# Patient Record
Sex: FEMALE | Race: WHITE | Employment: FULL TIME | URBAN - METROPOLITAN AREA
[De-identification: names, ages, dates, MRNs, and addresses within clinical notes are randomized per-mention and may not be internally consistent; named-entity substitution may affect disease eponyms.]

---

## 2018-05-07 ENCOUNTER — TELEPHONE (OUTPATIENT)
Dept: BREAST CENTER | Age: 38
End: 2018-05-07

## 2018-05-07 DIAGNOSIS — N63.10 MASS OF BREAST, RIGHT: Primary | ICD-10-CM

## 2018-05-09 ENCOUNTER — HOSPITAL ENCOUNTER (OUTPATIENT)
Dept: GENERAL RADIOLOGY | Age: 38
Discharge: HOME | End: 2018-05-11
Payer: COMMERCIAL

## 2018-05-09 DIAGNOSIS — N63.10 MASS OF RIGHT BREAST: ICD-10-CM

## 2018-05-09 DIAGNOSIS — N63.10 MASS OF BREAST, RIGHT: ICD-10-CM

## 2018-05-09 PROCEDURE — 88342 IMHCHEM/IMCYTCHM 1ST ANTB: CPT

## 2018-05-09 PROCEDURE — A4648 IMPLANTABLE TISSUE MARKER: HCPCS

## 2018-05-09 PROCEDURE — 2500000003 HC RX 250 WO HCPCS

## 2018-05-09 PROCEDURE — 77065 DX MAMMO INCL CAD UNI: CPT

## 2018-05-09 PROCEDURE — 88305 TISSUE EXAM BY PATHOLOGIST: CPT

## 2018-05-09 PROCEDURE — 88360 TUMOR IMMUNOHISTOCHEM/MANUAL: CPT

## 2018-05-09 PROCEDURE — 88341 IMHCHEM/IMCYTCHM EA ADD ANTB: CPT

## 2018-05-10 ENCOUNTER — TELEPHONE (OUTPATIENT)
Dept: ONCOLOGY | Age: 38
End: 2018-05-10

## 2018-05-15 ENCOUNTER — TELEPHONE (OUTPATIENT)
Dept: GENERAL RADIOLOGY | Age: 38
End: 2018-05-15

## 2018-05-16 DIAGNOSIS — Z85.3 HISTORY OF RIGHT BREAST CANCER: Primary | ICD-10-CM

## 2018-05-17 ENCOUNTER — OFFICE VISIT (OUTPATIENT)
Dept: BREAST CENTER | Age: 38
End: 2018-05-17
Payer: COMMERCIAL

## 2018-05-17 ENCOUNTER — TELEPHONE (OUTPATIENT)
Dept: SURGERY | Age: 38
End: 2018-05-17

## 2018-05-17 ENCOUNTER — HOSPITAL ENCOUNTER (OUTPATIENT)
Dept: GENERAL RADIOLOGY | Age: 38
Discharge: HOME | End: 2018-05-19
Payer: COMMERCIAL

## 2018-05-17 VITALS
SYSTOLIC BLOOD PRESSURE: 120 MMHG | RESPIRATION RATE: 16 BRPM | DIASTOLIC BLOOD PRESSURE: 68 MMHG | TEMPERATURE: 98.1 F | HEIGHT: 62 IN | HEART RATE: 76 BPM | WEIGHT: 157 LBS | OXYGEN SATURATION: 98 % | BODY MASS INDEX: 28.89 KG/M2

## 2018-05-17 DIAGNOSIS — C50.419 MALIGNANT NEOPLASM OF UPPER-OUTER QUADRANT OF BREAST IN FEMALE, ESTROGEN RECEPTOR POSITIVE, UNSPECIFIED LATERALITY (HCC): Primary | ICD-10-CM

## 2018-05-17 DIAGNOSIS — C50.411 MALIGNANT NEOPLASM OF UPPER-OUTER QUADRANT OF RIGHT BREAST IN FEMALE, ESTROGEN RECEPTOR POSITIVE (HCC): ICD-10-CM

## 2018-05-17 DIAGNOSIS — Z17.0 MALIGNANT NEOPLASM OF UPPER-OUTER QUADRANT OF RIGHT BREAST IN FEMALE, ESTROGEN RECEPTOR POSITIVE (HCC): Primary | ICD-10-CM

## 2018-05-17 DIAGNOSIS — Z17.0 MALIGNANT NEOPLASM OF UPPER-OUTER QUADRANT OF BREAST IN FEMALE, ESTROGEN RECEPTOR POSITIVE, UNSPECIFIED LATERALITY (HCC): Primary | ICD-10-CM

## 2018-05-17 DIAGNOSIS — C50.411 MALIGNANT NEOPLASM OF UPPER-OUTER QUADRANT OF RIGHT BREAST IN FEMALE, ESTROGEN RECEPTOR POSITIVE (HCC): Primary | ICD-10-CM

## 2018-05-17 DIAGNOSIS — Z17.0 MALIGNANT NEOPLASM OF UPPER-OUTER QUADRANT OF RIGHT BREAST IN FEMALE, ESTROGEN RECEPTOR POSITIVE (HCC): ICD-10-CM

## 2018-05-17 DIAGNOSIS — Z85.3 HISTORY OF RIGHT BREAST CANCER: ICD-10-CM

## 2018-05-17 LAB
ALBUMIN SERPL-MCNC: 4.2 G/DL (ref 3.5–5.2)
ALP BLD-CCNC: 146 U/L (ref 35–104)
ALT SERPL-CCNC: 7 U/L (ref 0–32)
ANION GAP SERPL CALCULATED.3IONS-SCNC: 11 MMOL/L (ref 7–16)
AST SERPL-CCNC: 12 U/L (ref 0–31)
BILIRUB SERPL-MCNC: 0.5 MG/DL (ref 0–1.2)
BUN BLDV-MCNC: 5 MG/DL (ref 6–20)
C-REACTIVE PROTEIN: 0.3 MG/DL (ref 0–0.4)
CALCIUM SERPL-MCNC: 8.8 MG/DL (ref 8.6–10.2)
CHLORIDE BLD-SCNC: 104 MMOL/L (ref 98–107)
CO2: 24 MMOL/L (ref 22–29)
CREAT SERPL-MCNC: 0.7 MG/DL (ref 0.5–1)
GFR AFRICAN AMERICAN: >60
GFR NON-AFRICAN AMERICAN: >60 ML/MIN/1.73
GLUCOSE BLD-MCNC: 87 MG/DL (ref 74–109)
HCT VFR BLD CALC: 39.5 % (ref 34–48)
HEMOGLOBIN: 13 G/DL (ref 11.5–15.5)
MCH RBC QN AUTO: 30.2 PG (ref 26–35)
MCHC RBC AUTO-ENTMCNC: 32.9 % (ref 32–34.5)
MCV RBC AUTO: 91.9 FL (ref 80–99.9)
PDW BLD-RTO: 13.2 FL (ref 11.5–15)
PLATELET # BLD: 438 E9/L (ref 130–450)
PMV BLD AUTO: 9.1 FL (ref 7–12)
POTASSIUM SERPL-SCNC: 3.6 MMOL/L (ref 3.5–5)
RBC # BLD: 4.3 E12/L (ref 3.5–5.5)
SEDIMENTATION RATE, ERYTHROCYTE: 19 MM/HR (ref 0–20)
SODIUM BLD-SCNC: 139 MMOL/L (ref 132–146)
TOTAL PROTEIN: 7 G/DL (ref 6.4–8.3)
WBC # BLD: 10 E9/L (ref 4.5–11.5)

## 2018-05-17 PROCEDURE — 99203 OFFICE O/P NEW LOW 30 MIN: CPT | Performed by: SURGERY

## 2018-05-17 PROCEDURE — 36415 COLL VENOUS BLD VENIPUNCTURE: CPT

## 2018-05-17 PROCEDURE — 85651 RBC SED RATE NONAUTOMATED: CPT

## 2018-05-17 PROCEDURE — 85027 COMPLETE CBC AUTOMATED: CPT

## 2018-05-17 PROCEDURE — 86140 C-REACTIVE PROTEIN: CPT

## 2018-05-17 PROCEDURE — 76882 US LMTD JT/FCL EVL NVASC XTR: CPT

## 2018-05-17 PROCEDURE — 99205 OFFICE O/P NEW HI 60 MIN: CPT | Performed by: SURGERY

## 2018-05-17 PROCEDURE — 71046 X-RAY EXAM CHEST 2 VIEWS: CPT

## 2018-05-17 PROCEDURE — 80053 COMPREHEN METABOLIC PANEL: CPT

## 2018-05-17 ASSESSMENT — ENCOUNTER SYMPTOMS
COUGH: 1
GASTROINTESTINAL NEGATIVE: 1
WHEEZING: 1
EYES NEGATIVE: 1

## 2018-05-21 ENCOUNTER — HOSPITAL ENCOUNTER (OUTPATIENT)
Dept: NUCLEAR MEDICINE | Age: 38
Discharge: HOME | End: 2018-05-23
Payer: COMMERCIAL

## 2018-05-21 DIAGNOSIS — Z17.0 MALIGNANT NEOPLASM OF UPPER-OUTER QUADRANT OF BREAST IN FEMALE, ESTROGEN RECEPTOR POSITIVE, UNSPECIFIED LATERALITY (HCC): ICD-10-CM

## 2018-05-21 DIAGNOSIS — C50.419 MALIGNANT NEOPLASM OF UPPER-OUTER QUADRANT OF BREAST IN FEMALE, ESTROGEN RECEPTOR POSITIVE, UNSPECIFIED LATERALITY (HCC): ICD-10-CM

## 2018-05-21 PROCEDURE — 78306 BONE IMAGING WHOLE BODY: CPT

## 2018-05-21 PROCEDURE — A9503 TC99M MEDRONATE: HCPCS | Performed by: RADIOLOGY

## 2018-05-21 PROCEDURE — 3430000000 HC RX DIAGNOSTIC RADIOPHARMACEUTICAL: Performed by: RADIOLOGY

## 2018-05-21 RX ORDER — TC 99M MEDRONATE 20 MG/10ML
25 INJECTION, POWDER, LYOPHILIZED, FOR SOLUTION INTRAVENOUS
Status: COMPLETED | OUTPATIENT
Start: 2018-05-21 | End: 2018-05-21

## 2018-05-21 RX ADMIN — Medication 25 MILLICURIE: at 09:52

## 2018-05-22 ENCOUNTER — TELEPHONE (OUTPATIENT)
Dept: GENERAL RADIOLOGY | Age: 38
End: 2018-05-22

## 2018-05-23 ENCOUNTER — TELEPHONE (OUTPATIENT)
Dept: GENERAL RADIOLOGY | Age: 38
End: 2018-05-23

## 2018-05-23 DIAGNOSIS — C50.411 MALIGNANT NEOPLASM OF UPPER-OUTER QUADRANT OF RIGHT BREAST IN FEMALE, ESTROGEN RECEPTOR POSITIVE (HCC): Primary | ICD-10-CM

## 2018-05-23 DIAGNOSIS — Z17.0 MALIGNANT NEOPLASM OF UPPER-OUTER QUADRANT OF RIGHT BREAST IN FEMALE, ESTROGEN RECEPTOR POSITIVE (HCC): Primary | ICD-10-CM

## 2018-05-30 ENCOUNTER — HOSPITAL ENCOUNTER (OUTPATIENT)
Dept: MRI IMAGING | Age: 38
Discharge: HOME | End: 2018-06-01
Payer: COMMERCIAL

## 2018-05-30 DIAGNOSIS — Z17.0 MALIGNANT NEOPLASM OF UPPER-OUTER QUADRANT OF RIGHT BREAST IN FEMALE, ESTROGEN RECEPTOR POSITIVE (HCC): ICD-10-CM

## 2018-05-30 DIAGNOSIS — C50.411 MALIGNANT NEOPLASM OF UPPER-OUTER QUADRANT OF RIGHT BREAST IN FEMALE, ESTROGEN RECEPTOR POSITIVE (HCC): ICD-10-CM

## 2018-05-30 PROCEDURE — A9577 INJ MULTIHANCE: HCPCS | Performed by: RADIOLOGY

## 2018-05-30 PROCEDURE — 6360000004 HC RX CONTRAST MEDICATION: Performed by: RADIOLOGY

## 2018-05-30 PROCEDURE — C8908 MRI W/O FOL W/CONT, BREAST,: HCPCS

## 2018-05-30 RX ADMIN — GADOBENATE DIMEGLUMINE 20 ML: 529 INJECTION, SOLUTION INTRAVENOUS at 09:07

## 2018-05-31 ENCOUNTER — TELEPHONE (OUTPATIENT)
Dept: SURGERY | Age: 38
End: 2018-05-31

## 2018-06-01 ENCOUNTER — TELEPHONE (OUTPATIENT)
Dept: BREAST CENTER | Age: 38
End: 2018-06-01

## 2018-06-01 ENCOUNTER — OFFICE VISIT (OUTPATIENT)
Dept: SURGERY | Age: 38
End: 2018-06-01
Payer: COMMERCIAL

## 2018-06-01 VITALS
WEIGHT: 155 LBS | HEIGHT: 62 IN | BODY MASS INDEX: 28.52 KG/M2 | SYSTOLIC BLOOD PRESSURE: 114 MMHG | OXYGEN SATURATION: 99 % | HEART RATE: 74 BPM | DIASTOLIC BLOOD PRESSURE: 77 MMHG

## 2018-06-01 DIAGNOSIS — C50.411 MALIGNANT NEOPLASM OF UPPER-OUTER QUADRANT OF RIGHT BREAST IN FEMALE, ESTROGEN RECEPTOR POSITIVE (HCC): Primary | ICD-10-CM

## 2018-06-01 DIAGNOSIS — Z17.0 MALIGNANT NEOPLASM OF UPPER-OUTER QUADRANT OF RIGHT BREAST IN FEMALE, ESTROGEN RECEPTOR POSITIVE (HCC): Primary | ICD-10-CM

## 2018-06-01 DIAGNOSIS — F17.200 SMOKING ADDICTION: ICD-10-CM

## 2018-06-01 PROCEDURE — 99204 OFFICE O/P NEW MOD 45 MIN: CPT | Performed by: PLASTIC SURGERY

## 2018-06-04 ENCOUNTER — HOSPITAL ENCOUNTER (OUTPATIENT)
Dept: RADIATION ONCOLOGY | Age: 38
Discharge: HOME | End: 2018-06-04
Payer: COMMERCIAL

## 2018-06-04 VITALS
TEMPERATURE: 98 F | OXYGEN SATURATION: 95 % | SYSTOLIC BLOOD PRESSURE: 108 MMHG | BODY MASS INDEX: 28.7 KG/M2 | DIASTOLIC BLOOD PRESSURE: 60 MMHG | HEART RATE: 92 BPM | WEIGHT: 156.9 LBS

## 2018-06-04 DIAGNOSIS — C80.1 CANCER (HCC): Primary | ICD-10-CM

## 2018-06-04 PROCEDURE — 99205 OFFICE O/P NEW HI 60 MIN: CPT | Performed by: RADIOLOGY

## 2018-06-04 PROCEDURE — 99205 OFFICE O/P NEW HI 60 MIN: CPT

## 2018-06-04 RX ORDER — VARENICLINE TARTRATE 1 MG/1
1 TABLET, FILM COATED ORAL 2 TIMES DAILY
COMMUNITY
End: 2018-07-11

## 2018-06-15 ENCOUNTER — PREP FOR PROCEDURE (OUTPATIENT)
Dept: SURGERY | Age: 38
End: 2018-06-15

## 2018-06-15 RX ORDER — SODIUM CHLORIDE 9 MG/ML
INJECTION, SOLUTION INTRAVENOUS CONTINUOUS
Status: CANCELLED | OUTPATIENT
Start: 2018-06-15

## 2018-06-15 RX ORDER — SODIUM CHLORIDE 0.9 % (FLUSH) 0.9 %
10 SYRINGE (ML) INJECTION PRN
Status: CANCELLED | OUTPATIENT
Start: 2018-06-15

## 2018-06-15 RX ORDER — SODIUM CHLORIDE 0.9 % (FLUSH) 0.9 %
10 SYRINGE (ML) INJECTION EVERY 12 HOURS SCHEDULED
Status: CANCELLED | OUTPATIENT
Start: 2018-06-15

## 2018-06-19 ENCOUNTER — TELEPHONE (OUTPATIENT)
Dept: BREAST CENTER | Age: 38
End: 2018-06-19

## 2018-06-26 ENCOUNTER — TELEPHONE (OUTPATIENT)
Dept: BREAST CENTER | Age: 38
End: 2018-06-26

## 2018-06-28 ENCOUNTER — TELEPHONE (OUTPATIENT)
Dept: SURGERY | Age: 38
End: 2018-06-28

## 2018-07-03 ENCOUNTER — HOSPITAL ENCOUNTER (OUTPATIENT)
Dept: GENERAL RADIOLOGY | Age: 38
Discharge: HOME | End: 2018-07-05
Attending: SURGERY
Payer: COMMERCIAL

## 2018-07-03 ENCOUNTER — ANESTHESIA (OUTPATIENT)
Dept: OPERATING ROOM | Age: 38
End: 2018-07-03
Payer: COMMERCIAL

## 2018-07-03 ENCOUNTER — HOSPITAL ENCOUNTER (OUTPATIENT)
Dept: NUCLEAR MEDICINE | Age: 38
Discharge: HOME | End: 2018-07-05
Payer: COMMERCIAL

## 2018-07-03 ENCOUNTER — ANESTHESIA EVENT (OUTPATIENT)
Dept: OPERATING ROOM | Age: 38
End: 2018-07-03
Payer: COMMERCIAL

## 2018-07-03 ENCOUNTER — HOSPITAL ENCOUNTER (OUTPATIENT)
Age: 38
Discharge: HOME | End: 2018-07-03
Attending: SURGERY | Admitting: SURGERY
Payer: COMMERCIAL

## 2018-07-03 VITALS
OXYGEN SATURATION: 98 % | SYSTOLIC BLOOD PRESSURE: 115 MMHG | DIASTOLIC BLOOD PRESSURE: 66 MMHG | TEMPERATURE: 96.4 F | RESPIRATION RATE: 16 BRPM

## 2018-07-03 VITALS
RESPIRATION RATE: 16 BRPM | WEIGHT: 156 LBS | TEMPERATURE: 98.2 F | HEIGHT: 62 IN | BODY MASS INDEX: 28.71 KG/M2 | SYSTOLIC BLOOD PRESSURE: 119 MMHG | OXYGEN SATURATION: 95 % | HEART RATE: 77 BPM | DIASTOLIC BLOOD PRESSURE: 64 MMHG

## 2018-07-03 DIAGNOSIS — G89.18 POST-OP PAIN: Primary | ICD-10-CM

## 2018-07-03 DIAGNOSIS — Z85.3 PERSONAL HISTORY OF BREAST CANCER: ICD-10-CM

## 2018-07-03 LAB — PREGNANCY TEST URINE, POC: NEGATIVE

## 2018-07-03 PROCEDURE — 6360000002 HC RX W HCPCS: Performed by: NURSE ANESTHETIST, CERTIFIED REGISTERED

## 2018-07-03 PROCEDURE — 3600000003 HC SURGERY LEVEL 3 BASE: Performed by: SURGERY

## 2018-07-03 PROCEDURE — 3700000000 HC ANESTHESIA ATTENDED CARE: Performed by: SURGERY

## 2018-07-03 PROCEDURE — A9541 TC99M SULFUR COLLOID: HCPCS | Performed by: RADIOLOGY

## 2018-07-03 PROCEDURE — 38792 RA TRACER ID OF SENTINL NODE: CPT

## 2018-07-03 PROCEDURE — 3430000000 HC RX DIAGNOSTIC RADIOPHARMACEUTICAL: Performed by: RADIOLOGY

## 2018-07-03 PROCEDURE — 2709999900 HC NON-CHARGEABLE SUPPLY: Performed by: SURGERY

## 2018-07-03 PROCEDURE — 2500000003 HC RX 250 WO HCPCS: Performed by: NURSE ANESTHETIST, CERTIFIED REGISTERED

## 2018-07-03 PROCEDURE — 7100000010 HC PHASE II RECOVERY - FIRST 15 MIN: Performed by: SURGERY

## 2018-07-03 PROCEDURE — 6360000002 HC RX W HCPCS: Performed by: SURGERY

## 2018-07-03 PROCEDURE — 19318 BREAST REDUCTION: CPT | Performed by: PLASTIC SURGERY

## 2018-07-03 PROCEDURE — 19281 PERQ DEVICE BREAST 1ST IMAG: CPT

## 2018-07-03 PROCEDURE — 2720000010 HC SURG SUPPLY STERILE: Performed by: SURGERY

## 2018-07-03 PROCEDURE — 7100000011 HC PHASE II RECOVERY - ADDTL 15 MIN: Performed by: SURGERY

## 2018-07-03 PROCEDURE — 7100000001 HC PACU RECOVERY - ADDTL 15 MIN: Performed by: SURGERY

## 2018-07-03 PROCEDURE — 2500000003 HC RX 250 WO HCPCS: Performed by: SURGERY

## 2018-07-03 PROCEDURE — 6360000002 HC RX W HCPCS

## 2018-07-03 PROCEDURE — 3600000013 HC SURGERY LEVEL 3 ADDTL 15MIN: Performed by: SURGERY

## 2018-07-03 PROCEDURE — 2580000003 HC RX 258: Performed by: SURGERY

## 2018-07-03 PROCEDURE — 7100000000 HC PACU RECOVERY - FIRST 15 MIN: Performed by: SURGERY

## 2018-07-03 PROCEDURE — 2500000003 HC RX 250 WO HCPCS

## 2018-07-03 PROCEDURE — A4648 IMPLANTABLE TISSUE MARKER: HCPCS | Performed by: SURGERY

## 2018-07-03 PROCEDURE — 76098 X-RAY EXAM SURGICAL SPECIMEN: CPT

## 2018-07-03 PROCEDURE — 3700000001 HC ADD 15 MINUTES (ANESTHESIA): Performed by: SURGERY

## 2018-07-03 PROCEDURE — 6360000002 HC RX W HCPCS: Performed by: ANESTHESIOLOGY

## 2018-07-03 PROCEDURE — 19318 BREAST REDUCTION: CPT | Performed by: PHYSICIAN ASSISTANT

## 2018-07-03 RX ORDER — LIDOCAINE HYDROCHLORIDE 20 MG/ML
INJECTION, SOLUTION INFILTRATION; PERINEURAL PRN
Status: DISCONTINUED | OUTPATIENT
Start: 2018-07-03 | End: 2018-07-03 | Stop reason: SDUPTHER

## 2018-07-03 RX ORDER — HYDROCODONE BITARTRATE AND ACETAMINOPHEN 5; 325 MG/1; MG/1
2 TABLET ORAL PRN
Status: DISCONTINUED | OUTPATIENT
Start: 2018-07-03 | End: 2018-07-03 | Stop reason: HOSPADM

## 2018-07-03 RX ORDER — HYDROCODONE BITARTRATE AND ACETAMINOPHEN 5; 325 MG/1; MG/1
1 TABLET ORAL PRN
Status: DISCONTINUED | OUTPATIENT
Start: 2018-07-03 | End: 2018-07-03 | Stop reason: HOSPADM

## 2018-07-03 RX ORDER — KETOROLAC TROMETHAMINE 30 MG/ML
INJECTION, SOLUTION INTRAMUSCULAR; INTRAVENOUS
Status: COMPLETED
Start: 2018-07-03 | End: 2018-07-03

## 2018-07-03 RX ORDER — SODIUM CHLORIDE 9 MG/ML
INJECTION, SOLUTION INTRAVENOUS CONTINUOUS
Status: DISCONTINUED | OUTPATIENT
Start: 2018-07-03 | End: 2018-07-03 | Stop reason: HOSPADM

## 2018-07-03 RX ORDER — ONDANSETRON 2 MG/ML
INJECTION INTRAMUSCULAR; INTRAVENOUS PRN
Status: DISCONTINUED | OUTPATIENT
Start: 2018-07-03 | End: 2018-07-03 | Stop reason: SDUPTHER

## 2018-07-03 RX ORDER — MIDAZOLAM HYDROCHLORIDE 1 MG/ML
INJECTION INTRAMUSCULAR; INTRAVENOUS PRN
Status: DISCONTINUED | OUTPATIENT
Start: 2018-07-03 | End: 2018-07-03 | Stop reason: SDUPTHER

## 2018-07-03 RX ORDER — SODIUM CHLORIDE 0.9 % (FLUSH) 0.9 %
10 SYRINGE (ML) INJECTION EVERY 12 HOURS SCHEDULED
Status: DISCONTINUED | OUTPATIENT
Start: 2018-07-03 | End: 2018-07-03 | Stop reason: HOSPADM

## 2018-07-03 RX ORDER — MEPERIDINE HYDROCHLORIDE 50 MG/ML
12.5 INJECTION INTRAMUSCULAR; INTRAVENOUS; SUBCUTANEOUS EVERY 5 MIN PRN
Status: DISCONTINUED | OUTPATIENT
Start: 2018-07-03 | End: 2018-07-03 | Stop reason: HOSPADM

## 2018-07-03 RX ORDER — BUPIVACAINE HYDROCHLORIDE 2.5 MG/ML
INJECTION, SOLUTION EPIDURAL; INFILTRATION; INTRACAUDAL PRN
Status: DISCONTINUED | OUTPATIENT
Start: 2018-07-03 | End: 2018-07-03 | Stop reason: HOSPADM

## 2018-07-03 RX ORDER — SODIUM CHLORIDE 0.9 % (FLUSH) 0.9 %
10 SYRINGE (ML) INJECTION PRN
Status: DISCONTINUED | OUTPATIENT
Start: 2018-07-03 | End: 2018-07-03 | Stop reason: SDUPTHER

## 2018-07-03 RX ORDER — PROMETHAZINE HYDROCHLORIDE 25 MG/ML
6.25 INJECTION, SOLUTION INTRAMUSCULAR; INTRAVENOUS EVERY 10 MIN PRN
Status: DISCONTINUED | OUTPATIENT
Start: 2018-07-03 | End: 2018-07-03 | Stop reason: HOSPADM

## 2018-07-03 RX ORDER — MORPHINE SULFATE 10 MG/ML
INJECTION, SOLUTION INTRAMUSCULAR; INTRAVENOUS PRN
Status: DISCONTINUED | OUTPATIENT
Start: 2018-07-03 | End: 2018-07-03 | Stop reason: SDUPTHER

## 2018-07-03 RX ORDER — MORPHINE SULFATE 2 MG/ML
2 INJECTION, SOLUTION INTRAMUSCULAR; INTRAVENOUS EVERY 5 MIN PRN
Status: DISCONTINUED | OUTPATIENT
Start: 2018-07-03 | End: 2018-07-03 | Stop reason: HOSPADM

## 2018-07-03 RX ORDER — METHYLENE BLUE 10 MG/ML
INJECTION INTRAVENOUS PRN
Status: DISCONTINUED | OUTPATIENT
Start: 2018-07-03 | End: 2018-07-03 | Stop reason: HOSPADM

## 2018-07-03 RX ORDER — NEOSTIGMINE METHYLSULFATE 1 MG/ML
INJECTION, SOLUTION INTRAVENOUS PRN
Status: DISCONTINUED | OUTPATIENT
Start: 2018-07-03 | End: 2018-07-03 | Stop reason: SDUPTHER

## 2018-07-03 RX ORDER — PROPOFOL 10 MG/ML
INJECTION, EMULSION INTRAVENOUS PRN
Status: DISCONTINUED | OUTPATIENT
Start: 2018-07-03 | End: 2018-07-03 | Stop reason: SDUPTHER

## 2018-07-03 RX ORDER — GLYCOPYRROLATE 1 MG/5 ML
SYRINGE (ML) INTRAVENOUS PRN
Status: DISCONTINUED | OUTPATIENT
Start: 2018-07-03 | End: 2018-07-03 | Stop reason: SDUPTHER

## 2018-07-03 RX ORDER — ROCURONIUM BROMIDE 10 MG/ML
INJECTION, SOLUTION INTRAVENOUS PRN
Status: DISCONTINUED | OUTPATIENT
Start: 2018-07-03 | End: 2018-07-03 | Stop reason: SDUPTHER

## 2018-07-03 RX ORDER — OXYCODONE HYDROCHLORIDE AND ACETAMINOPHEN 5; 325 MG/1; MG/1
1 TABLET ORAL EVERY 6 HOURS PRN
Qty: 25 TABLET | Refills: 0 | Status: SHIPPED | OUTPATIENT
Start: 2018-07-03 | End: 2018-07-10

## 2018-07-03 RX ORDER — DEXAMETHASONE SODIUM PHOSPHATE 10 MG/ML
INJECTION, SOLUTION INTRAMUSCULAR; INTRAVENOUS PRN
Status: DISCONTINUED | OUTPATIENT
Start: 2018-07-03 | End: 2018-07-03 | Stop reason: SDUPTHER

## 2018-07-03 RX ORDER — SODIUM CHLORIDE 0.9 % (FLUSH) 0.9 %
10 SYRINGE (ML) INJECTION PRN
Status: DISCONTINUED | OUTPATIENT
Start: 2018-07-03 | End: 2018-07-03 | Stop reason: HOSPADM

## 2018-07-03 RX ORDER — SODIUM CHLORIDE 0.9 % (FLUSH) 0.9 %
10 SYRINGE (ML) INJECTION EVERY 12 HOURS SCHEDULED
Status: DISCONTINUED | OUTPATIENT
Start: 2018-07-03 | End: 2018-07-03 | Stop reason: SDUPTHER

## 2018-07-03 RX ORDER — KETOROLAC TROMETHAMINE 30 MG/ML
30 INJECTION, SOLUTION INTRAMUSCULAR; INTRAVENOUS ONCE
Status: COMPLETED | OUTPATIENT
Start: 2018-07-03 | End: 2018-07-03

## 2018-07-03 RX ORDER — CEPHALEXIN 500 MG/1
500 CAPSULE ORAL 4 TIMES DAILY
Qty: 20 CAPSULE | Refills: 0 | Status: SHIPPED | OUTPATIENT
Start: 2018-07-03 | End: 2018-07-08

## 2018-07-03 RX ORDER — MORPHINE SULFATE 2 MG/ML
1 INJECTION, SOLUTION INTRAMUSCULAR; INTRAVENOUS EVERY 5 MIN PRN
Status: DISCONTINUED | OUTPATIENT
Start: 2018-07-03 | End: 2018-07-03 | Stop reason: HOSPADM

## 2018-07-03 RX ORDER — FENTANYL CITRATE 50 UG/ML
INJECTION, SOLUTION INTRAMUSCULAR; INTRAVENOUS PRN
Status: DISCONTINUED | OUTPATIENT
Start: 2018-07-03 | End: 2018-07-03 | Stop reason: SDUPTHER

## 2018-07-03 RX ORDER — MIDAZOLAM HYDROCHLORIDE 5 MG/ML
INJECTION INTRAMUSCULAR; INTRAVENOUS PRN
Status: DISCONTINUED | OUTPATIENT
Start: 2018-07-03 | End: 2018-07-03

## 2018-07-03 RX ADMIN — CEFAZOLIN SODIUM 2 G: 2 SOLUTION INTRAVENOUS at 10:04

## 2018-07-03 RX ADMIN — Medication 0.6 MG: at 12:29

## 2018-07-03 RX ADMIN — FENTANYL CITRATE 25 MCG: 50 INJECTION, SOLUTION INTRAMUSCULAR; INTRAVENOUS at 12:45

## 2018-07-03 RX ADMIN — ONDANSETRON 4 MG: 2 INJECTION INTRAMUSCULAR; INTRAVENOUS at 12:19

## 2018-07-03 RX ADMIN — LIDOCAINE HYDROCHLORIDE 80 MG: 20 INJECTION, SOLUTION INFILTRATION; PERINEURAL at 10:01

## 2018-07-03 RX ADMIN — Medication 500 MICRO CURIE: at 08:43

## 2018-07-03 RX ADMIN — SODIUM CHLORIDE: 9 INJECTION, SOLUTION INTRAVENOUS at 10:52

## 2018-07-03 RX ADMIN — Medication 3 MG: at 12:29

## 2018-07-03 RX ADMIN — PROPOFOL 180 MG: 10 INJECTION, EMULSION INTRAVENOUS at 10:01

## 2018-07-03 RX ADMIN — FENTANYL CITRATE 50 MCG: 50 INJECTION, SOLUTION INTRAMUSCULAR; INTRAVENOUS at 12:05

## 2018-07-03 RX ADMIN — SODIUM CHLORIDE: 9 INJECTION, SOLUTION INTRAVENOUS at 09:56

## 2018-07-03 RX ADMIN — MORPHINE SULFATE 2 MG: 10 INJECTION INTRAVENOUS at 12:44

## 2018-07-03 RX ADMIN — MORPHINE SULFATE 2.5 MG: 10 INJECTION INTRAVENOUS at 12:54

## 2018-07-03 RX ADMIN — FENTANYL CITRATE 50 MCG: 50 INJECTION, SOLUTION INTRAMUSCULAR; INTRAVENOUS at 10:52

## 2018-07-03 RX ADMIN — HYDROMORPHONE HYDROCHLORIDE 0.5 MG: 1 INJECTION, SOLUTION INTRAMUSCULAR; INTRAVENOUS; SUBCUTANEOUS at 13:35

## 2018-07-03 RX ADMIN — MORPHINE SULFATE 3 MG: 10 INJECTION INTRAVENOUS at 12:50

## 2018-07-03 RX ADMIN — MIDAZOLAM HYDROCHLORIDE 2 MG: 1 INJECTION, SOLUTION INTRAMUSCULAR; INTRAVENOUS at 09:56

## 2018-07-03 RX ADMIN — KETOROLAC TROMETHAMINE 30 MG: 30 INJECTION, SOLUTION INTRAMUSCULAR; INTRAVENOUS at 13:24

## 2018-07-03 RX ADMIN — ENOXAPARIN SODIUM 40 MG: 40 INJECTION SUBCUTANEOUS at 09:06

## 2018-07-03 RX ADMIN — ROCURONIUM BROMIDE 40 MG: 10 INJECTION, SOLUTION INTRAVENOUS at 10:01

## 2018-07-03 RX ADMIN — FENTANYL CITRATE 100 MCG: 50 INJECTION, SOLUTION INTRAMUSCULAR; INTRAVENOUS at 10:01

## 2018-07-03 RX ADMIN — DEXAMETHASONE SODIUM PHOSPHATE 10 MG: 10 INJECTION, SOLUTION INTRAMUSCULAR; INTRAVENOUS at 10:01

## 2018-07-03 RX ADMIN — MORPHINE SULFATE 2.5 MG: 10 INJECTION INTRAVENOUS at 12:55

## 2018-07-03 RX ADMIN — FENTANYL CITRATE 25 MCG: 50 INJECTION, SOLUTION INTRAMUSCULAR; INTRAVENOUS at 12:37

## 2018-07-03 ASSESSMENT — PULMONARY FUNCTION TESTS
PIF_VALUE: 22
PIF_VALUE: 27
PIF_VALUE: 23
PIF_VALUE: 5
PIF_VALUE: 20
PIF_VALUE: 23
PIF_VALUE: 22
PIF_VALUE: 13
PIF_VALUE: 21
PIF_VALUE: 21
PIF_VALUE: 23
PIF_VALUE: 0
PIF_VALUE: 21
PIF_VALUE: 23
PIF_VALUE: 22
PIF_VALUE: 0
PIF_VALUE: 21
PIF_VALUE: 1
PIF_VALUE: 23
PIF_VALUE: 23
PIF_VALUE: 2
PIF_VALUE: 22
PIF_VALUE: 22
PIF_VALUE: 0
PIF_VALUE: 23
PIF_VALUE: 21
PIF_VALUE: 13
PIF_VALUE: 12
PIF_VALUE: 0
PIF_VALUE: 12
PIF_VALUE: 8
PIF_VALUE: 21
PIF_VALUE: 5
PIF_VALUE: 2
PIF_VALUE: 22
PIF_VALUE: 23
PIF_VALUE: 22
PIF_VALUE: 22
PIF_VALUE: 20
PIF_VALUE: 25
PIF_VALUE: 21
PIF_VALUE: 0
PIF_VALUE: 22
PIF_VALUE: 23
PIF_VALUE: 20
PIF_VALUE: 22
PIF_VALUE: 3
PIF_VALUE: 22
PIF_VALUE: 21
PIF_VALUE: 22
PIF_VALUE: 17
PIF_VALUE: 22
PIF_VALUE: 21
PIF_VALUE: 15
PIF_VALUE: 22
PIF_VALUE: 22
PIF_VALUE: 24
PIF_VALUE: 0
PIF_VALUE: 22
PIF_VALUE: 12
PIF_VALUE: 16
PIF_VALUE: 21
PIF_VALUE: 23
PIF_VALUE: 22
PIF_VALUE: 21
PIF_VALUE: 22
PIF_VALUE: 24
PIF_VALUE: 21
PIF_VALUE: 0
PIF_VALUE: 21
PIF_VALUE: 22
PIF_VALUE: 21
PIF_VALUE: 23
PIF_VALUE: 24
PIF_VALUE: 24
PIF_VALUE: 16
PIF_VALUE: 22
PIF_VALUE: 16
PIF_VALUE: 21
PIF_VALUE: 3
PIF_VALUE: 23
PIF_VALUE: 22
PIF_VALUE: 23
PIF_VALUE: 22
PIF_VALUE: 23
PIF_VALUE: 16
PIF_VALUE: 21
PIF_VALUE: 9
PIF_VALUE: 21
PIF_VALUE: 22
PIF_VALUE: 24
PIF_VALUE: 18
PIF_VALUE: 21
PIF_VALUE: 22
PIF_VALUE: 13
PIF_VALUE: 22
PIF_VALUE: 2
PIF_VALUE: 22
PIF_VALUE: 12
PIF_VALUE: 15
PIF_VALUE: 22
PIF_VALUE: 23
PIF_VALUE: 19
PIF_VALUE: 22
PIF_VALUE: 23
PIF_VALUE: 22
PIF_VALUE: 22
PIF_VALUE: 0
PIF_VALUE: 21
PIF_VALUE: 22
PIF_VALUE: 23
PIF_VALUE: 22
PIF_VALUE: 22
PIF_VALUE: 21
PIF_VALUE: 21
PIF_VALUE: 23
PIF_VALUE: 22
PIF_VALUE: 2
PIF_VALUE: 21
PIF_VALUE: 22
PIF_VALUE: 5
PIF_VALUE: 21
PIF_VALUE: 23
PIF_VALUE: 22
PIF_VALUE: 5
PIF_VALUE: 0
PIF_VALUE: 23
PIF_VALUE: 22
PIF_VALUE: 21
PIF_VALUE: 22
PIF_VALUE: 23
PIF_VALUE: 21
PIF_VALUE: 21
PIF_VALUE: 24
PIF_VALUE: 22
PIF_VALUE: 21
PIF_VALUE: 21
PIF_VALUE: 23
PIF_VALUE: 18
PIF_VALUE: 23
PIF_VALUE: 21
PIF_VALUE: 23
PIF_VALUE: 23
PIF_VALUE: 22
PIF_VALUE: 23
PIF_VALUE: 21
PIF_VALUE: 22
PIF_VALUE: 21

## 2018-07-03 ASSESSMENT — PAIN SCALES - GENERAL
PAINLEVEL_OUTOF10: 0
PAINLEVEL_OUTOF10: 7
PAINLEVEL_OUTOF10: 7
PAINLEVEL_OUTOF10: 0
PAINLEVEL_OUTOF10: 5
PAINLEVEL_OUTOF10: 0

## 2018-07-03 ASSESSMENT — PAIN - FUNCTIONAL ASSESSMENT: PAIN_FUNCTIONAL_ASSESSMENT: 0-10

## 2018-07-03 ASSESSMENT — PAIN DESCRIPTION - PAIN TYPE
TYPE: SURGICAL PAIN

## 2018-07-03 ASSESSMENT — PAIN DESCRIPTION - DESCRIPTORS
DESCRIPTORS: BURNING;DISCOMFORT
DESCRIPTORS: BURNING;DISCOMFORT

## 2018-07-03 ASSESSMENT — PAIN DESCRIPTION - ORIENTATION
ORIENTATION: RIGHT;LEFT
ORIENTATION: RIGHT;LEFT

## 2018-07-03 ASSESSMENT — PAIN DESCRIPTION - LOCATION
LOCATION: BREAST
LOCATION: BREAST

## 2018-07-03 ASSESSMENT — LIFESTYLE VARIABLES: SMOKING_STATUS: 1

## 2018-07-03 NOTE — ANESTHESIA POSTPROCEDURE EVALUATION
Department of Anesthesiology  Postprocedure Note    Patient: Amos Philip  MRN: [id number]  YOB: 1980  Date of evaluation: 7/3/2018  Time:  2:29 PM     Procedure Summary     Date:  07/03/18 Room / Location:  [facility]  / [facility]    Anesthesia Start:   Anesthesia Stop:      Procedures:       RIGHT BREAST NEEDLE LOCALIZATION LUMPECTOMY SENTINEL LYMPH NODE BIOPSY - TRIDENT AND NEOPROBE (Right )      RIGHT ONCOPLASTIC BREAST REDUCTION, MATCHING LEFT BREAST REDUCTION (Bilateral Breast) Diagnosis:  (BREAST CANCER)    Surgeon:  aMrtha Ty MD; Parisa Cee MD Responsible Provider:      Anesthesia Type:  general ASA Status:  4          Anesthesia Type: general    Angie Phase I: Angie Score: 10    Angie Phase II: Angie Score: 10    Last vitals: Reviewed and per EMR flowsheets.        Anesthesia Post Evaluation    Patient location during evaluation: PACU  Patient participation: complete - patient participated  Level of consciousness: awake  Pain score: 3  Airway patency: patent  Nausea & Vomiting: no nausea and no vomiting  Complications: no  Cardiovascular status: blood pressure returned to baseline  Respiratory status: acceptable  Hydration status: euvolemic

## 2018-07-03 NOTE — ANESTHESIA PRE PROCEDURE
Department of Anesthesiology  Preprocedure Note       Name:  Adam Lagunas   Age:  40 y.o.  :  1980                                          MRN:  [id number]         Date:  7/3/2018      Surgeon: Nya Barrientos):  MD Radha Chino MD Shary Favorite, MD    Procedure: Procedure(s):  RIGHT BREAST NEEDLE LOCALIZATION LUMPECTOMY SENTINEL LYMPH NODE BIOPSY - TRIDENT AND NEOPROBE  RIGHT ONCOPLASTIC BREAST REDUCTION, MATCHING LEFT BREAST REDUCTION    Medications prior to admission:   Prior to Admission medications    Medication Sig Start Date End Date Taking? Authorizing Provider   varenicline (CHANTIX) 1 MG tablet Take 1 mg by mouth 2 times daily    Historical Provider, MD       Current medications:    Current Facility-Administered Medications   Medication Dose Route Frequency Provider Last Rate Last Dose    0.9 % sodium chloride infusion   Intravenous Continuous Simona Zaldivar MD        ceFAZolin (ANCEF) 2 g in dextrose 3 % 50 mL IVPB (duplex)  2 g Intravenous On Call to Angi Tabor MD        enoxaparin (LOVENOX) injection 40 mg  40 mg Subcutaneous Once Siomna Zaldivar MD        sodium chloride flush 0.9 % injection 10 mL  10 mL Intravenous 2 times per day Simona Zaldivar MD        sodium chloride flush 0.9 % injection 10 mL  10 mL Intravenous PRN Simona Zaldivar MD           Allergies: Allergies   Allergen Reactions    Ultram [Tramadol Hcl]      confused       Problem List:    Patient Active Problem List   Diagnosis Code    Malignant neoplasm of upper-outer quadrant of right breast in female, estrogen receptor positive ([facility] C50.411, Z17.0       Past Medical History:  History reviewed. No pertinent past medical history.     Past Surgical History:        Procedure Laterality Date    ABDOMEN SURGERY      laparotomy    CHOLECYSTECTOMY      KNEE ARTHROSCOPY Left     LYMPH NODE BIOPSY      WISDOM TOOTH EXTRACTION         Social History:    Social History

## 2018-07-05 PROCEDURE — 88305 TISSUE EXAM BY PATHOLOGIST: CPT

## 2018-07-05 PROCEDURE — 88307 TISSUE EXAM BY PATHOLOGIST: CPT

## 2018-07-06 ENCOUNTER — OFFICE VISIT (OUTPATIENT)
Dept: SURGERY | Age: 38
End: 2018-07-06

## 2018-07-06 VITALS
BODY MASS INDEX: 30.36 KG/M2 | TEMPERATURE: 98 F | HEART RATE: 95 BPM | OXYGEN SATURATION: 98 % | SYSTOLIC BLOOD PRESSURE: 113 MMHG | DIASTOLIC BLOOD PRESSURE: 71 MMHG | WEIGHT: 165 LBS | HEIGHT: 62 IN | RESPIRATION RATE: 16 BRPM

## 2018-07-06 DIAGNOSIS — Z98.890 S/P BILATERAL BREAST REDUCTION: Primary | ICD-10-CM

## 2018-07-06 PROCEDURE — 99024 POSTOP FOLLOW-UP VISIT: CPT | Performed by: PHYSICIAN ASSISTANT

## 2018-07-09 ENCOUNTER — TELEPHONE (OUTPATIENT)
Dept: SURGERY | Age: 38
End: 2018-07-09

## 2018-07-09 ENCOUNTER — TELEPHONE (OUTPATIENT)
Dept: BREAST CENTER | Age: 38
End: 2018-07-09

## 2018-07-09 DIAGNOSIS — N64.89 SEROMA OF BREAST: Primary | ICD-10-CM

## 2018-07-09 NOTE — TELEPHONE ENCOUNTER
I called Chales Mortimer back to discuss her right axilla seroma. She states she was feeling well on Friday and I reviewed the plastics' office notes, but she did increase her activity over the weekend. She states it does not feel like her granulomatous disease, it feels more like fluid. She is uncomfortable, but no fevers or chills. I have advised calling the office tomorrow to see about drainage at [facility], if unable to schedule I can drain in the office tomorrow afternoon. I also gave her the pathology results. I discussed the positive lateral margin. I discussed that since she had a reduction I will need to talk to the plastic surgeon about location of the tissue and ultimately our tumor board about next approach. Normally I would go back and excise, but her tissue has been rearranged. I should be able to find the clips and excise from that standpoint. I will call her back later this week with a decision.

## 2018-07-09 NOTE — TELEPHONE ENCOUNTER
MA received a call from patient stating her right axilla is swollen and has a pressure feel she noticed it last night. Patient can raise arm all the way but just feels tight. MA routing message to  for advisement. Patient can be reached at [phone] for relay of advisement when get response. Patient had Right NL lumpectomy with [facility]N on 07/03/18 with Radha Flynn doing breast reduction.       Electronically signed by Marcell Omalley MA on 7/9/18 at 8:14 AM

## 2018-07-10 ENCOUNTER — HOSPITAL ENCOUNTER (OUTPATIENT)
Dept: GENERAL RADIOLOGY | Age: 38
Discharge: HOME | End: 2018-07-12
Payer: COMMERCIAL

## 2018-07-10 ENCOUNTER — TELEPHONE (OUTPATIENT)
Dept: BREAST CENTER | Age: 38
End: 2018-07-10

## 2018-07-10 DIAGNOSIS — N64.89 SEROMA OF BREAST: ICD-10-CM

## 2018-07-10 PROCEDURE — 10140 I&D HMTMA SEROMA/FLUID COLLJ: CPT

## 2018-07-10 PROCEDURE — 76882 US LMTD JT/FCL EVL NVASC XTR: CPT

## 2018-07-11 ENCOUNTER — TELEPHONE (OUTPATIENT)
Dept: BREAST CENTER | Age: 38
End: 2018-07-11

## 2018-07-11 ENCOUNTER — PREP FOR PROCEDURE (OUTPATIENT)
Dept: SURGERY | Age: 38
End: 2018-07-11

## 2018-07-11 RX ORDER — SODIUM CHLORIDE 9 MG/ML
INJECTION, SOLUTION INTRAVENOUS CONTINUOUS
Status: CANCELLED | OUTPATIENT
Start: 2018-07-11 | End: 2019-07-11

## 2018-07-11 RX ORDER — SODIUM CHLORIDE 0.9 % (FLUSH) 0.9 %
10 SYRINGE (ML) INJECTION PRN
Status: CANCELLED | OUTPATIENT
Start: 2018-07-11 | End: 2019-07-11

## 2018-07-11 RX ORDER — SODIUM CHLORIDE 0.9 % (FLUSH) 0.9 %
10 SYRINGE (ML) INJECTION EVERY 12 HOURS SCHEDULED
Status: CANCELLED | OUTPATIENT
Start: 2018-07-11 | End: 2019-07-11

## 2018-07-11 NOTE — TELEPHONE ENCOUNTER
ORALIA Dobson contacted [facility] for prior authorization. A prior authorization needed for reexcision of right breast lumpectomy lateral margin & right axilla exploration with drain placement with Dr. Opal Callahan at [address] [place] [address] in [facility]. MA Spoke with Jozef Ojeda LPN, authorization Specialist. Christy Distad number [id number]. MA scanned authorization form in media tab.     Electronically signed by Delroy Stone MA on 7/11/18 at 1:16 PM

## 2018-07-11 NOTE — TELEPHONE ENCOUNTER
Please schedule for right axilla exploration with drain placement under general anesthesia, 1 hour;  Dx:  Postop seroma

## 2018-07-11 NOTE — TELEPHONE ENCOUNTER
ORALIA Paul called and spoke to Breann Whitaker and scheduled PT for right axilla exploration with drain placement on 07/12/18 @ 8am with Dr. Virgie Rodriguez. PT denied taking any ASA products. PT verbalized she understood prep instructions, and NPO after midnight. PT verbalized that she understood appointment date/time, as well as to arrive 2 hours prior to procedure. PT advised on where to park and enter the hospital at for procedure. PT has been told to make sure they have a ride to and from procedure as they are not allowed to drive after procedure. PT given procedure letter in office or mailed to them with all instructions also on it. MA instructed PT to call the office at [phone] with any questions, comments, or concerns about the procedure.        Electronically signed by Jazlyn Vera MA on 7/11/18 at 10:44 AM

## 2018-07-11 NOTE — TELEPHONE ENCOUNTER
Patient states she had the axilla drained yesterday and it felt better afterwards but by evening time it had filled back up and is feeling worse. Patient is calling to see what she can do because the pain is bad. MA advised patient she would route  message for advisement. Patient can be reached at [phone].     Electronically signed by Pricilla Hester MA on 7/11/18 at 8:24 AM

## 2018-07-12 ENCOUNTER — HOSPITAL ENCOUNTER (OUTPATIENT)
Age: 38
Setting detail: OUTPATIENT SURGERY
Discharge: HOME | End: 2018-07-12
Attending: SURGERY | Admitting: SURGERY
Payer: COMMERCIAL

## 2018-07-12 ENCOUNTER — ANESTHESIA EVENT (OUTPATIENT)
Dept: OPERATING ROOM | Age: 38
End: 2018-07-12
Payer: COMMERCIAL

## 2018-07-12 ENCOUNTER — ANESTHESIA (OUTPATIENT)
Dept: OPERATING ROOM | Age: 38
End: 2018-07-12
Payer: COMMERCIAL

## 2018-07-12 VITALS
DIASTOLIC BLOOD PRESSURE: 66 MMHG | TEMPERATURE: 97.6 F | HEART RATE: 80 BPM | WEIGHT: 165 LBS | RESPIRATION RATE: 18 BRPM | HEIGHT: 62 IN | SYSTOLIC BLOOD PRESSURE: 121 MMHG | BODY MASS INDEX: 30.36 KG/M2 | OXYGEN SATURATION: 98 %

## 2018-07-12 VITALS — OXYGEN SATURATION: 100 % | TEMPERATURE: 94.8 F | SYSTOLIC BLOOD PRESSURE: 132 MMHG | DIASTOLIC BLOOD PRESSURE: 82 MMHG

## 2018-07-12 LAB — PREGNANCY TEST URINE, POC: NEGATIVE

## 2018-07-12 PROCEDURE — 7100000011 HC PHASE II RECOVERY - ADDTL 15 MIN: Performed by: SURGERY

## 2018-07-12 PROCEDURE — 10140 I&D HMTMA SEROMA/FLUID COLLJ: CPT | Performed by: SURGERY

## 2018-07-12 PROCEDURE — 7100000010 HC PHASE II RECOVERY - FIRST 15 MIN: Performed by: SURGERY

## 2018-07-12 PROCEDURE — 3700000001 HC ADD 15 MINUTES (ANESTHESIA): Performed by: SURGERY

## 2018-07-12 PROCEDURE — 87205 SMEAR GRAM STAIN: CPT

## 2018-07-12 PROCEDURE — 6360000002 HC RX W HCPCS

## 2018-07-12 PROCEDURE — 7100000000 HC PACU RECOVERY - FIRST 15 MIN: Performed by: SURGERY

## 2018-07-12 PROCEDURE — 2580000003 HC RX 258: Performed by: SURGERY

## 2018-07-12 PROCEDURE — 2500000003 HC RX 250 WO HCPCS

## 2018-07-12 PROCEDURE — 6360000002 HC RX W HCPCS: Performed by: SURGERY

## 2018-07-12 PROCEDURE — 2500000003 HC RX 250 WO HCPCS: Performed by: SURGERY

## 2018-07-12 PROCEDURE — 2720000010 HC SURG SUPPLY STERILE: Performed by: SURGERY

## 2018-07-12 PROCEDURE — 87070 CULTURE OTHR SPECIMN AEROBIC: CPT

## 2018-07-12 PROCEDURE — 7100000001 HC PACU RECOVERY - ADDTL 15 MIN: Performed by: SURGERY

## 2018-07-12 PROCEDURE — 3600000012 HC SURGERY LEVEL 2 ADDTL 15MIN: Performed by: SURGERY

## 2018-07-12 PROCEDURE — 87075 CULTR BACTERIA EXCEPT BLOOD: CPT

## 2018-07-12 PROCEDURE — 3700000000 HC ANESTHESIA ATTENDED CARE: Performed by: SURGERY

## 2018-07-12 PROCEDURE — 3600000002 HC SURGERY LEVEL 2 BASE: Performed by: SURGERY

## 2018-07-12 RX ORDER — OXYCODONE HYDROCHLORIDE AND ACETAMINOPHEN 5; 325 MG/1; MG/1
1 TABLET ORAL EVERY 4 HOURS PRN
Status: DISCONTINUED | OUTPATIENT
Start: 2018-07-12 | End: 2018-07-12 | Stop reason: HOSPADM

## 2018-07-12 RX ORDER — HYDROCODONE BITARTRATE AND ACETAMINOPHEN 5; 325 MG/1; MG/1
1 TABLET ORAL PRN
Status: DISCONTINUED | OUTPATIENT
Start: 2018-07-12 | End: 2018-07-12 | Stop reason: HOSPADM

## 2018-07-12 RX ORDER — BUPIVACAINE HYDROCHLORIDE 2.5 MG/ML
INJECTION, SOLUTION EPIDURAL; INFILTRATION; INTRACAUDAL PRN
Status: DISCONTINUED | OUTPATIENT
Start: 2018-07-12 | End: 2018-07-12 | Stop reason: HOSPADM

## 2018-07-12 RX ORDER — KETOROLAC TROMETHAMINE 30 MG/ML
INJECTION, SOLUTION INTRAMUSCULAR; INTRAVENOUS PRN
Status: DISCONTINUED | OUTPATIENT
Start: 2018-07-12 | End: 2018-07-12 | Stop reason: SDUPTHER

## 2018-07-12 RX ORDER — SODIUM CHLORIDE 0.9 % (FLUSH) 0.9 %
10 SYRINGE (ML) INJECTION EVERY 12 HOURS SCHEDULED
Status: DISCONTINUED | OUTPATIENT
Start: 2018-07-12 | End: 2018-07-12 | Stop reason: HOSPADM

## 2018-07-12 RX ORDER — GLYCOPYRROLATE 1 MG/5 ML
SYRINGE (ML) INTRAVENOUS PRN
Status: DISCONTINUED | OUTPATIENT
Start: 2018-07-12 | End: 2018-07-12 | Stop reason: SDUPTHER

## 2018-07-12 RX ORDER — FENTANYL CITRATE 50 UG/ML
INJECTION, SOLUTION INTRAMUSCULAR; INTRAVENOUS PRN
Status: DISCONTINUED | OUTPATIENT
Start: 2018-07-12 | End: 2018-07-12 | Stop reason: SDUPTHER

## 2018-07-12 RX ORDER — HYDROCODONE BITARTRATE AND ACETAMINOPHEN 5; 325 MG/1; MG/1
2 TABLET ORAL PRN
Status: DISCONTINUED | OUTPATIENT
Start: 2018-07-12 | End: 2018-07-12 | Stop reason: HOSPADM

## 2018-07-12 RX ORDER — MIDAZOLAM HYDROCHLORIDE 1 MG/ML
INJECTION INTRAMUSCULAR; INTRAVENOUS PRN
Status: DISCONTINUED | OUTPATIENT
Start: 2018-07-12 | End: 2018-07-12 | Stop reason: SDUPTHER

## 2018-07-12 RX ORDER — SODIUM CHLORIDE 9 MG/ML
INJECTION, SOLUTION INTRAVENOUS CONTINUOUS
Status: DISCONTINUED | OUTPATIENT
Start: 2018-07-12 | End: 2018-07-12 | Stop reason: HOSPADM

## 2018-07-12 RX ORDER — KETOROLAC TROMETHAMINE 30 MG/ML
INJECTION, SOLUTION INTRAMUSCULAR; INTRAVENOUS
Status: COMPLETED
Start: 2018-07-12 | End: 2018-07-12

## 2018-07-12 RX ORDER — MEPERIDINE HYDROCHLORIDE 50 MG/ML
12.5 INJECTION INTRAMUSCULAR; INTRAVENOUS; SUBCUTANEOUS EVERY 5 MIN PRN
Status: DISCONTINUED | OUTPATIENT
Start: 2018-07-12 | End: 2018-07-12 | Stop reason: HOSPADM

## 2018-07-12 RX ORDER — PROMETHAZINE HYDROCHLORIDE 25 MG/ML
6.25 INJECTION, SOLUTION INTRAMUSCULAR; INTRAVENOUS EVERY 10 MIN PRN
Status: DISCONTINUED | OUTPATIENT
Start: 2018-07-12 | End: 2018-07-12 | Stop reason: HOSPADM

## 2018-07-12 RX ORDER — ONDANSETRON 2 MG/ML
INJECTION INTRAMUSCULAR; INTRAVENOUS PRN
Status: DISCONTINUED | OUTPATIENT
Start: 2018-07-12 | End: 2018-07-12 | Stop reason: SDUPTHER

## 2018-07-12 RX ORDER — PROPOFOL 10 MG/ML
INJECTION, EMULSION INTRAVENOUS PRN
Status: DISCONTINUED | OUTPATIENT
Start: 2018-07-12 | End: 2018-07-12 | Stop reason: SDUPTHER

## 2018-07-12 RX ORDER — MORPHINE SULFATE 2 MG/ML
2 INJECTION, SOLUTION INTRAMUSCULAR; INTRAVENOUS EVERY 5 MIN PRN
Status: DISCONTINUED | OUTPATIENT
Start: 2018-07-12 | End: 2018-07-12 | Stop reason: HOSPADM

## 2018-07-12 RX ORDER — SODIUM CHLORIDE 0.9 % (FLUSH) 0.9 %
10 SYRINGE (ML) INJECTION PRN
Status: DISCONTINUED | OUTPATIENT
Start: 2018-07-12 | End: 2018-07-12 | Stop reason: HOSPADM

## 2018-07-12 RX ORDER — MORPHINE SULFATE 2 MG/ML
1 INJECTION, SOLUTION INTRAMUSCULAR; INTRAVENOUS EVERY 5 MIN PRN
Status: DISCONTINUED | OUTPATIENT
Start: 2018-07-12 | End: 2018-07-12 | Stop reason: HOSPADM

## 2018-07-12 RX ORDER — KETOROLAC TROMETHAMINE 30 MG/ML
15 INJECTION, SOLUTION INTRAMUSCULAR; INTRAVENOUS ONCE
Status: COMPLETED | OUTPATIENT
Start: 2018-07-12 | End: 2018-07-12

## 2018-07-12 RX ORDER — DEXAMETHASONE SODIUM PHOSPHATE 10 MG/ML
INJECTION INTRAMUSCULAR; INTRAVENOUS PRN
Status: DISCONTINUED | OUTPATIENT
Start: 2018-07-12 | End: 2018-07-12 | Stop reason: SDUPTHER

## 2018-07-12 RX ADMIN — ONDANSETRON 4 MG: 2 INJECTION INTRAMUSCULAR; INTRAVENOUS at 07:55

## 2018-07-12 RX ADMIN — PROPOFOL 200 MG: 10 INJECTION, EMULSION INTRAVENOUS at 07:50

## 2018-07-12 RX ADMIN — SODIUM CHLORIDE: 9 INJECTION, SOLUTION INTRAVENOUS at 08:33

## 2018-07-12 RX ADMIN — FENTANYL CITRATE 25 MCG: 50 INJECTION, SOLUTION INTRAMUSCULAR; INTRAVENOUS at 08:43

## 2018-07-12 RX ADMIN — FENTANYL CITRATE 25 MCG: 50 INJECTION, SOLUTION INTRAMUSCULAR; INTRAVENOUS at 08:35

## 2018-07-12 RX ADMIN — KETOROLAC TROMETHAMINE 15 MG: 30 INJECTION, SOLUTION INTRAMUSCULAR; INTRAVENOUS at 09:08

## 2018-07-12 RX ADMIN — PHENYLEPHRINE HYDROCHLORIDE 40 MCG: 10 INJECTION INTRAVENOUS at 08:36

## 2018-07-12 RX ADMIN — CEFAZOLIN SODIUM 2 G: 2 SOLUTION INTRAVENOUS at 07:50

## 2018-07-12 RX ADMIN — Medication 0.2 MG: at 07:52

## 2018-07-12 RX ADMIN — FENTANYL CITRATE 25 MCG: 50 INJECTION, SOLUTION INTRAMUSCULAR; INTRAVENOUS at 08:12

## 2018-07-12 RX ADMIN — SODIUM CHLORIDE: 9 INJECTION, SOLUTION INTRAVENOUS at 07:47

## 2018-07-12 RX ADMIN — MIDAZOLAM 2 MG: 1 INJECTION INTRAMUSCULAR; INTRAVENOUS at 07:47

## 2018-07-12 RX ADMIN — LIDOCAINE HYDROCHLORIDE 100 MG: 20 INJECTION, SOLUTION INTRAVENOUS at 07:50

## 2018-07-12 RX ADMIN — DEXAMETHASONE SODIUM PHOSPHATE 10 MG: 10 INJECTION INTRAMUSCULAR; INTRAVENOUS at 07:55

## 2018-07-12 RX ADMIN — KETOROLAC TROMETHAMINE 30 MG: 30 INJECTION, SOLUTION INTRAMUSCULAR; INTRAVENOUS at 08:14

## 2018-07-12 ASSESSMENT — PULMONARY FUNCTION TESTS
PIF_VALUE: 17
PIF_VALUE: 3
PIF_VALUE: 14
PIF_VALUE: 3
PIF_VALUE: 18
PIF_VALUE: 0
PIF_VALUE: 17
PIF_VALUE: 1
PIF_VALUE: 4
PIF_VALUE: 14
PIF_VALUE: 18
PIF_VALUE: 3
PIF_VALUE: 17
PIF_VALUE: 3
PIF_VALUE: 19
PIF_VALUE: 16
PIF_VALUE: 16
PIF_VALUE: 18
PIF_VALUE: 18
PIF_VALUE: 17
PIF_VALUE: 3
PIF_VALUE: 10
PIF_VALUE: 3
PIF_VALUE: 1
PIF_VALUE: 0
PIF_VALUE: 18
PIF_VALUE: 3
PIF_VALUE: 18
PIF_VALUE: 14
PIF_VALUE: 18
PIF_VALUE: 1
PIF_VALUE: 33
PIF_VALUE: 3
PIF_VALUE: 1
PIF_VALUE: 6
PIF_VALUE: 1
PIF_VALUE: 17
PIF_VALUE: 3
PIF_VALUE: 3
PIF_VALUE: 2
PIF_VALUE: 11
PIF_VALUE: 21
PIF_VALUE: 3
PIF_VALUE: 0
PIF_VALUE: 3
PIF_VALUE: 18
PIF_VALUE: 17
PIF_VALUE: 3
PIF_VALUE: 3
PIF_VALUE: 18
PIF_VALUE: 3
PIF_VALUE: 14
PIF_VALUE: 18

## 2018-07-12 ASSESSMENT — PAIN DESCRIPTION - ORIENTATION
ORIENTATION: RIGHT

## 2018-07-12 ASSESSMENT — LIFESTYLE VARIABLES: SMOKING_STATUS: 1

## 2018-07-12 ASSESSMENT — PAIN SCALES - GENERAL
PAINLEVEL_OUTOF10: 1
PAINLEVEL_OUTOF10: 1
PAINLEVEL_OUTOF10: 0
PAINLEVEL_OUTOF10: 0
PAINLEVEL_OUTOF10: 6

## 2018-07-12 ASSESSMENT — PAIN DESCRIPTION - LOCATION
LOCATION: OTHER (COMMENT)

## 2018-07-12 ASSESSMENT — PAIN DESCRIPTION - PAIN TYPE: TYPE: SURGICAL PAIN

## 2018-07-12 ASSESSMENT — PAIN DESCRIPTION - DESCRIPTORS: DESCRIPTORS: ACHING;DISCOMFORT

## 2018-07-12 NOTE — H&P (VIEW-ONLY)
Social History   Substance Use Topics    Smoking status: Current Every Day Smoker       Packs/day: 1.00       Years: 20.00    Smokeless tobacco: Never Used    Alcohol use Yes          Comment: ocassional            Allergies   Allergen Reactions    Ultram [Tramadol Hcl]         confused      Current Facility-Administered Medications   No current outpatient prescriptions on file.      No current facility-administered medications for this visit.          Review of Systems   Constitutional: Positive for chills and malaise/fatigue. HENT: Negative. Eyes: Negative. Respiratory: Positive for cough and wheezing. Cardiovascular: Negative. Gastrointestinal: Negative. Genitourinary: Negative. Musculoskeletal: Negative. Skin: Negative. Neurological: Negative. Endo/Heme/Allergies: Negative. Psychiatric/Behavioral: Negative.       /68 (Site: Left Arm, Position: Sitting, Cuff Size: Medium Adult)   Pulse 76   Temp 98.1 °F (36.7 °C) (Oral)   Resp 16   Ht 5' 2\" (1.575 m)   Wt 157 lb (71.2 kg)   SpO2 98%   BMI 28.72 kg/m²   Physical Exam   Constitutional: She is oriented to person, place, and time. She appears well-developed. HENT:   Head: Normocephalic and atraumatic. Eyes: EOM are normal. Pupils are equal, round, and reactive to light. Neck: Normal range of motion. No tracheal deviation present. No thyromegaly present. Cardiovascular: Normal rate and regular rhythm. Pulmonary/Chest: Effort normal and breath sounds normal. Right breast exhibits mass. Right breast exhibits no inverted nipple, no nipple discharge, no skin change and no tenderness. Left breast exhibits no inverted nipple, no mass, no nipple discharge, no skin change and no tenderness. Abdominal: Soft. She exhibits no distension. There is tenderness (mild diffuse tenderness). Musculoskeletal: Normal range of motion. She exhibits no edema.    Neurological: She is alert and oriented to person, place, and time.   Skin: Skin is warm and dry. Psychiatric: She has a normal mood and affect. Her behavior is normal. Judgment and thought content normal.      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  Calponin and p63  immunostains show a lack of myoepithelial layer which supports the  interpretation.  The tumor has a Melany score of 2 (tubule formation)  +2 (nuclear pleomorphism) +1 (mitotic count) = 5.  Intradepartmental  consultation is obtained. Breast Cancer Marker Studies:  Estrogen Receptors (ER):  Positive:         Percentage of cells positive: >90%         Intensity:  Strong    Progesterone Receptors (MN):  Positive:         Percentage of cells positive:  80%         Intensity:  Moderate     Her-2/marc (c-erb B-2) protein expression:  1+/negative     ASSESSMENT/PLAN:  Right IDC ER/MN + Her 2 marc negative  --Ultrasound right axilla  --CXR, CBC, CMP (patient asked for SED rate and CRP)  --genetics testing  --I discussed lumpectomy vs. Mastectomy vs. Reduction with oncoplastic reconstruction. She is interested in reduction--referral to plastics [facility])  --will refer to Dr. Bon Juarez as well with her history of autoimmune disease to make sure there is no contraindication to radiation. --I discussed smoking cessation and necessity for wound healing and potential reconstruction. --I discussed the effects of steroids which she sometimes takes. --advised to stop taking the Depo shot      No new issues since she was seen.     [facility]  7/3/2018  10:20 AM

## 2018-07-12 NOTE — ANESTHESIA POSTPROCEDURE EVALUATION
Department of Anesthesiology  Postprocedure Note    Patient: German Martinez  MRN: [id number]  YOB: 1980  Date of evaluation: 7/12/2018  Time:  11:55 AM     Procedure Summary     Date:  07/12/18 Room / Location:  [address]    Anesthesia Start:  8761 Anesthesia Stop:      Procedure:  RIGHTY AXILLARY  EXPLORATION WITH DRAIN PLACEMENT (Right ) Diagnosis:  (POST OP SEROMA )    Surgeon:  Ana Jewell MD Responsible Provider:  Justo Iniguez MD    Anesthesia Type:  general ASA Status:  3          Anesthesia Type: general    Angie Phase I: Angie Score: 10    Angie Phase II: Angie Score: 10    Last vitals: Reviewed and per EMR flowsheets.        Anesthesia Post Evaluation    Patient location during evaluation: PACU  Patient participation: complete - patient participated  Level of consciousness: awake  Pain score: 3  Airway patency: patent  Nausea & Vomiting: no nausea and no vomiting  Complications: no  Cardiovascular status: blood pressure returned to baseline  Respiratory status: acceptable  Hydration status: euvolemic

## 2018-07-12 NOTE — INTERVAL H&P NOTE
Patient develop right axilla seroma over the weekend. She had an US guided drainage on Tuesday, but the seroma recurred. I discussed with her today that this could be secondary to a clip coming off a lymphatic, an unclipped lymphatic, or due to her underlying granulomatous disease. The axilla does not appear to be infected and the drainage on Tuesday was not infected. I explained that antibiotics will not be needed postop if no signs of infections. I explained how to care for the drain and to record daily output and color. She will keep the drain until seen next week Tuesday.

## 2018-07-12 NOTE — BRIEF OP NOTE
Brief Postoperative Note  ______________________________________________________________    Patient: Eulogio Duval  YOB: 1980  MRN: [id number]  Date of Procedure: 7/12/2018    Pre-Op Diagnosis: POST OP SEROMA     Post-Op Diagnosis: Same       Procedure(s):  RIGHTY AXILLARY  EXPLORATION WITH DRAIN PLACEMENT    Anesthesia: General    Surgeon(s):  Gogo Camacho MD    Staff:  Scrub Person First: Matthew Serna     Estimated Blood Loss: 5 (units unknown) mL    Complications: None    Specimens:   ID Type Source Tests Collected by Time Destination   1 : RIGHT AXILLA AEROBIC AND ANEROBIC Tissue Breast ANAEROBIC CULTURE, SURGICAL CULTURE Gogo Camacho MD 7/12/2018 0566        Implants:  * No implants in log *      Drains:   Closed/Suction Drain Right Other (Comment) 15 Japanese (Active)   Site Description Unable to view 7/12/2018  8:13 AM   Dressing Status Clean 7/12/2018  8:13 AM       [REMOVED] Urethral Catheter Straight-tip 16 fr (Removed)   Provider Notified to Remove Yes 7/3/2018 12:59 PM       Findings: seroma in axilla, no evidence of infection    Avelina Malcolm MD  Date: 7/12/2018  Time: 8:52 AM

## 2018-07-12 NOTE — ANESTHESIA PRE PROCEDURE
ABGs: No results found for: PHART, PO2ART, PRS0MVQ, FXI8UIE, BEART, S0WWHXXI     Type & Screen (If Applicable):  No results found for: [facility]        Anesthesia Evaluation  Patient summary reviewed and Nursing notes reviewed no history of anesthetic complications:   Airway: Mallampati: II  TM distance: <3 FB   Neck ROM: full  Mouth opening: > = 3 FB Dental: normal exam     Comment: Pt denies chipped loose/missing teeth     Pulmonary:normal exam    (+) current smoker          Patient did not smoke on day of surgery. Cardiovascular:Negative CV ROS            Rhythm: regular  Rate: normal           Beta Blocker:  Not on Beta Blocker         Neuro/Psych:   Negative Neuro/Psych ROS              GI/Hepatic/Renal: Neg GI/Hepatic/Renal ROS            Endo/Other:    (+) malignancy/cancer (hx of malignant neoplasm of right breast). Abdominal:       Abdomen: soft. Vascular: negative vascular ROS. Anesthesia Plan      general     ASA 3     (#20 Left AC)  Induction: intravenous. MIPS: Postoperative opioids intended, Prophylactic antiemetics administered and Postoperative trial extubation. Anesthetic plan and risks discussed with patient. Use of blood products discussed with patient whom consented to blood products. Plan discussed with CRNA and attending. Flory Aguilar RN   7/12/2018        Pt seen, examined, chart reviewed, plan discussed. Flory Aguilar  7/12/2018  7:16 AM     Pt seen, examined, chart reviewed, plan discussed.   Juanito Halsted Matteucci  7/12/2018  8:19 AM

## 2018-07-12 NOTE — PROGRESS NOTES
Discharge instructions gone over with patient and patients friend all questions answered at this time . 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
effort to keep you informed of delays. If any delays occur with your procedure, we apologize ahead of time for your inconvenience as we recognize the value of your time.

## 2018-07-13 ENCOUNTER — PREP FOR PROCEDURE (OUTPATIENT)
Dept: BREAST CENTER | Age: 38
End: 2018-07-13

## 2018-07-13 RX ORDER — SODIUM CHLORIDE 0.9 % (FLUSH) 0.9 %
10 SYRINGE (ML) INJECTION EVERY 12 HOURS SCHEDULED
Status: CANCELLED | OUTPATIENT
Start: 2018-07-13 | End: 2019-07-13

## 2018-07-13 RX ORDER — SODIUM CHLORIDE 0.9 % (FLUSH) 0.9 %
10 SYRINGE (ML) INJECTION PRN
Status: CANCELLED | OUTPATIENT
Start: 2018-07-13 | End: 2019-07-13

## 2018-07-14 LAB
CULTURE SURGICAL: NORMAL
GRAM STAIN RESULT: NORMAL

## 2018-07-16 NOTE — H&P
Department of Plastic Surgery - Adult  Attending Consult Note      CHIEF COMPLAINT:   Right breast cancer    History Obtained From:  patient    HISTORY OF PRESENT ILLNESS:                The patient is a 40 y.o. female who presents for follow up today from bilateral breast reduction. Denies fever, nausea, vomiting, leg pain or swelling, pain is mild to moderate. She states she is taking her ABX as directed as well as analgesia PRN. She voices no complaints at this time. She states she has only taken a few pain pills and is only taking Tylenol and ibuprofen at this time. She is following with her breast surgeon Dr. Rocio Rosado in 2 weeks.         Past Medical History:    No past medical history on file. Past Surgical History:    Past Surgical History:   Procedure Laterality Date    ABDOMEN SURGERY      laparotomy    BREAST REDUCTION SURGERY Bilateral 7/3/2018    RIGHT ONCOPLASTIC BREAST REDUCTION, MATCHING LEFT BREAST REDUCTION performed by Catina Callaway MD at [address] [place] [address] [facility]     LYMPH NODE BIOPSY      CT BIOPSY OF BREAST, NEEDLE CORE Right 7/3/2018    RIGHT BREAST NEEDLE LOCALIZATION LUMPECTOMY SENTINEL LYMPH NODE BIOPSY - TRIDENT AND NEOPROBE performed by Jessenia Salvador MD at [address] [place] [address] Right 7/12/2018    RIGHTY AXILLARY  EXPLORATION WITH DRAIN PLACEMENT performed by Jessenia Salvador MD at [facility]       Current Medications:   No current facility-administered medications for this encounter. No current outpatient prescriptions on file. Allergies:  Ultram [tramadol hcl]    Social History:   Social History     Social History    Marital status: Single     Spouse name: N/A    Number of children: N/A    Years of education: N/A     Occupational History    Not on file.      Social History Main Topics    Smoking status: Current Every Day Smoker     Packs/day: 1.00     Years: 20.00    Smokeless

## 2018-07-17 ENCOUNTER — ANESTHESIA (OUTPATIENT)
Dept: OPERATING ROOM | Age: 38
End: 2018-07-17
Payer: COMMERCIAL

## 2018-07-17 ENCOUNTER — HOSPITAL ENCOUNTER (OUTPATIENT)
Age: 38
Setting detail: OUTPATIENT SURGERY
Discharge: HOME | End: 2018-07-17
Attending: SURGERY | Admitting: SURGERY
Payer: COMMERCIAL

## 2018-07-17 VITALS
DIASTOLIC BLOOD PRESSURE: 75 MMHG | RESPIRATION RATE: 20 BRPM | TEMPERATURE: 98.2 F | WEIGHT: 165 LBS | HEART RATE: 71 BPM | BODY MASS INDEX: 30.36 KG/M2 | SYSTOLIC BLOOD PRESSURE: 114 MMHG | OXYGEN SATURATION: 96 % | HEIGHT: 62 IN

## 2018-07-17 VITALS — SYSTOLIC BLOOD PRESSURE: 109 MMHG | DIASTOLIC BLOOD PRESSURE: 83 MMHG | OXYGEN SATURATION: 95 % | TEMPERATURE: 97.2 F

## 2018-07-17 DIAGNOSIS — G89.18 POST-OP PAIN: Primary | ICD-10-CM

## 2018-07-17 DIAGNOSIS — C50.411 MALIGNANT NEOPLASM OF UPPER-OUTER QUADRANT OF RIGHT BREAST IN FEMALE, ESTROGEN RECEPTOR POSITIVE (HCC): ICD-10-CM

## 2018-07-17 DIAGNOSIS — Z17.0 MALIGNANT NEOPLASM OF UPPER-OUTER QUADRANT OF RIGHT BREAST IN FEMALE, ESTROGEN RECEPTOR POSITIVE (HCC): ICD-10-CM

## 2018-07-17 LAB
ANAEROBIC CULTURE: NORMAL
PREGNANCY TEST URINE, POC: NEGATIVE

## 2018-07-17 PROCEDURE — 3700000001 HC ADD 15 MINUTES (ANESTHESIA): Performed by: SURGERY

## 2018-07-17 PROCEDURE — 2709999900 HC NON-CHARGEABLE SUPPLY: Performed by: SURGERY

## 2018-07-17 PROCEDURE — 7100000001 HC PACU RECOVERY - ADDTL 15 MIN: Performed by: SURGERY

## 2018-07-17 PROCEDURE — 3700000000 HC ANESTHESIA ATTENDED CARE: Performed by: SURGERY

## 2018-07-17 PROCEDURE — 2580000003 HC RX 258: Performed by: PHYSICIAN ASSISTANT

## 2018-07-17 PROCEDURE — 6360000002 HC RX W HCPCS: Performed by: PHYSICIAN ASSISTANT

## 2018-07-17 PROCEDURE — 3600000002 HC SURGERY LEVEL 2 BASE: Performed by: SURGERY

## 2018-07-17 PROCEDURE — 3600000012 HC SURGERY LEVEL 2 ADDTL 15MIN: Performed by: SURGERY

## 2018-07-17 PROCEDURE — 6360000002 HC RX W HCPCS: Performed by: SURGERY

## 2018-07-17 PROCEDURE — 7100000011 HC PHASE II RECOVERY - ADDTL 15 MIN: Performed by: SURGERY

## 2018-07-17 PROCEDURE — 19301 PARTIAL MASTECTOMY: CPT | Performed by: SURGERY

## 2018-07-17 PROCEDURE — 7100000010 HC PHASE II RECOVERY - FIRST 15 MIN: Performed by: SURGERY

## 2018-07-17 PROCEDURE — 7100000000 HC PACU RECOVERY - FIRST 15 MIN: Performed by: SURGERY

## 2018-07-17 PROCEDURE — A4648 IMPLANTABLE TISSUE MARKER: HCPCS | Performed by: SURGERY

## 2018-07-17 PROCEDURE — 6370000000 HC RX 637 (ALT 250 FOR IP): Performed by: PLASTIC SURGERY

## 2018-07-17 PROCEDURE — 2500000003 HC RX 250 WO HCPCS: Performed by: PLASTIC SURGERY

## 2018-07-17 PROCEDURE — 88307 TISSUE EXAM BY PATHOLOGIST: CPT

## 2018-07-17 PROCEDURE — 19380 REVJ RECONSTRUCTED BREAST: CPT | Performed by: PLASTIC SURGERY

## 2018-07-17 PROCEDURE — 2500000003 HC RX 250 WO HCPCS: Performed by: PHYSICIAN ASSISTANT

## 2018-07-17 PROCEDURE — 2720000010 HC SURG SUPPLY STERILE: Performed by: SURGERY

## 2018-07-17 RX ORDER — FENTANYL CITRATE 50 UG/ML
INJECTION, SOLUTION INTRAMUSCULAR; INTRAVENOUS PRN
Status: DISCONTINUED | OUTPATIENT
Start: 2018-07-17 | End: 2018-07-17 | Stop reason: SDUPTHER

## 2018-07-17 RX ORDER — GLYCOPYRROLATE 1 MG/5 ML
SYRINGE (ML) INTRAVENOUS PRN
Status: DISCONTINUED | OUTPATIENT
Start: 2018-07-17 | End: 2018-07-17 | Stop reason: SDUPTHER

## 2018-07-17 RX ORDER — DIAPER,BRIEF,INFANT-TODD,DISP
EACH MISCELLANEOUS PRN
Status: DISCONTINUED | OUTPATIENT
Start: 2018-07-17 | End: 2018-07-17 | Stop reason: HOSPADM

## 2018-07-17 RX ORDER — LABETALOL HYDROCHLORIDE 5 MG/ML
10 INJECTION, SOLUTION INTRAVENOUS EVERY 10 MIN PRN
Status: DISCONTINUED | OUTPATIENT
Start: 2018-07-17 | End: 2018-07-17 | Stop reason: HOSPADM

## 2018-07-17 RX ORDER — OXYCODONE HYDROCHLORIDE AND ACETAMINOPHEN 5; 325 MG/1; MG/1
1 TABLET ORAL EVERY 4 HOURS PRN
Status: ON HOLD | COMMUNITY
End: 2018-07-17

## 2018-07-17 RX ORDER — MORPHINE SULFATE 2 MG/ML
1 INJECTION, SOLUTION INTRAMUSCULAR; INTRAVENOUS EVERY 5 MIN PRN
Status: DISCONTINUED | OUTPATIENT
Start: 2018-07-17 | End: 2018-07-17 | Stop reason: HOSPADM

## 2018-07-17 RX ORDER — SODIUM CHLORIDE 0.9 % (FLUSH) 0.9 %
10 SYRINGE (ML) INJECTION PRN
Status: DISCONTINUED | OUTPATIENT
Start: 2018-07-17 | End: 2018-07-17 | Stop reason: SDUPTHER

## 2018-07-17 RX ORDER — NEOSTIGMINE METHYLSULFATE 1 MG/ML
INJECTION, SOLUTION INTRAVENOUS PRN
Status: DISCONTINUED | OUTPATIENT
Start: 2018-07-17 | End: 2018-07-17 | Stop reason: SDUPTHER

## 2018-07-17 RX ORDER — SODIUM CHLORIDE 0.9 % (FLUSH) 0.9 %
10 SYRINGE (ML) INJECTION PRN
Status: DISCONTINUED | OUTPATIENT
Start: 2018-07-17 | End: 2018-07-17 | Stop reason: HOSPADM

## 2018-07-17 RX ORDER — LIDOCAINE HYDROCHLORIDE 20 MG/ML
INJECTION, SOLUTION INFILTRATION; PERINEURAL PRN
Status: DISCONTINUED | OUTPATIENT
Start: 2018-07-17 | End: 2018-07-17

## 2018-07-17 RX ORDER — KETOROLAC TROMETHAMINE 30 MG/ML
INJECTION, SOLUTION INTRAMUSCULAR; INTRAVENOUS PRN
Status: DISCONTINUED | OUTPATIENT
Start: 2018-07-17 | End: 2018-07-17 | Stop reason: SDUPTHER

## 2018-07-17 RX ORDER — DEXAMETHASONE SODIUM PHOSPHATE 10 MG/ML
INJECTION, SOLUTION INTRAMUSCULAR; INTRAVENOUS PRN
Status: DISCONTINUED | OUTPATIENT
Start: 2018-07-17 | End: 2018-07-17 | Stop reason: SDUPTHER

## 2018-07-17 RX ORDER — BUPIVACAINE HYDROCHLORIDE AND EPINEPHRINE 2.5; 5 MG/ML; UG/ML
INJECTION, SOLUTION EPIDURAL; INFILTRATION; INTRACAUDAL; PERINEURAL PRN
Status: DISCONTINUED | OUTPATIENT
Start: 2018-07-17 | End: 2018-07-17 | Stop reason: HOSPADM

## 2018-07-17 RX ORDER — MIDAZOLAM HYDROCHLORIDE 1 MG/ML
INJECTION INTRAMUSCULAR; INTRAVENOUS PRN
Status: DISCONTINUED | OUTPATIENT
Start: 2018-07-17 | End: 2018-07-17 | Stop reason: SDUPTHER

## 2018-07-17 RX ORDER — LIDOCAINE HYDROCHLORIDE 20 MG/ML
INJECTION, SOLUTION INFILTRATION; PERINEURAL PRN
Status: DISCONTINUED | OUTPATIENT
Start: 2018-07-17 | End: 2018-07-17 | Stop reason: SDUPTHER

## 2018-07-17 RX ORDER — PROMETHAZINE HYDROCHLORIDE 25 MG/ML
12.5 INJECTION, SOLUTION INTRAMUSCULAR; INTRAVENOUS
Status: DISCONTINUED | OUTPATIENT
Start: 2018-07-17 | End: 2018-07-17 | Stop reason: HOSPADM

## 2018-07-17 RX ORDER — SODIUM CHLORIDE 9 MG/ML
INJECTION, SOLUTION INTRAVENOUS CONTINUOUS
Status: DISCONTINUED | OUTPATIENT
Start: 2018-07-17 | End: 2018-07-17 | Stop reason: HOSPADM

## 2018-07-17 RX ORDER — ROCURONIUM BROMIDE 10 MG/ML
INJECTION, SOLUTION INTRAVENOUS PRN
Status: DISCONTINUED | OUTPATIENT
Start: 2018-07-17 | End: 2018-07-17 | Stop reason: SDUPTHER

## 2018-07-17 RX ORDER — PROPOFOL 10 MG/ML
INJECTION, EMULSION INTRAVENOUS PRN
Status: DISCONTINUED | OUTPATIENT
Start: 2018-07-17 | End: 2018-07-17 | Stop reason: SDUPTHER

## 2018-07-17 RX ORDER — SODIUM CHLORIDE 0.9 % (FLUSH) 0.9 %
10 SYRINGE (ML) INJECTION EVERY 12 HOURS SCHEDULED
Status: DISCONTINUED | OUTPATIENT
Start: 2018-07-17 | End: 2018-07-17 | Stop reason: HOSPADM

## 2018-07-17 RX ORDER — SODIUM CHLORIDE 0.9 % (FLUSH) 0.9 %
10 SYRINGE (ML) INJECTION EVERY 12 HOURS SCHEDULED
Status: DISCONTINUED | OUTPATIENT
Start: 2018-07-17 | End: 2018-07-17 | Stop reason: SDUPTHER

## 2018-07-17 RX ORDER — OXYCODONE HYDROCHLORIDE AND ACETAMINOPHEN 5; 325 MG/1; MG/1
1 TABLET ORAL EVERY 6 HOURS PRN
Qty: 20 TABLET | Refills: 0 | Status: SHIPPED | OUTPATIENT
Start: 2018-07-17 | End: 2018-07-22

## 2018-07-17 RX ORDER — ONDANSETRON 2 MG/ML
INJECTION INTRAMUSCULAR; INTRAVENOUS PRN
Status: DISCONTINUED | OUTPATIENT
Start: 2018-07-17 | End: 2018-07-17 | Stop reason: SDUPTHER

## 2018-07-17 RX ADMIN — Medication 0.6 MG: at 10:28

## 2018-07-17 RX ADMIN — SODIUM CHLORIDE: 9 INJECTION, SOLUTION INTRAVENOUS at 08:19

## 2018-07-17 RX ADMIN — SODIUM CHLORIDE: 9 INJECTION, SOLUTION INTRAVENOUS at 09:46

## 2018-07-17 RX ADMIN — FENTANYL CITRATE 50 MCG: 50 INJECTION, SOLUTION INTRAMUSCULAR; INTRAVENOUS at 09:51

## 2018-07-17 RX ADMIN — PROPOFOL 150 MG: 10 INJECTION, EMULSION INTRAVENOUS at 09:51

## 2018-07-17 RX ADMIN — Medication 3 MG: at 10:28

## 2018-07-17 RX ADMIN — KETOROLAC TROMETHAMINE 30 MG: 30 INJECTION, SOLUTION INTRAMUSCULAR at 10:25

## 2018-07-17 RX ADMIN — MIDAZOLAM HYDROCHLORIDE 2 MG: 1 INJECTION, SOLUTION INTRAMUSCULAR; INTRAVENOUS at 09:46

## 2018-07-17 RX ADMIN — ROCURONIUM BROMIDE 30 MG: 10 INJECTION INTRAVENOUS at 09:51

## 2018-07-17 RX ADMIN — ONDANSETRON HYDROCHLORIDE 4 MG: 2 INJECTION, SOLUTION INTRAMUSCULAR; INTRAVENOUS at 10:23

## 2018-07-17 RX ADMIN — PROPOFOL 50 MG: 10 INJECTION, EMULSION INTRAVENOUS at 10:05

## 2018-07-17 RX ADMIN — DEXTROSE MONOHYDRATE 150 MCG/KG/MIN: 50 INJECTION, SOLUTION INTRAVENOUS at 09:51

## 2018-07-17 RX ADMIN — LIDOCAINE HYDROCHLORIDE 100 MG: 20 INJECTION, SOLUTION INFILTRATION; PERINEURAL at 09:51

## 2018-07-17 RX ADMIN — PHENYLEPHRINE HYDROCHLORIDE 100 MCG: 10 INJECTION INTRAMUSCULAR; INTRAVENOUS; SUBCUTANEOUS at 10:10

## 2018-07-17 RX ADMIN — Medication 0.1 MG: at 09:46

## 2018-07-17 RX ADMIN — DEXAMETHASONE SODIUM PHOSPHATE 10 MG: 10 INJECTION, SOLUTION INTRAMUSCULAR; INTRAVENOUS at 10:23

## 2018-07-17 RX ADMIN — CEFAZOLIN SODIUM 2 G: 2 SOLUTION INTRAVENOUS at 09:46

## 2018-07-17 ASSESSMENT — PULMONARY FUNCTION TESTS
PIF_VALUE: 19
PIF_VALUE: 20
PIF_VALUE: 18
PIF_VALUE: 20
PIF_VALUE: 2
PIF_VALUE: 20
PIF_VALUE: 19
PIF_VALUE: 20
PIF_VALUE: 20
PIF_VALUE: 19
PIF_VALUE: 0
PIF_VALUE: 20
PIF_VALUE: 1
PIF_VALUE: 0
PIF_VALUE: 20
PIF_VALUE: 19
PIF_VALUE: 19
PIF_VALUE: 20
PIF_VALUE: 19
PIF_VALUE: 0
PIF_VALUE: 12
PIF_VALUE: 0
PIF_VALUE: 21
PIF_VALUE: 19
PIF_VALUE: 20
PIF_VALUE: 19
PIF_VALUE: 0
PIF_VALUE: 1
PIF_VALUE: 24
PIF_VALUE: 21
PIF_VALUE: 19
PIF_VALUE: 0
PIF_VALUE: 19
PIF_VALUE: 21
PIF_VALUE: 20
PIF_VALUE: 24
PIF_VALUE: 2
PIF_VALUE: 20
PIF_VALUE: 19
PIF_VALUE: 19
PIF_VALUE: 14
PIF_VALUE: 1
PIF_VALUE: 2
PIF_VALUE: 25
PIF_VALUE: 20
PIF_VALUE: 1
PIF_VALUE: 1
PIF_VALUE: 25
PIF_VALUE: 1
PIF_VALUE: 20
PIF_VALUE: 20

## 2018-07-17 ASSESSMENT — PAIN DESCRIPTION - DESCRIPTORS: DESCRIPTORS: SHARP

## 2018-07-17 ASSESSMENT — PAIN SCALES - GENERAL
PAINLEVEL_OUTOF10: 0

## 2018-07-17 ASSESSMENT — PAIN - FUNCTIONAL ASSESSMENT: PAIN_FUNCTIONAL_ASSESSMENT: 0-10

## 2018-07-17 NOTE — H&P
GENERAL SURGERY  H&P NOTE  7/17/2018          Breast Mass: Patient w/ a Hx of breast cancer s/p lumpectomy 7/3 c/b seroma w/ drain placement 7/12. She is back today due to inadequate margins on lumpectomy specimen. She notes 2 days of R flank pain. 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 No pertinent past medical history.      Past Surgical History   History reviewed.  No pertinent surgical history.      Family History               Family History   Problem Relation Age of Onset    Cancer Paternal Uncle 47       small bowel adenocarcinoma    Cancer Paternal Grandfather         lymphoma    Cancer Other         breast    Cancer Other 24       ovarian                       Social History   Substance Use Topics    Smoking status: Current Every Day Smoker       Packs/day: 1.00       Years: 20.00    Smokeless tobacco: Never Used    Alcohol use Yes           Comment: ocassional                Allergies   Allergen Reactions    Ultram [Tramadol Hcl]         confused      Current Facility-Administered Medications   No current outpatient prescriptions on file.      No current facility-administered medications for this visit.          Review of Systems   Constitutional: Negative  HENT: Negative.    Eyes: Negative.    Respiratory: Negative  Cardiovascular: Negative.    Gastrointestinal: Negative.    Genitourinary: Negative.    Musculoskeletal: Negative.    Skin: Negative.    Neurological: Negative.    Endo/Heme/Allergies: Negative.    Psychiatric/Behavioral: Negative.       /68 (Site: Left Arm, Position: Sitting, Cuff Size: Medium Adult)   Pulse 76   Temp 98.1 °F (36.7 °C) (Oral)   Resp 16   Ht 5' 2\" (1.575 m)   Wt 157 lb (71.2 kg)   SpO2 98%   BMI 28.72 kg/m²   Physical Exam   Constitutional: She is oriented to person, place, and time. She appears well-developed. HENT:   Head: Normocephalic and atraumatic. Eyes: EOM are normal. Pupils are equal, round, and reactive to light. Neck: Normal range of motion. No tracheal deviation present. No thyromegaly present. Cardiovascular: Normal rate and regular rhythm.    Pulmonary/Chest: Effort normal and breath sounds normal. Right breast exhibits scar w/ drain present w/ serous output. R flank TTP      Abdominal: Soft. She exhibits no distension. No tenderness  Musculoskeletal: Normal range of motion. She exhibits no edema. Neurological: She is alert and oriented to person, place, and time. Skin: Skin is warm and dry. Psychiatric: She has a normal mood and affect. Her behavior is normal. Judgment and thought content normal.      MAMMOGRAM:       PATHOLOGY:  Right breast, 12:00 core needle biopsy: Invasive, well-differentiated  ductal carcinoma (grade 1)    Comment:      Focal low-grade DCIS is also noted.  Calponin and p63  immunostains show a lack of myoepithelial layer which supports the  interpretation.  The tumor has a [place] score of 2 (tubule formation)  +2 (nuclear pleomorphism) +1 (mitotic count) = 5.  Intradepartmental  consultation is obtained.     Breast Cancer Marker Studies:  Estrogen Receptors (ER):  Positive:         Percentage of cells positive: >90%         Intensity:  Strong    Progesterone Receptors (MT):  Positive:         Percentage of cells positive:  80%         Intensity:  Moderate     Her-2/marc (c-erb B-2) protein expression:  1+/negative     ASSESSMENT/PLAN:  Here for reexcision of lateral lumpectomy margin

## 2018-07-17 NOTE — OP NOTE
DATE OF PROCEDURE: 7/17/2018     SURGEON: Dr. Stacie Ross    ASSISTANT: Dr. Preston Collazo DIAGNOSIS: Right Invasive Ductal Breast Cancer    POSTOPERATIVE DIAGNOSIS: same    OPERATION: Right excision lateral margin    ANESTHESIA: General    ESTIMATED BLOOD LOSS: Minimal    COMPLICATIONS: None    SPECIMENS: Right lateral lumpectomy margin    DRAINS: R axilla    HISTORY: Gaviota Redman is a 40 y.o. female who presented with a lesion in her right breast which on biopsy proved to be invasive ductal carcinoma. Lumpectomy and sentinel lymph node biopsy was performed. She had a positive lateral margin on pathology so a lateral margin excision was recommended. The risks benefits and alternatives of the procedure were discussed with the patient who stated her understanding and agreed to proceed. The patient was specifically counseled that in the event that margins are positive, she would require additional surgery to obtain negative margins. PROCEDURE: The patient was brought to the operating room and positioned supine on the OR table. Sequential compression devices were placed on the patient's lower extremities and functioning. Preoperative antibiotics were administered. Anesthesia was obtained without complication as per the anesthesia record. Immediately prior to the procedure a time-out was called and the surgical checklist was reviewed and agreed upon by all present. The patient was prepped and draped in the usual sterile fashion and the procedure went forth with strict aseptic technique under maximal barrier precautions. Dr. Jonah Salter performed incision and then reconstruction after the lateral margin excision. Please see his note for the remainder of the procedure. The tissue was grasped with Allis clamps and dissection of the lateral margin was carried out using the PEAK Plasma Blade. The specimen was removed and brought to the back table where 5 colors of paint were applied to orient it.  No paint was applied to the medial portion of the specimen. The remainder of the procedure was completed by Dr. Gay Stern. Needle, sponge, and instrument counts were reported as correct x2. The patient tolerated the procedure well without complications. Anesthesia was discontinued and the patient was extubated prior to leaving the OR. She was transferred to the recovery area in good condition. Discharge to home is expected following appropriate post-anesthesia recovery. Dr. Javed Sanchez was present and scrubbed throughout the case.       Electronically signed by Craig Matthews MD on 7/17/18 at 11:47 AM

## 2018-07-17 NOTE — ANESTHESIA PRE PROCEDURE
Department of Anesthesiology  Preprocedure Note       Name:  Terese Rubin   Age:  40 y.o.  :  1980                                          MRN:  [id number]         Date:  2018      Surgeon: Gillian Moody):  MD Blanca Saavedra MD    Procedure: Procedure(s):  RE-EXCISION OF RIGHT BREAST LUMPECTOMY LATERAL MARGIN ---DR. HUERTA -- REVISION BREAST REDUCTION FOLLOWING LUMPECTOMY RE-EXCISION    Medications prior to admission:   Prior to Admission medications    Not on File       Current medications:    Current Facility-Administered Medications   Medication Dose Route Frequency Provider Last Rate Last Dose    sodium chloride flush 0.9 % injection 10 mL  10 mL Intravenous 2 times per day Gaynell Goldberg, PA        sodium chloride flush 0.9 % injection 10 mL  10 mL Intravenous PRN Gaynell Goldberg, PA        0.9 % sodium chloride infusion   Intravenous Continuous Gaynell Goldberg, PA        ceFAZolin (ANCEF) 2 g in dextrose 3 % 50 mL IVPB (duplex)  2 g Intravenous On Call to [address], [place]        ceFAZolin (ANCEF) 2 g in dextrose 3 % 50 mL IVPB (duplex)  2 g Intravenous On Call to [address], MD        sodium chloride flush 0.9 % injection 10 mL  10 mL Intravenous 2 times per day Mahnaz Chester MD        sodium chloride flush 0.9 % injection 10 mL  10 mL Intravenous PRN Mahnaz Chester MD           Allergies: Allergies   Allergen Reactions    Ultram [Tramadol Hcl]      confused       Problem List:    Patient Active Problem List   Diagnosis Code    Malignant neoplasm of upper-outer quadrant of right breast in female, estrogen receptor [facility] C50.411, Z17.0    Personal history of breast cancer Z85.3    Post-op pain G89.18       Past Medical History:  No past medical history on file.     Past Surgical History:        Procedure Laterality Date    ABDOMEN SURGERY      laparotomy    BREAST REDUCTION SURGERY Bilateral 7/3/2018    RIGHT ONCOPLASTIC BREAST REDUCTION, MATCHING LEFT BREAST REDUCTION performed [facility] Mohamud Caldwell [facility]     LYMPH NODE BIOPSY      ND BIOPSY OF BREAST, NEEDLE CORE Right 7/3/2018    RIGHT BREAST NEEDLE LOCALIZATION LUMPECTOMY SENTINEL LYMPH NODE BIOPSY - Teresa Meléndez performed by Joel Pires MD at [address] AXILL,COMPLX Right 7/12/2018    RIGHTY AXILLARY  EXPLORATION WITH DRAIN PLACEMENT performed by Joel Pires MD at [facility] EXTRACTION         Social History:    Social History   Substance Use Topics    Smoking status: Current Every Day Smoker     Packs/day: 1.00     Years: 20.00    Smokeless tobacco: Never Used    Alcohol use Yes      Comment: ocassional                                Ready to quit: Not Answered  Counseling given: Not Answered      Vital Signs (Current):   Vitals:    07/17/18 0743   BP: 111/66   Pulse: 85   Resp: 16   Temp: 98.1 °F (36.7 °C)   TempSrc: Temporal   SpO2: 98%   Weight: 165 lb (74.8 kg)   Height: 5' 2\" (1.575 m)                                              BP Readings from Last 3 Encounters:   07/17/18 111/66   07/12/18 121/66   07/12/18 132/82       NPO Status: Time of last liquid consumption: 2345                        Time of last solid consumption: 2345                        Date of last liquid consumption: 07/16/18                        Date of last solid food consumption: 07/16/18    BMI:   Wt Readings from Last 3 Encounters:   07/17/18 165 lb (74.8 kg)   07/12/18 165 lb (74.8 kg)   07/06/18 165 lb (74.8 kg)     Body mass index is 30.18 kg/m².     CBC:   Lab Results   Component Value Date    WBC 10.0 05/17/2018    RBC 4.30 05/17/2018    HGB 13.0 05/17/2018    HCT 39.5 05/17/2018    MCV 91.9 05/17/2018    RDW 13.2 05/17/2018     05/17/2018       CMP:   Lab Results   Component Value Date     05/17/2018    K 3.6 05/17/2018     05/17/2018    CO2 24 05/17/2018    BUN 5 05/17/2018    CREATININE 0.7 05/17/2018    GFRAA >60 05/17/2018    LABGLOM >60 05/17/2018    GLUCOSE 87 05/17/2018    PROT 7.0 05/17/2018    CALCIUM 8.8 05/17/2018    BILITOT 0.5 05/17/2018    ALKPHOS 146 05/17/2018    AST 12 05/17/2018    ALT 7 05/17/2018       POC Tests: No results for input(s): POCGLU, POCNA, POCK, POCCL, POCBUN, POCHEMO, POCHCT in the last 72 hours. Coags: No results found for: PROTIME, INR, APTT    HCG (If Applicable):   Lab Results   Component Value Date    PREGTESTUR negative 07/12/2018        ABGs: No results found for: PHART, PO2ART, YVL5MPO, FES0THR, BEART, B2OSHPHO     Type & Screen (If Applicable):  No results found for: LABABO, LABRH    Anesthesia Evaluation    Airway: Mallampati: I  TM distance: >3 FB   Neck ROM: full  Mouth opening: > = 3 FB Dental:          Pulmonary: breath sounds clear to auscultation                             Cardiovascular:            Rhythm: regular  Rate: normal                    Neuro/Psych:               GI/Hepatic/Renal:             Endo/Other:                     Abdominal:           Vascular:                                        Anesthesia Plan      general     ASA 1       Induction: intravenous. Anesthetic plan and risks discussed with patient. Plan discussed with CRNA.            8:07 AM  Pt feels well and has been NPO, except for mild pain below r scapula and below her drain site. No questions about GA.       Gamal Johnson MD   7/17/2018

## 2018-07-17 NOTE — OP NOTE
[address]                   [address] [facility] [address] [place] [address]                                 OPERATIVE REPORT    PATIENT NAME: Kelly Velez                   :        1980  MED REC NO:   [id number]                            ROOM:  ACCOUNT NO:   [de-identified]                           ADMIT DATE: 2018  PROVIDER:     Jacey Stewart MD    DATE OF PROCEDURE:  2018    PREOPERATIVE DIAGNOSIS:  Right breast cancer. POSTOPERATIVE DIAGNOSIS:  Right breast cancer. OPERATION PERFORMED:  Revision right breast reduction in order to access  the lumpectomy defect for revision lumpectomy by the breast surgeon. ATTENDING SURGEON:  Jacey Stewart MD    ASSISTANTS:  1. Dick Mckeon PA-C. PA was required for the case due to lack of  adequately trained assistant and was involved in prepping, draping,  retracting, dressing and suturing. 2.  Yakelin Connell MD    ANESTHESIA:  General.    DRAINS:  A 15-[place]amib[place] round Wayne Hubless drain. SPECIMEN:  No specimens for my portion of the case. COMPLICATIONS:  No complications. PREOPERATIVE INDICATIONS:  The patient is a 80-year-old female who  underwent oncoplastic breast reduction on the right side as well as a  matching left breast reduction. The patient had positive lumpectomy  margins laterally. It was elected to proceed with revision right breast  reduction in order to access the lumpectomy cavity to maintain additional  margins. Risks, benefits, and alternatives were explained to the patient  including bleeding, scarring, infection, contour irregularities and need  for further surgery, she understood and elected to proceed. PROCEDURE IN DETAIL:  She was seen on the day of surgery, marked, and all  questions were answered. She was taken back to the operating room and  placed in the supine position. SCDs were on and functioning at the time of  induction of anesthesia.   Preoperative antibiotics were given prior to  incision. The axillary drain was removed prior to prepping. The lateral  periareolar and vertical component of the incision was incised and the  inferiorly based pedicle was identified. Dissection was carried bluntly  along the pedicle down to the lumpectomy cavity where clips were  encountered. At this point in time, the case was handed over to Dr. Javed Sanchez for her portions. Once her revision lumpectomy was complete,  hemostasis was achieved with monopolar cautery. The wound was irrigated  with normal saline. Digital palpation then accessed an additional fluid  collection in the periaxillary area. A new 15-[place]oz[place] round Lowella Kaye Hubless  drain was placed through the previous drain hole into this cavity and along  the new lumpectomy cavity line. This was sutured in placed with a 2-0  Prolene. The pedicle was allowed to re-drape against lateral cephalic  breast flaps. Closure was performed with a 3-0 Monocryl  deep dermal and 4-0 Monocryl running subcuticular stitch. Bacitracin was  applied followed by ABD pad and surgical bra. The patient emerged from  anesthesia without complication and taken to PACU in good condition.         Carlos Peoples MD    D: 07/17/2018 10:40:09       T: 07/17/2018 11:27:58     [email or web address]  Job#: [id number]     Doc#: [id number]    CC:

## 2018-07-20 ENCOUNTER — OFFICE VISIT (OUTPATIENT)
Dept: BREAST CENTER | Age: 38
End: 2018-07-20
Payer: COMMERCIAL

## 2018-07-20 VITALS
DIASTOLIC BLOOD PRESSURE: 76 MMHG | WEIGHT: 163 LBS | OXYGEN SATURATION: 98 % | HEART RATE: 103 BPM | BODY MASS INDEX: 30 KG/M2 | TEMPERATURE: 98.6 F | SYSTOLIC BLOOD PRESSURE: 122 MMHG | RESPIRATION RATE: 16 BRPM | HEIGHT: 62 IN

## 2018-07-20 DIAGNOSIS — L03.313 CELLULITIS OF CHEST WALL: Primary | ICD-10-CM

## 2018-07-20 PROCEDURE — 99024 POSTOP FOLLOW-UP VISIT: CPT | Performed by: SURGERY

## 2018-07-20 RX ORDER — IBUPROFEN 200 MG
200 TABLET ORAL EVERY 6 HOURS PRN
COMMUNITY
End: 2018-09-27

## 2018-07-20 RX ORDER — SULFAMETHOXAZOLE AND TRIMETHOPRIM 800; 160 MG/1; MG/1
1 TABLET ORAL 2 TIMES DAILY
Qty: 20 TABLET | Refills: 0 | Status: SHIPPED | OUTPATIENT
Start: 2018-07-20 | End: 2018-07-30

## 2018-07-20 NOTE — PROGRESS NOTES
remains <30 and nursing at [facility] will remove drain  --Bactrim for cellulitis; neosporin to incisions  --RTC 2 weeks.     Astrid Hale  7/20/2018  9:59 AM

## 2018-07-27 ENCOUNTER — OFFICE VISIT (OUTPATIENT)
Dept: SURGERY | Age: 38
End: 2018-07-27

## 2018-07-27 ENCOUNTER — TELEPHONE (OUTPATIENT)
Dept: RADIATION ONCOLOGY | Age: 38
End: 2018-07-27

## 2018-07-27 VITALS
DIASTOLIC BLOOD PRESSURE: 74 MMHG | BODY MASS INDEX: 30 KG/M2 | SYSTOLIC BLOOD PRESSURE: 106 MMHG | HEIGHT: 62 IN | WEIGHT: 163 LBS

## 2018-07-27 DIAGNOSIS — Z98.890 S/P BILATERAL BREAST REDUCTION: Primary | ICD-10-CM

## 2018-07-27 PROCEDURE — 99024 POSTOP FOLLOW-UP VISIT: CPT | Performed by: PHYSICIAN ASSISTANT

## 2018-07-27 NOTE — TELEPHONE ENCOUNTER
Dr Lucy Halsted office called to state patient has had her surgery and will be cleared for radiation in a 4-6 week start date.

## 2018-07-27 NOTE — LETTER
July 27, 2018     Patient: Sarah Mata   YOB: 1980   Date of Visit: 7/27/2018       To Whom It May Concern: It is my medical opinion that Delphine Severance may work a 4 hour shift this past week, 7/24/18 through 7/27/18. She may return to work full time with no restrictions on 7/30/18. If you have any questions or concerns, please don't hesitate to call.     Sincerely,        CHATA Hansen

## 2018-07-30 ENCOUNTER — TELEPHONE (OUTPATIENT)
Dept: RADIATION ONCOLOGY | Age: 38
End: 2018-07-30

## 2018-08-03 ENCOUNTER — HOSPITAL ENCOUNTER (OUTPATIENT)
Age: 38
Discharge: HOME | End: 2018-08-05
Payer: COMMERCIAL

## 2018-08-03 ENCOUNTER — OFFICE VISIT (OUTPATIENT)
Dept: BREAST CENTER | Age: 38
End: 2018-08-03
Payer: COMMERCIAL

## 2018-08-03 VITALS
TEMPERATURE: 98.5 F | OXYGEN SATURATION: 99 % | HEART RATE: 82 BPM | SYSTOLIC BLOOD PRESSURE: 124 MMHG | BODY MASS INDEX: 30.55 KG/M2 | WEIGHT: 166 LBS | RESPIRATION RATE: 14 BRPM | DIASTOLIC BLOOD PRESSURE: 70 MMHG | HEIGHT: 62 IN

## 2018-08-03 DIAGNOSIS — R59.9 GRANULOMATOUS ADENOPATHY: Primary | ICD-10-CM

## 2018-08-03 DIAGNOSIS — R59.9 GRANULOMATOUS ADENOPATHY: ICD-10-CM

## 2018-08-03 LAB
BASOPHILS ABSOLUTE: 0.05 E9/L (ref 0–0.2)
BASOPHILS RELATIVE PERCENT: 0.5 % (ref 0–2)
C-REACTIVE PROTEIN: 0.2 MG/DL (ref 0–0.4)
EOSINOPHILS ABSOLUTE: 0.09 E9/L (ref 0.05–0.5)
EOSINOPHILS RELATIVE PERCENT: 1 % (ref 0–6)
HCT VFR BLD CALC: 36.7 % (ref 34–48)
HEMOGLOBIN: 11.9 G/DL (ref 11.5–15.5)
IMMATURE GRANULOCYTES #: 0.03 E9/L
IMMATURE GRANULOCYTES %: 0.3 % (ref 0–5)
LYMPHOCYTES ABSOLUTE: 2.62 E9/L (ref 1.5–4)
LYMPHOCYTES RELATIVE PERCENT: 28.1 % (ref 20–42)
MCH RBC QN AUTO: 30.7 PG (ref 26–35)
MCHC RBC AUTO-ENTMCNC: 32.4 % (ref 32–34.5)
MCV RBC AUTO: 94.8 FL (ref 80–99.9)
MONOCYTES ABSOLUTE: 0.64 E9/L (ref 0.1–0.95)
MONOCYTES RELATIVE PERCENT: 6.9 % (ref 2–12)
NEUTROPHILS ABSOLUTE: 5.89 E9/L (ref 1.8–7.3)
NEUTROPHILS RELATIVE PERCENT: 63.2 % (ref 43–80)
PDW BLD-RTO: 13.5 FL (ref 11.5–15)
PLATELET # BLD: 477 E9/L (ref 130–450)
PMV BLD AUTO: 9.2 FL (ref 7–12)
RBC # BLD: 3.87 E12/L (ref 3.5–5.5)
SEDIMENTATION RATE, ERYTHROCYTE: 23 MM/HR (ref 0–20)
WBC # BLD: 9.3 E9/L (ref 4.5–11.5)

## 2018-08-03 PROCEDURE — 85025 COMPLETE CBC W/AUTO DIFF WBC: CPT

## 2018-08-03 PROCEDURE — 86140 C-REACTIVE PROTEIN: CPT

## 2018-08-03 PROCEDURE — 99024 POSTOP FOLLOW-UP VISIT: CPT | Performed by: SURGERY

## 2018-08-03 PROCEDURE — 85651 RBC SED RATE NONAUTOMATED: CPT

## 2018-08-05 ENCOUNTER — TELEPHONE (OUTPATIENT)
Dept: SURGERY | Age: 38
End: 2018-08-05

## 2018-08-09 ENCOUNTER — OFFICE VISIT (OUTPATIENT)
Dept: ONCOLOGY | Age: 38
End: 2018-08-09
Payer: COMMERCIAL

## 2018-08-09 VITALS
HEIGHT: 62 IN | RESPIRATION RATE: 20 BRPM | BODY MASS INDEX: 30.49 KG/M2 | WEIGHT: 165.7 LBS | HEART RATE: 87 BPM | SYSTOLIC BLOOD PRESSURE: 117 MMHG | TEMPERATURE: 97.5 F | DIASTOLIC BLOOD PRESSURE: 73 MMHG

## 2018-08-09 DIAGNOSIS — Z17.0 MALIGNANT NEOPLASM OF UPPER-OUTER QUADRANT OF RIGHT BREAST IN FEMALE, ESTROGEN RECEPTOR POSITIVE (HCC): ICD-10-CM

## 2018-08-09 DIAGNOSIS — Z85.3 PERSONAL HISTORY OF BREAST CANCER: ICD-10-CM

## 2018-08-09 DIAGNOSIS — C50.411 MALIGNANT NEOPLASM OF UPPER-OUTER QUADRANT OF RIGHT BREAST IN FEMALE, ESTROGEN RECEPTOR POSITIVE (HCC): ICD-10-CM

## 2018-08-09 PROCEDURE — 99244 OFF/OP CNSLTJ NEW/EST MOD 40: CPT | Performed by: INTERNAL MEDICINE

## 2018-08-09 PROCEDURE — 99214 OFFICE O/P EST MOD 30 MIN: CPT | Performed by: INTERNAL MEDICINE

## 2018-08-09 NOTE — PROGRESS NOTES
change. Assessment   Date  8/9/2018   1. Mental Ability: confusion/cognitively impaired    2. Elimination Issues: incontinence, frequency    3. Ambulatory: use of assistive devices (walker, cane, off-loading devices), attached to equipment (IV pole, oxygen)    4. Sensory Limitations: dizziness, vertigo, impaired vision    5. Age less than 72    6. Age 72 or greater    7. Medication: diuretics, strong analgesics, hypnotics, sedatives, antihypertensive agents    8. Falls:  recent history of falls within the last 3 months (not to include slipping or tripping)    TOTAL 0           TABLE 2   Risk Score Risk Level Plan of Care   0-3 Little or  No Risk 1. Provide assistance as indicated for ambulation activities  2. Reorient confused/cognitively impaired patient  3. Call-light/bell within patient's reach  4. Chair/bed in low position, stretcher/bed with siderails up except when performing patient care activities  5. Educate patient/family/caregiver on falls prevention  6.  Reassess in 12 weeks or with any noted change in patient condition which places them at a risk for a fall   4-6 Moderate Risk 1. Provide assistance as indicated for ambulation activities  2. Reorient confused/cognitively impaired patient  3. Call-light/bell within patient's reach  4. Chair/bed in low position, stretcher/bed with siderails up except when performing patient care activities  5. Educate patient/family/caregiver on falls prevention  6. Falls risk precaution (Yellow sticker Level II) placed on patient chart   7 or   Higher High Risk 1. Place patient in easily observable treatment room  2. Patient attended at all times by family member or staff  3. Provide assistance as indicated for ambulation activities  4. Reorient confused/cognitively impaired patient  5. Call-light/bell within patient's reach  6. Chair/bed in low position, stretcher/bed with siderails up except when performing patient care activities  7. consult    b. Referral made to ?  No               Stephanie Robb

## 2018-08-09 NOTE — PROGRESS NOTES
12:00 with associated artifact from a metallic biopsy clip. No suspicious mass or non-mass enhancement seen on the left. There is no lymphadenopathy. Bilateral skin and nipple areolar complexes are normal. Visualized portions of the chest and abdomen are unremarkable. Right breast blue dye injection, lumpectomy, sentinel lymph node excision was performed on 07/03/2018:  A.  Breast, left, reduction mammoplasty: Benign breast tissue with  fibrocystic change  Unremarkable skin    B.  [place] lymph node #1, excision: 2 lymph nodes, negative for  metastatic carcinoma    C.  [facility] [place] lymph node #2, excision: 2 lymph nodes, negative for  metastatic carcinoma    D. Breast, right, oncoplastic reduction: Benign breast tissue with  fibrocystic change  Unremarkable skin    E.  Breast, right, lumpectomy: Invasive well-differentiated ductal   carcinoma (grade 1)  Ductal carcinoma in situ, intermediate nuclear grade  Lateral and posterior margins positive for invasive carcinoma  Ductal carcinoma in situ present less than 1 mm from lateral margin  Previous biopsy site identified  Fibrocystic change    F.  Right breast, additional posterior margin, excision: Fibrocystic  change  Negative for invasive carcinoma  Negative for ductal carcinoma in situ    G.  Right breast, additional medial margin, excision: Benign breast  tissue  Negative for invasive carcinoma  Negative for ductal carcinoma in situ    H.  Right breast, additional inferior margin, excision: Fibrocystic  change  Negative for invasive carcinoma  Negative for ductal carcinoma in situ    CANCER CASE SUMMARY  Procedure: Excision  Specimen laterality: Right  Tumor size: 1.2 x 1 x 0.7 cm (greatest dimension measured  microscopically)  Histologic type:  Invasive carcinoma of no special type (ductal, not  otherwise specified)  Histologic grade ([place] histologic score)       Tubular differentiation: Score 1       Nuclear pleomorphism: Score 2       Mitotic rate: Score 1       Overall grade: Grade 1 (score 4)  Ductal carcinoma in situ: Present, negative for extensive intraductal  component       Nuclear grade: Grade II (intermediate)  Lobular carcinoma in situ: Not identified  Margins: Invasive carcinoma present at lateral/orange inked margin   Final posterior margin negative for carcinoma   Ductal carcinoma in situ present less than 1 mm from lateral/orange  inked margin  Treatment effect: No known presurgical therapy  Pathologic stage classification (pTNM, AJCC 8th Edition): pT1c (sn)pN0 [0  out of 4 lymph nodes]    ER positive >90%  RI positive   80%  HER/2 Negative (1+)    Oncotype DX Breast Cancer Report-Node Negative   Recurrence Score Result-15   Risk category-low risk    On 07/17/2018: Left breast, re-excision: Organizational changes of prior biopsy, no  evidence of residual neoplasm.  Margins of excision are negative for neoplasm. Review of Systems;  CONSTITUTIONAL: No fever, chills. Good appetite and energy level. ENMT: Eyes: No diplopia; Nose: No epistaxis. Mouth: No sore throat. RESPIRATORY: No hemoptysis, shortness of breath, cough. CARDIOVASCULAR: No chest pain, palpitations. GASTROINTESTINAL: No nausea/vomiting, abdominal pain, diarrhea/constipation. GENITOURINARY: No dysuria, urinary frequency, hematuria. NEURO: No syncope, presyncope, headache. Remainder:  ROS NEGATIVE    Past Medical History:  History reviewed. No pertinent past medical history.     Past Surgical History:      Procedure Laterality Date    ABDOMEN SURGERY      laparotomy    BREAST REDUCTION SURGERY Bilateral 7/3/2018    RIGHT ONCOPLASTIC BREAST REDUCTION, MATCHING LEFT BREAST REDUCTION performed by Tejas Swanson MD at [address] [place] [address] [facility]     LYMPH NODE BIOPSY      RI BIOPSY OF BREAST, NEEDLE CORE Right 7/3/2018    RIGHT BREAST NEEDLE LOCALIZATION LUMPECTOMY SENTINEL LYMPH NODE BIOPSY - TRIDENT AND NEOPROBE performed by Lise Marie MD at Positive bowel sounds. EXTREMITIES: Without clubbing, cyanosis, or edema. NEUROLOGIC: No focal deficits. ECOG PS 1    Impression/Plan:  39 y/o female who underwent Right breast blue dye injection, lumpectomy, sentinel lymph node excision on 07/03/2018:  A.  Breast, left, reduction mammoplasty: Benign breast tissue with fibrocystic change  Unremarkable skin    B.  [place] lymph node #1, excision: 2 lymph nodes, negative for metastatic carcinoma    C.  [place] lymph node #2, excision: 2 lymph nodes, negative for metastatic carcinoma    D. Breast, right, oncoplastic reduction: Benign breast tissue with fibrocystic change  Unremarkable skin    E.  Breast, right, lumpectomy: Invasive well-differentiated ductal carcinoma (grade 1)  Ductal carcinoma in situ, intermediate nuclear grade  Lateral and posterior margins positive for invasive carcinoma  Ductal carcinoma in situ present less than 1 mm from lateral margin  Previous biopsy site identified  Fibrocystic change    F.  Right breast, additional posterior margin, excision: Fibrocystic change  Negative for invasive carcinoma  Negative for ductal carcinoma in situ    G.  Right breast, additional medial margin, excision: Benign breast tissue  Negative for invasive carcinoma  Negative for ductal carcinoma in situ    H.  Right breast, additional inferior margin, excision: Fibrocystic change  Negative for invasive carcinoma  Negative for ductal carcinoma in situ    CANCER CASE SUMMARY  Procedure: Excision  Specimen laterality: Right  Tumor size: 1.2 x 1 x 0.7 cm (greatest dimension measured microscopically)  Histologic type:  Invasive carcinoma of no special type (ductal, not otherwise specified)  Histologic grade ([place] histologic score)       Tubular differentiation: Score 1       Nuclear pleomorphism: Score 2       Mitotic rate: Score 1       Overall grade: Grade 1 (score 4)  Ductal carcinoma in situ: Present, negative for extensive intraductal component

## 2018-08-10 ENCOUNTER — TELEPHONE (OUTPATIENT)
Dept: ONCOLOGY | Age: 38
End: 2018-08-10

## 2018-08-15 ENCOUNTER — HOSPITAL ENCOUNTER (OUTPATIENT)
Dept: RADIATION ONCOLOGY | Age: 38
Discharge: HOME | End: 2018-08-15
Payer: COMMERCIAL

## 2018-08-15 ENCOUNTER — TELEPHONE (OUTPATIENT)
Dept: RADIATION ONCOLOGY | Age: 38
End: 2018-08-15

## 2018-08-15 PROCEDURE — 77334 RADIATION TREATMENT AID(S): CPT

## 2018-08-15 PROCEDURE — 77290 THER RAD SIMULAJ FIELD CPLX: CPT

## 2018-08-17 ENCOUNTER — OFFICE VISIT (OUTPATIENT)
Dept: SURGERY | Age: 38
End: 2018-08-17

## 2018-08-17 VITALS
RESPIRATION RATE: 16 BRPM | TEMPERATURE: 98.3 F | OXYGEN SATURATION: 99 % | SYSTOLIC BLOOD PRESSURE: 112 MMHG | HEART RATE: 95 BPM | HEIGHT: 62 IN | DIASTOLIC BLOOD PRESSURE: 82 MMHG | WEIGHT: 163 LBS | BODY MASS INDEX: 30 KG/M2

## 2018-08-17 DIAGNOSIS — Z98.890 S/P BILATERAL BREAST REDUCTION: Primary | ICD-10-CM

## 2018-08-17 PROCEDURE — 99024 POSTOP FOLLOW-UP VISIT: CPT | Performed by: PHYSICIAN ASSISTANT

## 2018-08-17 PROCEDURE — 77300 RADIATION THERAPY DOSE PLAN: CPT

## 2018-08-17 PROCEDURE — 77334 RADIATION TREATMENT AID(S): CPT

## 2018-08-17 PROCEDURE — 77295 3-D RADIOTHERAPY PLAN: CPT

## 2018-08-22 PROCEDURE — 77412 RADIATION TX DELIVERY LVL 3: CPT

## 2018-08-22 PROCEDURE — 77417 THER RADIOLOGY PORT IMAGE(S): CPT

## 2018-08-23 PROCEDURE — 77412 RADIATION TX DELIVERY LVL 3: CPT

## 2018-08-24 PROCEDURE — 77412 RADIATION TX DELIVERY LVL 3: CPT

## 2018-08-27 PROCEDURE — 77412 RADIATION TX DELIVERY LVL 3: CPT

## 2018-08-28 PROCEDURE — 77336 RADIATION PHYSICS CONSULT: CPT

## 2018-08-28 PROCEDURE — 77412 RADIATION TX DELIVERY LVL 3: CPT

## 2018-08-29 ENCOUNTER — HOSPITAL ENCOUNTER (OUTPATIENT)
Dept: RADIATION ONCOLOGY | Age: 38
Discharge: HOME | End: 2018-08-29
Payer: COMMERCIAL

## 2018-08-29 VITALS
SYSTOLIC BLOOD PRESSURE: 112 MMHG | DIASTOLIC BLOOD PRESSURE: 74 MMHG | HEART RATE: 92 BPM | OXYGEN SATURATION: 96 % | BODY MASS INDEX: 30.54 KG/M2 | WEIGHT: 167 LBS | TEMPERATURE: 98.1 F

## 2018-08-29 DIAGNOSIS — C80.1 CANCER (HCC): Primary | ICD-10-CM

## 2018-08-29 PROCEDURE — 99999 PR OFFICE/OUTPT VISIT,PROCEDURE ONLY: CPT | Performed by: RADIOLOGY

## 2018-08-29 PROCEDURE — 77412 RADIATION TX DELIVERY LVL 3: CPT

## 2018-08-29 ASSESSMENT — PAIN DESCRIPTION - DESCRIPTORS: DESCRIPTORS: CONSTANT;ACHING;DISCOMFORT

## 2018-08-29 ASSESSMENT — PAIN DESCRIPTION - LOCATION: LOCATION: HAND;KNEE

## 2018-08-29 NOTE — PROGRESS NOTES
German Martinez  8/29/2018  Wt Readings from Last 3 Encounters:   08/29/18 167 lb (75.8 kg)   08/17/18 163 lb (73.9 kg)   08/09/18 165 lb 11.2 oz (75.2 kg)     Body mass index is 30.54 kg/m². Treatment Area:right whole breast    Patient was seen today for weekly visit. Comfort Alteration  KPS:80%  Fatigue: Severe    Nutritional Alteration  Anorexia: No   Nausea: No   Vomiting: No     Skin Alteration   Sensation:na    Radiation Dermatitis:  na    Mucous Membrane Alteration  Drainage: no  Lymphedema: No    Emotional  Coping: somewhat effective    Sexuality Alteration  na    Injury, potential bleeding or infection: na        Lab Results   Component Value Date    WBC 9.3 08/03/2018     (H) 08/03/2018         /74   Pulse 92   Temp 98.1 °F (36.7 °C) (Oral)   Wt 167 lb (75.8 kg)   SpO2 96%   BMI 30.54 kg/m²   BP within normal range? yes       Assessment/Plan:  Patient has received 6/28 fractions, 1080cGy/5040. Patient does not feel well today-severe fatigue with generalized bilateral joint pain and ache. Patient went to work this morning from 6-10am after sleeping through the night, and napped, with needing to set alarm to make treatment today. Dr Dougie Ocampo made aware. Patient's vitals within normal limits.   Asim Burns

## 2018-08-29 NOTE — PROGRESS NOTES
DEPARTMENT OF RADIATION ONCOLOGY ON TREATMENT VISIT         8/29/2018      NAME:  Kaeton Abernathy    YOB: 1980      Diagnosis: right breast CA      SUBJECTIVE:   Keaton Abernathy has now received 1080 cGy in 6/28 fractions directed to the right whole breast.      Past medical, surgical, social and family histories reviewed and updated as indicated. Pain: controlled      ALLERGIES:  Ultram [tramadol hcl]         Current Outpatient Prescriptions   Medication Sig Dispense Refill    ibuprofen (ADVIL;MOTRIN) 200 MG tablet Take 200 mg by mouth every 6 hours as needed for Pain       No current facility-administered medications for this encounter. OBJECTIVE:  Alert and fully ambulatory. Pleasant and conversant. Physical Examination: General appearance - alert, well appearing, and in no distress. Wt Readings from Last 3 Encounters:   08/29/18 167 lb (75.8 kg)   08/17/18 163 lb (73.9 kg)   08/09/18 165 lb 11.2 oz (75.2 kg)         ASSESSMENT/PLAN:     Patient is tolerating treatments well with expected toxicities. RBA were reviewed prior to first fraction and PRN. Current and planned dose reviewed. Goals of treatment and potential side effects were reviewed with the patient PRN. Treatment imaging has been personally reviewed for accuracy and precision. Questions answered to apparent satisfaction. Treatments will continue as planned. Marly Blue.  Minal Argueta MD MS DABR  Radiation Oncologist          [facility] ([address] [place] [address]): [phone] /// FAX: [phone]  [facility]): [phone] /// FAX: [phone]  [address] [place] [address]): [phone] /// FAX: [phone]

## 2018-08-30 ENCOUNTER — TELEPHONE (OUTPATIENT)
Dept: RADIATION ONCOLOGY | Age: 38
End: 2018-08-30

## 2018-08-30 ENCOUNTER — HOSPITAL ENCOUNTER (OUTPATIENT)
Dept: INFUSION THERAPY | Age: 38
Discharge: HOME | End: 2018-08-30
Payer: COMMERCIAL

## 2018-08-30 VITALS
DIASTOLIC BLOOD PRESSURE: 62 MMHG | RESPIRATION RATE: 18 BRPM | TEMPERATURE: 98.1 F | HEART RATE: 78 BPM | SYSTOLIC BLOOD PRESSURE: 110 MMHG

## 2018-08-30 DIAGNOSIS — C50.411 MALIGNANT NEOPLASM OF UPPER-OUTER QUADRANT OF RIGHT BREAST IN FEMALE, ESTROGEN RECEPTOR POSITIVE (HCC): ICD-10-CM

## 2018-08-30 DIAGNOSIS — Z17.0 MALIGNANT NEOPLASM OF UPPER-OUTER QUADRANT OF RIGHT BREAST IN FEMALE, ESTROGEN RECEPTOR POSITIVE (HCC): ICD-10-CM

## 2018-08-30 PROCEDURE — 2580000003 HC RX 258: Performed by: RADIOLOGY

## 2018-08-30 PROCEDURE — 2580000003 HC RX 258

## 2018-08-30 PROCEDURE — 77417 THER RADIOLOGY PORT IMAGE(S): CPT

## 2018-08-30 PROCEDURE — 77412 RADIATION TX DELIVERY LVL 3: CPT

## 2018-08-30 PROCEDURE — 96360 HYDRATION IV INFUSION INIT: CPT

## 2018-08-30 RX ORDER — 0.9 % SODIUM CHLORIDE 0.9 %
1000 INTRAVENOUS SOLUTION INTRAVENOUS ONCE
Status: COMPLETED | OUTPATIENT
Start: 2018-08-30 | End: 2018-08-30

## 2018-08-30 RX ORDER — SODIUM CHLORIDE 0.9 % (FLUSH) 0.9 %
10 SYRINGE (ML) INJECTION PRN
Status: CANCELLED | OUTPATIENT
Start: 2018-08-30

## 2018-08-30 RX ORDER — 0.9 % SODIUM CHLORIDE 0.9 %
1000 INTRAVENOUS SOLUTION INTRAVENOUS ONCE
Status: CANCELLED | OUTPATIENT
Start: 2018-08-30 | End: 2018-08-30

## 2018-08-30 RX ORDER — SODIUM CHLORIDE 9 MG/ML
INJECTION, SOLUTION INTRAVENOUS
Status: COMPLETED
Start: 2018-08-30 | End: 2018-08-30

## 2018-08-30 RX ORDER — SODIUM CHLORIDE 0.9 % (FLUSH) 0.9 %
10 SYRINGE (ML) INJECTION PRN
Status: DISCONTINUED | OUTPATIENT
Start: 2018-08-30 | End: 2018-08-31 | Stop reason: HOSPADM

## 2018-08-30 RX ADMIN — Medication 1000 ML: at 13:18

## 2018-08-30 RX ADMIN — Medication 10 ML: at 13:15

## 2018-08-30 RX ADMIN — SODIUM CHLORIDE 1000 ML: 9 INJECTION, SOLUTION INTRAVENOUS at 13:18

## 2018-08-30 NOTE — TELEPHONE ENCOUNTER
Nursing called patient  Regarding scheduled IV hydration today @ 1pm-message left for patient on answering machine.

## 2018-08-31 PROCEDURE — 77412 RADIATION TX DELIVERY LVL 3: CPT

## 2018-09-03 ENCOUNTER — HOSPITAL ENCOUNTER (OUTPATIENT)
Dept: RADIATION ONCOLOGY | Age: 38
Discharge: HOME | End: 2018-09-03
Payer: COMMERCIAL

## 2018-09-04 PROCEDURE — 77412 RADIATION TX DELIVERY LVL 3: CPT

## 2018-09-05 ENCOUNTER — HOSPITAL ENCOUNTER (OUTPATIENT)
Dept: RADIATION ONCOLOGY | Age: 38
Discharge: HOME | End: 2018-09-05
Payer: COMMERCIAL

## 2018-09-05 VITALS
WEIGHT: 168.1 LBS | HEART RATE: 88 BPM | OXYGEN SATURATION: 98 % | BODY MASS INDEX: 30.75 KG/M2 | SYSTOLIC BLOOD PRESSURE: 108 MMHG | DIASTOLIC BLOOD PRESSURE: 74 MMHG | TEMPERATURE: 97.9 F

## 2018-09-05 DIAGNOSIS — C80.1 CANCER (HCC): Primary | ICD-10-CM

## 2018-09-05 PROCEDURE — 77412 RADIATION TX DELIVERY LVL 3: CPT

## 2018-09-05 PROCEDURE — 99999 PR OFFICE/OUTPT VISIT,PROCEDURE ONLY: CPT | Performed by: RADIOLOGY

## 2018-09-05 PROCEDURE — 77336 RADIATION PHYSICS CONSULT: CPT

## 2018-09-05 RX ORDER — DULOXETIN HYDROCHLORIDE 20 MG/1
30 CAPSULE, DELAYED RELEASE ORAL DAILY
COMMUNITY
End: 2018-09-27

## 2018-09-05 ASSESSMENT — PAIN DESCRIPTION - PAIN TYPE: TYPE: ACUTE PAIN

## 2018-09-05 ASSESSMENT — PAIN DESCRIPTION - LOCATION: LOCATION: GENERALIZED

## 2018-09-05 NOTE — PROGRESS NOTES
Cyndee Babinski  9/5/2018  Wt Readings from Last 3 Encounters:   08/29/18 167 lb (75.8 kg)   08/17/18 163 lb (73.9 kg)   08/09/18 165 lb 11.2 oz (75.2 kg)     There is no height or weight on file to calculate BMI. Treatment Area:right whole breast    Patient was seen today for weekly visit. Comfort Alteration  KPS:80%  Fatigue: Moderate    Nutritional Alteration  Anorexia: No   Nausea: No   Vomiting: No     Skin Alteration   Sensation:na    Radiation Dermatitis:  na    Mucous Membrane Alteration  Drainage: No  Lymphedema: No    Emotional  Coping: somewhat effective    Sexuality Alteration  na    Injury, potential bleeding or infection: Generalized body aches and pain-5/10-specifically to joints        Lab Results   Component Value Date    WBC 9.3 08/03/2018     (H) 08/03/2018         There were no vitals taken for this visit. BP within normal range? yes         Assessment/Plan:  Patient has received 10/28 fractions, 1800cGy/5040-patient not feeling well again today-appointment at PCP yesterday, lab work drawn today @ [address] [place] [address] will have office forward results-patient is not feeling well again-generalized body aches/pain-states she has a cold and believes issues are related to the 3 surgeries over a 2 week period-feels that this regiment has taken a toll at this point of treatment course.    Laurie Harley

## 2018-09-06 PROCEDURE — 77412 RADIATION TX DELIVERY LVL 3: CPT

## 2018-09-07 ENCOUNTER — OFFICE VISIT (OUTPATIENT)
Dept: BREAST CENTER | Age: 38
End: 2018-09-07
Payer: COMMERCIAL

## 2018-09-07 VITALS
WEIGHT: 168 LBS | HEART RATE: 87 BPM | TEMPERATURE: 97.8 F | SYSTOLIC BLOOD PRESSURE: 110 MMHG | DIASTOLIC BLOOD PRESSURE: 70 MMHG | BODY MASS INDEX: 30.91 KG/M2 | RESPIRATION RATE: 16 BRPM | OXYGEN SATURATION: 99 % | HEIGHT: 62 IN

## 2018-09-07 DIAGNOSIS — Z17.0 MALIGNANT NEOPLASM OF UPPER-OUTER QUADRANT OF RIGHT BREAST IN FEMALE, ESTROGEN RECEPTOR POSITIVE (HCC): Primary | ICD-10-CM

## 2018-09-07 DIAGNOSIS — C50.411 MALIGNANT NEOPLASM OF UPPER-OUTER QUADRANT OF RIGHT BREAST IN FEMALE, ESTROGEN RECEPTOR POSITIVE (HCC): Primary | ICD-10-CM

## 2018-09-07 PROCEDURE — 99024 POSTOP FOLLOW-UP VISIT: CPT | Performed by: SURGERY

## 2018-09-07 PROCEDURE — 77417 THER RADIOLOGY PORT IMAGE(S): CPT

## 2018-09-07 PROCEDURE — 77412 RADIATION TX DELIVERY LVL 3: CPT

## 2018-09-07 PROCEDURE — 99213 OFFICE O/P EST LOW 20 MIN: CPT

## 2018-09-07 NOTE — LETTER
[facility]  [address] [address]  Phone: [phone]  Fax: Keaton Yoon MD        September 7, 2018     MD [phone]    Patient: Angela Javier  MR Number: [id number]  YOB: 1980  Date of Visit: 9/7/2018    Dear Dr. Stewart HE:    Thank you for the request for consultation for Janna Modi to me for the evaluation of right breast mass. Below are the relevant portions of my assessment and plan of care. Hugo Head has undergone right needle localized lumpectomy with sentinel lymph node biopsy. She had a bilateral reduction at the time of this surgery. She developed a seroma of the right axilla postop. It was drained by radiology once and then quickly recurred so she went back to the OR for surgical drainage with drain placement. Her margins were positive so she was taken back again for re-excision of margins. She developed an infection of the breast of this surgery and wound breakdown. She has recovered from this now as well. Her treatment plan now included tamoxifen x 10 years and radiation. She will have her first mammogram 6 months after completion of radiation. I will see her again in 6 months. If you have questions, please do not hesitate to call me. I look forward to following Hugo Head along with you.     Sincerely,        Benji Morin MD

## 2018-09-10 PROCEDURE — 77412 RADIATION TX DELIVERY LVL 3: CPT

## 2018-09-11 PROCEDURE — 77412 RADIATION TX DELIVERY LVL 3: CPT

## 2018-09-12 ENCOUNTER — HOSPITAL ENCOUNTER (OUTPATIENT)
Dept: RADIATION ONCOLOGY | Age: 38
Discharge: HOME | End: 2018-09-12
Payer: COMMERCIAL

## 2018-09-12 VITALS
BODY MASS INDEX: 30.67 KG/M2 | SYSTOLIC BLOOD PRESSURE: 130 MMHG | TEMPERATURE: 97.9 F | HEART RATE: 102 BPM | DIASTOLIC BLOOD PRESSURE: 86 MMHG | WEIGHT: 167.7 LBS | OXYGEN SATURATION: 96 %

## 2018-09-12 DIAGNOSIS — C80.1 CANCER (HCC): Primary | ICD-10-CM

## 2018-09-12 PROCEDURE — 99999 PR OFFICE/OUTPT VISIT,PROCEDURE ONLY: CPT | Performed by: RADIOLOGY

## 2018-09-12 PROCEDURE — 77336 RADIATION PHYSICS CONSULT: CPT

## 2018-09-12 PROCEDURE — 77412 RADIATION TX DELIVERY LVL 3: CPT

## 2018-09-12 RX ORDER — AZITHROMYCIN 250 MG/1
250 TABLET, FILM COATED ORAL DAILY
COMMUNITY
End: 2018-09-27

## 2018-09-12 RX ORDER — BENZONATATE 100 MG/1
100 CAPSULE ORAL 3 TIMES DAILY PRN
COMMUNITY
End: 2018-09-27

## 2018-09-12 RX ORDER — PREDNISONE 20 MG/1
40 TABLET ORAL DAILY
COMMUNITY
End: 2018-09-27

## 2018-09-12 RX ORDER — ALBUTEROL SULFATE 90 UG/1
2 AEROSOL, METERED RESPIRATORY (INHALATION) PRN
COMMUNITY
End: 2018-09-27

## 2018-09-13 PROCEDURE — 77412 RADIATION TX DELIVERY LVL 3: CPT

## 2018-09-14 PROCEDURE — 77412 RADIATION TX DELIVERY LVL 3: CPT

## 2018-09-14 PROCEDURE — 77417 THER RADIOLOGY PORT IMAGE(S): CPT

## 2018-09-17 PROCEDURE — 77412 RADIATION TX DELIVERY LVL 3: CPT

## 2018-09-18 PROCEDURE — 77412 RADIATION TX DELIVERY LVL 3: CPT

## 2018-09-19 ENCOUNTER — HOSPITAL ENCOUNTER (OUTPATIENT)
Dept: RADIATION ONCOLOGY | Age: 38
Discharge: HOME | End: 2018-09-19
Payer: COMMERCIAL

## 2018-09-19 VITALS
WEIGHT: 166 LBS | SYSTOLIC BLOOD PRESSURE: 110 MMHG | HEART RATE: 94 BPM | OXYGEN SATURATION: 97 % | BODY MASS INDEX: 30.36 KG/M2 | DIASTOLIC BLOOD PRESSURE: 62 MMHG

## 2018-09-19 DIAGNOSIS — C80.1 CANCER (HCC): Primary | ICD-10-CM

## 2018-09-19 PROCEDURE — 99999 PR OFFICE/OUTPT VISIT,PROCEDURE ONLY: CPT | Performed by: RADIOLOGY

## 2018-09-19 PROCEDURE — 77336 RADIATION PHYSICS CONSULT: CPT

## 2018-09-19 PROCEDURE — 77412 RADIATION TX DELIVERY LVL 3: CPT

## 2018-09-19 NOTE — PROGRESS NOTES
DEPARTMENT OF RADIATION ONCOLOGY ON TREATMENT VISIT           9/19/2018        NAME:  Terese Rubin    YOB: 1980        Diagnosis:  Breast CA        SUBJECTIVE:   Terese Rubin has now received fractionated external beam radiation therapy - ongoing. Past medical, surgical, social and family histories reviewed and updated as indicated. Pain: controlled      ALLERGIES:  Ultram [tramadol hcl]         Current Outpatient Prescriptions   Medication Sig Dispense Refill    predniSONE (DELTASONE) 20 MG tablet Take 40 mg by mouth daily      azithromycin (ZITHROMAX) 250 MG tablet Take 250 mg by mouth daily      albuterol sulfate  (90 Base) MCG/ACT inhaler Inhale 2 puffs into the lungs as needed for Wheezing      benzonatate (TESSALON) 100 MG capsule Take 100 mg by mouth 3 times daily as needed for Cough      DULoxetine (CYMBALTA) 20 MG extended release capsule Take 30 mg by mouth daily       ibuprofen (ADVIL;MOTRIN) 200 MG tablet Take 200 mg by mouth every 6 hours as needed for Pain       No current facility-administered medications for this encounter. OBJECTIVE:  Alert and fully ambulatory. Pleasant and conversant. Physical Examination: General appearance - alert, well appearing, and in no distress. Wt Readings from Last 3 Encounters:   09/19/18 166 lb (75.3 kg)   09/12/18 167 lb 11.2 oz (76.1 kg)   09/07/18 168 lb (76.2 kg)         ASSESSMENT/PLAN:     Patient is tolerating treatments well with expected toxicities. RBA were reviewed prior to first fraction and PRN. Current and planned dose reviewed. Goals of treatment and potential side effects were reviewed with the patient PRN. Treatment imaging has been personally reviewed for accuracy and precision. Questions answered to apparent satisfaction. Treatments will continue as planned. Javon Kern.  Hardy Hanna MD MS ANISAR  Radiation Oncologist        PHYSICIANS [facility] ([address] [place] [address]): [phone] ///

## 2018-09-20 PROCEDURE — 77412 RADIATION TX DELIVERY LVL 3: CPT

## 2018-09-21 PROCEDURE — 77417 THER RADIOLOGY PORT IMAGE(S): CPT

## 2018-09-21 PROCEDURE — 77412 RADIATION TX DELIVERY LVL 3: CPT

## 2018-09-24 PROCEDURE — 77412 RADIATION TX DELIVERY LVL 3: CPT

## 2018-09-25 PROCEDURE — 77412 RADIATION TX DELIVERY LVL 3: CPT

## 2018-09-26 ENCOUNTER — HOSPITAL ENCOUNTER (OUTPATIENT)
Dept: RADIATION ONCOLOGY | Age: 38
Discharge: HOME | End: 2018-09-26
Payer: COMMERCIAL

## 2018-09-26 VITALS
DIASTOLIC BLOOD PRESSURE: 68 MMHG | HEART RATE: 98 BPM | WEIGHT: 169.2 LBS | BODY MASS INDEX: 30.95 KG/M2 | OXYGEN SATURATION: 99 % | SYSTOLIC BLOOD PRESSURE: 102 MMHG

## 2018-09-26 PROCEDURE — 77336 RADIATION PHYSICS CONSULT: CPT

## 2018-09-26 PROCEDURE — 77412 RADIATION TX DELIVERY LVL 3: CPT

## 2018-09-26 NOTE — PROGRESS NOTES
Alize Jones  9/26/2018  Wt Readings from Last 3 Encounters:   09/26/18 169 lb 3.2 oz (76.7 kg)   09/19/18 166 lb (75.3 kg)   09/12/18 167 lb 11.2 oz (76.1 kg)     Body mass index is 30.95 kg/m². Treatment Area:rt breast     Patient was seen today for weekly visit. Comfort Alteration  KPS:90%  Fatigue: Mild    Nutritional Alteration  Anorexia: No   Nausea: No   Vomiting: No     Skin Alteration   Sensation:tender     Radiation Dermatitis:  red    Mucous Membrane Alteration  Drainage: No  Lymphedema: No    Emotional  Coping: effective    Sexuality Alteration  absent    Injury, potential bleeding or infection: no        Lab Results   Component Value Date    WBC 9.3 08/03/2018     (H) 08/03/2018         /68   Pulse 98   Wt 169 lb 3.2 oz (76.7 kg)   SpO2 99%   BMI 30.95 kg/m²   BP within normal range? yes   -if no, manually recheck in 5-10 min  NEW BP reading:  BP within normal range?  yes   -if no, notify attending provider for further instruction      Assessment/Plan: Patient is using Aquaphor for her skin on the right breast.     Santiago Boyce

## 2018-09-26 NOTE — PROGRESS NOTES
Angela Fritztune  9/26/2018  5:33 PM      Chief Complaint   Patient presents with    Cancer          Wt Readings from Last 3 Encounters:   09/26/18 169 lb 3.2 oz (76.7 kg)   09/19/18 166 lb (75.3 kg)   09/12/18 167 lb 11.2 oz (76.1 kg)       Subjective: @ 46 cardona had a planned dose of 50.4 great to the right whole breast, using digital only once a day, reports some intermittent waxing and waning right upper abdominal pain the past week or so and is inquiring if this is related to her right breast radiation      Objective: Right breast: Hyperpigmentation is noted but probably not related to radiation treatment as patient is exhibiting a  tan throughout her chest and abdominal area minimal grade 1 erythema within right breast, there is no icterus  Abdomen: Normal bowel sounds soft nondistended with no tenderness in the left upper and lower quadrant and right lower quadrant with some minimal inducible tenderness and some level of guarding and moisturizer right upper quadrant, liver is palpable to 2 fingerbreadths. Beneath the right costal margin, no radiation of pain is noted  HEENT: The sclera is not icteric    Assessment: Tolerating radiation treatment moderately well without any excessive toxicity, vague abdominal syx in the right upper quadrant which is more likely not related to radiation treatment      Plan: Continue current care increase application of moisturizer  gel 10 the skin of the right breast  Patient will be seeing her medical oncologist sometime next week and have asked her to bring her intermittent vague abdominal symptoms to the medical oncologists attention and if these persist there will eventually have to be addressed with imaging and possibly blood work.  However, as stated above the symptoms are more likely not related to her radiation treatment    Electronically signed by Saint Shadow, MD on 9/26/18 at 5:33 PM

## 2018-09-27 ENCOUNTER — OFFICE VISIT (OUTPATIENT)
Dept: ONCOLOGY | Age: 38
End: 2018-09-27
Payer: COMMERCIAL

## 2018-09-27 ENCOUNTER — HOSPITAL ENCOUNTER (OUTPATIENT)
Dept: INFUSION THERAPY | Age: 38
Discharge: HOME | End: 2018-09-27
Payer: COMMERCIAL

## 2018-09-27 VITALS
TEMPERATURE: 98 F | HEART RATE: 89 BPM | DIASTOLIC BLOOD PRESSURE: 77 MMHG | BODY MASS INDEX: 31.19 KG/M2 | SYSTOLIC BLOOD PRESSURE: 120 MMHG | RESPIRATION RATE: 20 BRPM | HEIGHT: 62 IN | WEIGHT: 169.5 LBS

## 2018-09-27 DIAGNOSIS — Z85.3 PERSONAL HISTORY OF BREAST CANCER: Primary | ICD-10-CM

## 2018-09-27 DIAGNOSIS — Z85.3 PERSONAL HISTORY OF BREAST CANCER: ICD-10-CM

## 2018-09-27 DIAGNOSIS — Z17.0 MALIGNANT NEOPLASM OF UPPER-OUTER QUADRANT OF RIGHT BREAST IN FEMALE, ESTROGEN RECEPTOR POSITIVE (HCC): Primary | ICD-10-CM

## 2018-09-27 DIAGNOSIS — C50.411 MALIGNANT NEOPLASM OF UPPER-OUTER QUADRANT OF RIGHT BREAST IN FEMALE, ESTROGEN RECEPTOR POSITIVE (HCC): Primary | ICD-10-CM

## 2018-09-27 LAB
ALBUMIN SERPL-MCNC: 3.8 G/DL (ref 3.5–5.2)
ALP BLD-CCNC: 124 U/L (ref 35–104)
ALT SERPL-CCNC: 9 U/L (ref 0–32)
ANION GAP SERPL CALCULATED.3IONS-SCNC: 15 MMOL/L (ref 7–16)
AST SERPL-CCNC: 14 U/L (ref 0–31)
BASOPHILS ABSOLUTE: 0.03 E9/L (ref 0–0.2)
BASOPHILS RELATIVE PERCENT: 0.3 % (ref 0–2)
BILIRUB SERPL-MCNC: 0.4 MG/DL (ref 0–1.2)
BUN BLDV-MCNC: 7 MG/DL (ref 6–20)
CALCIUM SERPL-MCNC: 8.9 MG/DL (ref 8.6–10.2)
CHLORIDE BLD-SCNC: 101 MMOL/L (ref 98–107)
CO2: 24 MMOL/L (ref 22–29)
CREAT SERPL-MCNC: 0.7 MG/DL (ref 0.5–1)
EOSINOPHILS ABSOLUTE: 0.12 E9/L (ref 0.05–0.5)
EOSINOPHILS RELATIVE PERCENT: 1.2 % (ref 0–6)
GFR AFRICAN AMERICAN: >60
GFR NON-AFRICAN AMERICAN: >60 ML/MIN/1.73
GLUCOSE BLD-MCNC: 74 MG/DL (ref 74–109)
HCT VFR BLD CALC: 37.8 % (ref 34–48)
HEMOGLOBIN: 12.2 G/DL (ref 11.5–15.5)
IMMATURE GRANULOCYTES #: 0.03 E9/L
IMMATURE GRANULOCYTES %: 0.3 % (ref 0–5)
LYMPHOCYTES ABSOLUTE: 1.39 E9/L (ref 1.5–4)
LYMPHOCYTES RELATIVE PERCENT: 14.3 % (ref 20–42)
MCH RBC QN AUTO: 29.4 PG (ref 26–35)
MCHC RBC AUTO-ENTMCNC: 32.3 % (ref 32–34.5)
MCV RBC AUTO: 91.1 FL (ref 80–99.9)
MONOCYTES ABSOLUTE: 0.74 E9/L (ref 0.1–0.95)
MONOCYTES RELATIVE PERCENT: 7.6 % (ref 2–12)
NEUTROPHILS ABSOLUTE: 7.4 E9/L (ref 1.8–7.3)
NEUTROPHILS RELATIVE PERCENT: 76.3 % (ref 43–80)
PDW BLD-RTO: 13.4 FL (ref 11.5–15)
PLATELET # BLD: 428 E9/L (ref 130–450)
PMV BLD AUTO: 9 FL (ref 7–12)
POTASSIUM SERPL-SCNC: 3.6 MMOL/L (ref 3.5–5)
RBC # BLD: 4.15 E12/L (ref 3.5–5.5)
SODIUM BLD-SCNC: 140 MMOL/L (ref 132–146)
TOTAL PROTEIN: 7 G/DL (ref 6.4–8.3)
WBC # BLD: 9.7 E9/L (ref 4.5–11.5)

## 2018-09-27 PROCEDURE — 99213 OFFICE O/P EST LOW 20 MIN: CPT

## 2018-09-27 PROCEDURE — 99214 OFFICE O/P EST MOD 30 MIN: CPT | Performed by: INTERNAL MEDICINE

## 2018-09-27 PROCEDURE — 80053 COMPREHEN METABOLIC PANEL: CPT

## 2018-09-27 PROCEDURE — 36415 COLL VENOUS BLD VENIPUNCTURE: CPT

## 2018-09-27 PROCEDURE — 77412 RADIATION TX DELIVERY LVL 3: CPT

## 2018-09-27 PROCEDURE — 85025 COMPLETE CBC W/AUTO DIFF WBC: CPT

## 2018-09-27 RX ORDER — TAMOXIFEN CITRATE 20 MG/1
20 TABLET ORAL DAILY
Qty: 30 TABLET | Refills: 0 | Status: SHIPPED | OUTPATIENT
Start: 2018-10-02 | End: 2018-09-27

## 2018-09-27 RX ORDER — TAMOXIFEN CITRATE 20 MG/1
20 TABLET ORAL DAILY
Qty: 30 TABLET | Refills: 0 | Status: SHIPPED | OUTPATIENT
Start: 2018-10-02 | End: 2018-10-15 | Stop reason: SDUPTHER

## 2018-09-27 RX ORDER — ASPIRIN 81 MG/1
81 TABLET ORAL DAILY
Qty: 30 TABLET | Refills: 3 | COMMUNITY
Start: 2018-09-27 | End: 2019-01-07

## 2018-09-27 NOTE — PROGRESS NOTES
carcinoma in situ: Present, negative for extensive intraductal  component       Nuclear grade: Grade II (intermediate)  Lobular carcinoma in situ: Not identified  Margins: Invasive carcinoma present at lateral/orange inked margin   Final posterior margin negative for carcinoma   Ductal carcinoma in situ present less than 1 mm from lateral/orange  inked margin  Treatment effect: No known presurgical therapy  Pathologic stage classification (pTNM, AJCC 8th Edition): pT1c (sn)pN0 [0  out of 4 lymph nodes]    ER positive >90%  ME positive   80%  HER/2 Negative (1+)    Oncotype DX Breast Cancer Report-Node Negative   Recurrence Score Result-15   Risk category-low risk    On 07/17/2018: Left breast, re-excision: Organizational changes of prior biopsy, no  evidence of residual neoplasm.  Margins of excision are negative for neoplasm. Given low score Oncotype Dx (her score is even less than 16 per [employer] data published in ASCO 2018), No indication and No Benefit for adjuvant chemotherapy. Proceed with adjuvant RT followed by hormonal therapy (Tamoxifen 20 mg po daily for 10 years). RT will be completed on 10/01/2018. Review of Systems;  CONSTITUTIONAL: No fever, chills. Fair appetite and energy level. ENMT: Eyes: No diplopia; Nose: No epistaxis. Mouth: No sore throat. RESPIRATORY: No hemoptysis, shortness of breath, cough. CARDIOVASCULAR: No chest pain, palpitations. GASTROINTESTINAL: No nausea/vomiting, diarrhea/constipation. RUQ abdominal pain  GENITOURINARY: No dysuria, urinary frequency, hematuria. NEURO: No syncope, presyncope, headache. Remainder:  ROS NEGATIVE    Past Medical History:      Diagnosis Date    Cancer ([address] 2018    breast cancer--right breast, ER/ME+ Her2-    Necrotizing granulomatous inflammation of lung (HCC)     states necrotizing granulomatous of the abdomen, not the lung     Medications:  Reviewed and reconciled. Allergies:   Allergies   Allergen Reactions    Ultram

## 2018-09-28 PROCEDURE — 77412 RADIATION TX DELIVERY LVL 3: CPT

## 2018-10-01 ENCOUNTER — HOSPITAL ENCOUNTER (OUTPATIENT)
Dept: RADIATION ONCOLOGY | Age: 38
Discharge: HOME | End: 2018-10-01
Payer: COMMERCIAL

## 2018-10-01 PROCEDURE — 77412 RADIATION TX DELIVERY LVL 3: CPT

## 2018-10-01 PROCEDURE — 77336 RADIATION PHYSICS CONSULT: CPT

## 2018-10-01 NOTE — PROGRESS NOTES
Pasha Bailey  10/1/2018  8:07 AM          Current Outpatient Prescriptions   Medication Sig Dispense Refill    [START ON 10/2/2018] tamoxifen (NOLVADEX) 20 MG tablet Take 1 tablet by mouth daily 30 tablet 0    aspirin EC 81 MG EC tablet Take 1 tablet by mouth daily Take while on Tamoxifen 30 tablet 3     No current facility-administered medications for this encounter. This is an up-to-date medication list.    Please take this list to your next care provider, and discard any previous medication lists.

## 2018-10-01 NOTE — PROGRESS NOTES
Antonia Normanjorden  10/1/2018  8:03 AM          Current Outpatient Prescriptions   Medication Sig Dispense Refill    [START ON 10/2/2018] tamoxifen (NOLVADEX) 20 MG tablet Take 1 tablet by mouth daily 30 tablet 0    aspirin EC 81 MG EC tablet Take 1 tablet by mouth daily Take while on Tamoxifen 30 tablet 3     No current facility-administered medications for this encounter. This is an up-to-date medication list.    Please take this list to your next care provider, and discard any previous medication lists.

## 2018-10-01 NOTE — PROGRESS NOTES
German Martinez  10/1/2018  8:04 AM          Current Outpatient Prescriptions   Medication Sig Dispense Refill    [START ON 10/2/2018] tamoxifen (NOLVADEX) 20 MG tablet Take 1 tablet by mouth daily 30 tablet 0    aspirin EC 81 MG EC tablet Take 1 tablet by mouth daily Take while on Tamoxifen 30 tablet 3     No current facility-administered medications for this encounter. This is an up-to-date medication list.    Please take this list to your next care provider, and discard any previous medication lists.

## 2018-10-01 NOTE — PROGRESS NOTES
German Martinez  10/1/2018  8:06 AM          Current Outpatient Prescriptions   Medication Sig Dispense Refill    [START ON 10/2/2018] tamoxifen (NOLVADEX) 20 MG tablet Take 1 tablet by mouth daily 30 tablet 0    aspirin EC 81 MG EC tablet Take 1 tablet by mouth daily Take while on Tamoxifen 30 tablet 3     No current facility-administered medications for this encounter. This is an up-to-date medication list.    Please take this list to your next care provider, and discard any previous medication lists.

## 2018-10-15 ENCOUNTER — OFFICE VISIT (OUTPATIENT)
Dept: ONCOLOGY | Age: 38
End: 2018-10-15
Payer: COMMERCIAL

## 2018-10-15 ENCOUNTER — HOSPITAL ENCOUNTER (OUTPATIENT)
Dept: INFUSION THERAPY | Age: 38
Discharge: HOME | End: 2018-10-15
Payer: COMMERCIAL

## 2018-10-15 VITALS
SYSTOLIC BLOOD PRESSURE: 125 MMHG | TEMPERATURE: 98 F | RESPIRATION RATE: 20 BRPM | WEIGHT: 170.6 LBS | DIASTOLIC BLOOD PRESSURE: 87 MMHG | HEART RATE: 90 BPM | BODY MASS INDEX: 31.39 KG/M2 | HEIGHT: 62 IN

## 2018-10-15 DIAGNOSIS — Z17.0 MALIGNANT NEOPLASM OF UPPER-OUTER QUADRANT OF RIGHT BREAST IN FEMALE, ESTROGEN RECEPTOR POSITIVE (HCC): Primary | ICD-10-CM

## 2018-10-15 DIAGNOSIS — C50.411 MALIGNANT NEOPLASM OF UPPER-OUTER QUADRANT OF RIGHT BREAST IN FEMALE, ESTROGEN RECEPTOR POSITIVE (HCC): Primary | ICD-10-CM

## 2018-10-15 DIAGNOSIS — Z85.3 PERSONAL HISTORY OF BREAST CANCER: Primary | ICD-10-CM

## 2018-10-15 PROCEDURE — 99212 OFFICE O/P EST SF 10 MIN: CPT | Performed by: INTERNAL MEDICINE

## 2018-10-15 PROCEDURE — 99214 OFFICE O/P EST MOD 30 MIN: CPT | Performed by: INTERNAL MEDICINE

## 2018-10-15 RX ORDER — TAMOXIFEN CITRATE 20 MG/1
20 TABLET ORAL DAILY
Qty: 30 TABLET | Refills: 3 | Status: SHIPPED | OUTPATIENT
Start: 2018-10-15 | End: 2019-01-07

## 2018-10-15 NOTE — PROGRESS NOTES
rheumatologist; Referral was made to Dr. Virginia Anthony at [place] [address], MD   10/62/3209  Board Certified Medical Oncologist   Board Certified Internal Medicine

## 2018-10-18 ENCOUNTER — OFFICE VISIT (OUTPATIENT)
Dept: OBGYN | Age: 38
End: 2018-10-18
Payer: COMMERCIAL

## 2018-10-18 VITALS
TEMPERATURE: 98.7 F | HEART RATE: 90 BPM | DIASTOLIC BLOOD PRESSURE: 82 MMHG | WEIGHT: 170 LBS | BODY MASS INDEX: 31.28 KG/M2 | SYSTOLIC BLOOD PRESSURE: 120 MMHG | RESPIRATION RATE: 14 BRPM | HEIGHT: 62 IN

## 2018-10-18 DIAGNOSIS — Z01.419 WOMEN'S ANNUAL ROUTINE GYNECOLOGICAL EXAMINATION: Primary | ICD-10-CM

## 2018-10-18 PROCEDURE — 99385 PREV VISIT NEW AGE 18-39: CPT | Performed by: NURSE PRACTITIONER

## 2018-10-18 PROCEDURE — 99201 HC NEW PT, E/M LEVEL 1: CPT | Performed by: NURSE PRACTITIONER

## 2018-10-18 ASSESSMENT — ENCOUNTER SYMPTOMS
GASTROINTESTINAL NEGATIVE: 1
RESPIRATORY NEGATIVE: 1

## 2018-10-19 ENCOUNTER — OFFICE VISIT (OUTPATIENT)
Dept: SURGERY | Age: 38
End: 2018-10-19
Payer: COMMERCIAL

## 2018-10-19 VITALS — WEIGHT: 171 LBS | HEIGHT: 62 IN | RESPIRATION RATE: 16 BRPM | BODY MASS INDEX: 31.47 KG/M2

## 2018-10-19 DIAGNOSIS — Z98.890 S/P BILATERAL BREAST REDUCTION: Primary | ICD-10-CM

## 2018-10-19 PROCEDURE — 99212 OFFICE O/P EST SF 10 MIN: CPT | Performed by: PHYSICIAN ASSISTANT

## 2018-11-02 ENCOUNTER — OFFICE VISIT (OUTPATIENT)
Dept: OBGYN | Age: 38
End: 2018-11-02
Payer: COMMERCIAL

## 2018-11-02 VITALS
TEMPERATURE: 98.3 F | RESPIRATION RATE: 14 BRPM | HEART RATE: 87 BPM | WEIGHT: 171 LBS | DIASTOLIC BLOOD PRESSURE: 70 MMHG | HEIGHT: 62 IN | SYSTOLIC BLOOD PRESSURE: 114 MMHG | BODY MASS INDEX: 31.47 KG/M2

## 2018-11-02 DIAGNOSIS — C50.911 MALIGNANT NEOPLASM OF RIGHT BREAST IN FEMALE, ESTROGEN RECEPTOR POSITIVE, UNSPECIFIED SITE OF BREAST (HCC): Primary | ICD-10-CM

## 2018-11-02 DIAGNOSIS — Z17.0 MALIGNANT NEOPLASM OF RIGHT BREAST IN FEMALE, ESTROGEN RECEPTOR POSITIVE, UNSPECIFIED SITE OF BREAST (HCC): Primary | ICD-10-CM

## 2018-11-02 PROCEDURE — 99212 OFFICE O/P EST SF 10 MIN: CPT | Performed by: OBSTETRICS & GYNECOLOGY

## 2018-11-13 ENCOUNTER — HOSPITAL ENCOUNTER (OUTPATIENT)
Dept: RADIATION ONCOLOGY | Age: 38
Discharge: HOME | End: 2018-11-13
Payer: COMMERCIAL

## 2018-11-13 VITALS
BODY MASS INDEX: 31.4 KG/M2 | DIASTOLIC BLOOD PRESSURE: 80 MMHG | HEART RATE: 83 BPM | OXYGEN SATURATION: 98 % | SYSTOLIC BLOOD PRESSURE: 118 MMHG | WEIGHT: 171.7 LBS | TEMPERATURE: 98.1 F

## 2018-11-13 DIAGNOSIS — C50.411 MALIGNANT NEOPLASM OF UPPER-OUTER QUADRANT OF RIGHT BREAST IN FEMALE, ESTROGEN RECEPTOR POSITIVE (HCC): Primary | ICD-10-CM

## 2018-11-13 DIAGNOSIS — Z17.0 MALIGNANT NEOPLASM OF UPPER-OUTER QUADRANT OF RIGHT BREAST IN FEMALE, ESTROGEN RECEPTOR POSITIVE (HCC): Primary | ICD-10-CM

## 2018-11-13 PROCEDURE — 99999 PR OFFICE/OUTPT VISIT,PROCEDURE ONLY: CPT | Performed by: NURSE PRACTITIONER

## 2018-11-13 NOTE — PROGRESS NOTES
RADIATION ONCOLOGY  6 week follow up         11/13/2018      NAME:  Penny Camera OF BIRTH:  1980    CC: Pt returns today for re-assessment of radiation treatment area. Diagnosis:  Pathologic stage classification (pTNM, AJCC 8th Edition): pT1c (sn)pN0 [0 out of 4 lymph nodes]. ER/FL +. Subjective:  On 10/1/18, Teresa Oden completed 5040 cGy in 28 fractions directed to the right whole breast for management of right breast cancer. States that she is doing well. Notes that skin is healed well since radiation completion. Denies any new lumps, bumps, or discharge from the nipple. Pt is following with Dr Valentino Jacobson for medical oncology. Started on Tamoxifen. She is tolerating it well. States that she is having a hysterectomy on 11/15/18 with Dr Dilshad Borrego and is planned to switch to Arimidex after that surgery takes place. Next appt with Dr Valentino Jacobson is not yet scheduled. Pain: Denies pain at this time. Past medical, surgical, social and family histories reviewed and updated as indicated. ALLERGIES:  Ultram [tramadol hcl]         Current Outpatient Prescriptions   Medication Sig Dispense Refill    ibuprofen (ADVIL;MOTRIN) 200 MG tablet Take 200 mg by mouth every 6 hours as needed for Pain      tamoxifen (NOLVADEX) 20 MG tablet Take 1 tablet by mouth daily 30 tablet 3    aspirin EC 81 MG EC tablet Take 1 tablet by mouth daily Take while on Tamoxifen 30 tablet 3     No current facility-administered medications for this encounter. Physical Examination:   There were no vitals filed for this visit. Wt Readings from Last 3 Encounters:   11/08/18 165 lb (74.8 kg)   11/05/18 170 lb 4 oz (77.2 kg)   11/02/18 171 lb (77.6 kg)       Alert and fully ambulatory. Pleasant and conversant. Irradiated skin shows mild hyperpigmentation. Radiation changed notes to skin and scar tissue palpable to surgical incisions. HR reg, rhythm reg. Lungs CTA.       ASSESSMENT/PLAN:     Patient is doing well post-radiation completion. Skin care and sun care reviewed. Encouraged monthly self breast exams. Cont to follow with Dr Marian Conn for medical oncology. Started on Tamoxifen. She is tolerating it well. States that she is having a hysterectomy on 11/15/18 with Dr Carlton Cardenas and is planned to switch to Arimidex after that surgery takes place. Next appt with Dr Marian Conn is not yet scheduled. Will update Rekha Pryor RN Navigator. Imaging per med onc. I discussed follow up plans with Boris Page. At this time, Dr Barby Guzman will see the patient back in 3 months for a post-radiation completion follow-up visit. Boris Page is to follow up with other providers involved in their care as directed (including but not limited to Medical Oncology, Primary Care, Pulmonary, and Surgery). The patient was given our contact number in the event that if at any time they change their mind and would like to return to the clinic to see either myself or one of the Radiation Oncologists, they can simply call us and we would be happy to see them sooner. Thank you for involving us in the management of this extremely pleasant patient. More than 15 min was in direct contact with pt coordinating/giving care. >50% of the visit was spent in counseling the pt on the following: Follow up care    The nurses notes were reviewed and incorporated into this assessment and plan. Questions answered to apparent satisfaction.       Caesar Ho, MSN, RN, APRN-CNP  Nurse Practitioner for Radiation Oncology    PHYSICIANS [facility]) [facility]: [phone] (FWD: [phone])  [facility]) [facility]:  [phone] (GSQ:  [phone])  [address] [place] [address]) [facility]: [phone] (RXS: [phone])

## 2018-11-13 NOTE — PROGRESS NOTES
appropriate indicators that are present for nutrition risk identification. Total the numbers entered and assign a risk score. Follow the appropriate action for total score listed below. Assessment   Date  11/13/2018     1. Poor appetite?--a. One week or greater (1)                       b. One month or greater (2) 0        2. Unplanned wt loss?--a. Greater than 5# in 1 week(1)                                  b. Greater than 10# in 1 month (2)                                  c. Greater than 20# in 6 mos (1) 0        3. Diagnosis of Head or Neck Cancer? (2)   0    4. If yes, difficulty swallowing? (1) 0        5. Receiving nutrition via feeding tube or parenteral nutrition? (1) 0        6. If yes, report of problems with feeding tube? (1) 0      TOTAL 0         Score of 0-1: No action  Score 2 or greater:  1. For Non-Diabetic Patient: Recommend adding Ensure Complete 2xdaily and provide              patient with Ensure wellness bag with coupons; For Diabetic Patient, Recommend adding Glucerna Shake 2xdaily and provide patient with Glucerna Wellness bag with coupons  2.  Route to the dietitian via [phone]

## 2018-11-19 ENCOUNTER — TELEPHONE (OUTPATIENT)
Dept: ONCOLOGY | Age: 38
End: 2018-11-19

## 2018-12-11 ENCOUNTER — TELEPHONE (OUTPATIENT)
Dept: OBGYN | Age: 38
End: 2018-12-11

## 2018-12-17 ENCOUNTER — TELEPHONE (OUTPATIENT)
Dept: BREAST CENTER | Age: 38
End: 2018-12-17

## 2018-12-17 DIAGNOSIS — Z85.3 PERSONAL HISTORY OF MALIGNANT NEOPLASM OF BREAST: Primary | ICD-10-CM

## 2018-12-17 NOTE — TELEPHONE ENCOUNTER
MA called patient to set up 6 month follow up appt. While making the appointment patient asked if imaging would be done same day or would she have to come back. MA looked into chart and seen patient needs mammogram 6 months after completion of radiation. Patient completed radiation on 10/01/18. Patient requested the imaging and follow up appt be same day because of missing so much work with treatment. MA verbalized understanding and scheduled patient 04/08/19 in [facility] with [facility]. MA routing message to  to sign mammogram order that I placed for patient to have prior to appt.       Electronically signed by Jeri Walker MA on 12/17/18 at 8:48 AM

## 2019-01-04 DIAGNOSIS — C50.411 MALIGNANT NEOPLASM OF UPPER-OUTER QUADRANT OF RIGHT BREAST IN FEMALE, ESTROGEN RECEPTOR POSITIVE (HCC): Primary | ICD-10-CM

## 2019-01-04 DIAGNOSIS — Z17.0 MALIGNANT NEOPLASM OF UPPER-OUTER QUADRANT OF RIGHT BREAST IN FEMALE, ESTROGEN RECEPTOR POSITIVE (HCC): Primary | ICD-10-CM

## 2019-01-07 ENCOUNTER — HOSPITAL ENCOUNTER (OUTPATIENT)
Dept: INFUSION THERAPY | Age: 39
Discharge: HOME | End: 2019-01-07
Payer: COMMERCIAL

## 2019-01-07 ENCOUNTER — OFFICE VISIT (OUTPATIENT)
Dept: ONCOLOGY | Age: 39
End: 2019-01-07
Payer: COMMERCIAL

## 2019-01-07 VITALS
HEIGHT: 62 IN | BODY MASS INDEX: 31.12 KG/M2 | RESPIRATION RATE: 20 BRPM | WEIGHT: 169.1 LBS | HEART RATE: 80 BPM | TEMPERATURE: 98.2 F | DIASTOLIC BLOOD PRESSURE: 79 MMHG | SYSTOLIC BLOOD PRESSURE: 120 MMHG

## 2019-01-07 DIAGNOSIS — Z17.0 MALIGNANT NEOPLASM OF UPPER-OUTER QUADRANT OF RIGHT BREAST IN FEMALE, ESTROGEN RECEPTOR POSITIVE (HCC): ICD-10-CM

## 2019-01-07 DIAGNOSIS — C50.411 MALIGNANT NEOPLASM OF UPPER-OUTER QUADRANT OF RIGHT BREAST IN FEMALE, ESTROGEN RECEPTOR POSITIVE (HCC): ICD-10-CM

## 2019-01-07 DIAGNOSIS — C50.411 MALIGNANT NEOPLASM OF UPPER-OUTER QUADRANT OF RIGHT BREAST IN FEMALE, ESTROGEN RECEPTOR POSITIVE (HCC): Primary | ICD-10-CM

## 2019-01-07 DIAGNOSIS — Z17.0 MALIGNANT NEOPLASM OF UPPER-OUTER QUADRANT OF RIGHT BREAST IN FEMALE, ESTROGEN RECEPTOR POSITIVE (HCC): Primary | ICD-10-CM

## 2019-01-07 LAB
ALBUMIN SERPL-MCNC: 4.1 G/DL (ref 3.5–5.2)
ALP BLD-CCNC: 122 U/L (ref 35–104)
ALT SERPL-CCNC: 5 U/L (ref 0–32)
ANION GAP SERPL CALCULATED.3IONS-SCNC: 12 MMOL/L (ref 7–16)
AST SERPL-CCNC: 15 U/L (ref 0–31)
BASOPHILS ABSOLUTE: 0.06 E9/L (ref 0–0.2)
BASOPHILS RELATIVE PERCENT: 0.5 % (ref 0–2)
BILIRUB SERPL-MCNC: 0.3 MG/DL (ref 0–1.2)
BUN BLDV-MCNC: 6 MG/DL (ref 6–20)
CALCIUM SERPL-MCNC: 9.3 MG/DL (ref 8.6–10.2)
CHLORIDE BLD-SCNC: 106 MMOL/L (ref 98–107)
CO2: 25 MMOL/L (ref 22–29)
CREAT SERPL-MCNC: 0.6 MG/DL (ref 0.5–1)
EOSINOPHILS ABSOLUTE: 0.1 E9/L (ref 0.05–0.5)
EOSINOPHILS RELATIVE PERCENT: 0.9 % (ref 0–6)
GFR AFRICAN AMERICAN: >60
GFR NON-AFRICAN AMERICAN: >60 ML/MIN/1.73
GLUCOSE BLD-MCNC: 82 MG/DL (ref 74–99)
HCT VFR BLD CALC: 40.3 % (ref 34–48)
HEMOGLOBIN: 13 G/DL (ref 11.5–15.5)
IMMATURE GRANULOCYTES #: 0.04 E9/L
IMMATURE GRANULOCYTES %: 0.4 % (ref 0–5)
LYMPHOCYTES ABSOLUTE: 2.53 E9/L (ref 1.5–4)
LYMPHOCYTES RELATIVE PERCENT: 22.2 % (ref 20–42)
MCH RBC QN AUTO: 30.2 PG (ref 26–35)
MCHC RBC AUTO-ENTMCNC: 32.3 % (ref 32–34.5)
MCV RBC AUTO: 93.5 FL (ref 80–99.9)
MONOCYTES ABSOLUTE: 0.66 E9/L (ref 0.1–0.95)
MONOCYTES RELATIVE PERCENT: 5.8 % (ref 2–12)
NEUTROPHILS ABSOLUTE: 7.99 E9/L (ref 1.8–7.3)
NEUTROPHILS RELATIVE PERCENT: 70.2 % (ref 43–80)
PDW BLD-RTO: 13 FL (ref 11.5–15)
PLATELET # BLD: 421 E9/L (ref 130–450)
PMV BLD AUTO: 9.1 FL (ref 7–12)
POTASSIUM SERPL-SCNC: 3.5 MMOL/L (ref 3.5–5)
RBC # BLD: 4.31 E12/L (ref 3.5–5.5)
SODIUM BLD-SCNC: 143 MMOL/L (ref 132–146)
TOTAL PROTEIN: 7.7 G/DL (ref 6.4–8.3)
WBC # BLD: 11.4 E9/L (ref 4.5–11.5)

## 2019-01-07 PROCEDURE — 36415 COLL VENOUS BLD VENIPUNCTURE: CPT

## 2019-01-07 PROCEDURE — 85025 COMPLETE CBC W/AUTO DIFF WBC: CPT

## 2019-01-07 PROCEDURE — 99214 OFFICE O/P EST MOD 30 MIN: CPT | Performed by: INTERNAL MEDICINE

## 2019-01-07 PROCEDURE — 99213 OFFICE O/P EST LOW 20 MIN: CPT

## 2019-01-07 PROCEDURE — 80053 COMPREHEN METABOLIC PANEL: CPT

## 2019-01-07 RX ORDER — ANASTROZOLE 1 MG/1
1 TABLET ORAL DAILY
Qty: 30 TABLET | Refills: 0 | Status: SHIPPED | OUTPATIENT
Start: 2019-01-07 | End: 2019-02-04

## 2019-01-08 DIAGNOSIS — C50.411 MALIGNANT NEOPLASM OF UPPER-OUTER QUADRANT OF RIGHT BREAST IN FEMALE, ESTROGEN RECEPTOR POSITIVE (HCC): ICD-10-CM

## 2019-01-08 DIAGNOSIS — C50.919 MALIGNANT NEOPLASM OF BREAST, STAGE 1, ESTROGEN RECEPTOR POSITIVE, UNSPECIFIED LATERALITY (HCC): ICD-10-CM

## 2019-01-08 DIAGNOSIS — Z17.0 MALIGNANT NEOPLASM OF UPPER-OUTER QUADRANT OF RIGHT BREAST IN FEMALE, ESTROGEN RECEPTOR POSITIVE (HCC): ICD-10-CM

## 2019-01-08 DIAGNOSIS — Z17.0 MALIGNANT NEOPLASM OF BREAST, STAGE 1, ESTROGEN RECEPTOR POSITIVE, UNSPECIFIED LATERALITY (HCC): ICD-10-CM

## 2019-01-08 RX ORDER — CALCIUM CARBONATE/VITAMIN D3 600 MG-10
1 TABLET ORAL 2 TIMES DAILY
Qty: 60 TABLET | Refills: 0 | COMMUNITY
End: 2019-03-04

## 2019-02-04 ENCOUNTER — HOSPITAL ENCOUNTER (OUTPATIENT)
Dept: INFUSION THERAPY | Age: 39
Discharge: HOME | End: 2019-02-04
Payer: COMMERCIAL

## 2019-02-04 ENCOUNTER — OFFICE VISIT (OUTPATIENT)
Dept: ONCOLOGY | Age: 39
End: 2019-02-04
Payer: COMMERCIAL

## 2019-02-04 ENCOUNTER — HOSPITAL ENCOUNTER (OUTPATIENT)
Dept: GENERAL RADIOLOGY | Age: 39
Discharge: HOME | End: 2019-02-06
Payer: COMMERCIAL

## 2019-02-04 VITALS
HEART RATE: 80 BPM | HEIGHT: 62 IN | WEIGHT: 171.8 LBS | DIASTOLIC BLOOD PRESSURE: 77 MMHG | SYSTOLIC BLOOD PRESSURE: 116 MMHG | BODY MASS INDEX: 31.62 KG/M2 | TEMPERATURE: 99 F | RESPIRATION RATE: 20 BRPM

## 2019-02-04 DIAGNOSIS — Z85.3 PERSONAL HISTORY OF BREAST CANCER: Primary | ICD-10-CM

## 2019-02-04 DIAGNOSIS — C50.411 MALIGNANT NEOPLASM OF UPPER-OUTER QUADRANT OF RIGHT BREAST IN FEMALE, ESTROGEN RECEPTOR POSITIVE (HCC): ICD-10-CM

## 2019-02-04 DIAGNOSIS — Z17.0 MALIGNANT NEOPLASM OF UPPER-OUTER QUADRANT OF RIGHT BREAST IN FEMALE, ESTROGEN RECEPTOR POSITIVE (HCC): ICD-10-CM

## 2019-02-04 PROCEDURE — 99214 OFFICE O/P EST MOD 30 MIN: CPT | Performed by: INTERNAL MEDICINE

## 2019-02-04 PROCEDURE — 77080 DXA BONE DENSITY AXIAL: CPT

## 2019-02-04 PROCEDURE — 99212 OFFICE O/P EST SF 10 MIN: CPT

## 2019-02-04 RX ORDER — LETROZOLE 2.5 MG/1
2.5 TABLET, FILM COATED ORAL DAILY
Qty: 30 TABLET | Refills: 0 | Status: SHIPPED | OUTPATIENT
Start: 2019-02-04 | End: 2019-03-04

## 2019-03-04 ENCOUNTER — HOSPITAL ENCOUNTER (OUTPATIENT)
Dept: INFUSION THERAPY | Age: 39
Discharge: HOME | End: 2019-03-04
Payer: COMMERCIAL

## 2019-03-04 ENCOUNTER — OFFICE VISIT (OUTPATIENT)
Dept: ONCOLOGY | Age: 39
End: 2019-03-04
Payer: COMMERCIAL

## 2019-03-04 VITALS
WEIGHT: 169.6 LBS | SYSTOLIC BLOOD PRESSURE: 124 MMHG | RESPIRATION RATE: 20 BRPM | HEIGHT: 62 IN | DIASTOLIC BLOOD PRESSURE: 78 MMHG | BODY MASS INDEX: 31.21 KG/M2 | TEMPERATURE: 98.3 F | HEART RATE: 75 BPM

## 2019-03-04 DIAGNOSIS — Z17.0 MALIGNANT NEOPLASM OF BREAST, STAGE 1, ESTROGEN RECEPTOR POSITIVE, UNSPECIFIED LATERALITY (HCC): ICD-10-CM

## 2019-03-04 DIAGNOSIS — C50.919 MALIGNANT NEOPLASM OF BREAST, STAGE 1, ESTROGEN RECEPTOR POSITIVE, UNSPECIFIED LATERALITY (HCC): ICD-10-CM

## 2019-03-04 PROCEDURE — 99212 OFFICE O/P EST SF 10 MIN: CPT | Performed by: INTERNAL MEDICINE

## 2019-03-04 PROCEDURE — 99214 OFFICE O/P EST MOD 30 MIN: CPT | Performed by: INTERNAL MEDICINE

## 2019-03-04 RX ORDER — ASPIRIN 81 MG/1
81 TABLET ORAL DAILY
Qty: 90 TABLET | Refills: 1 | COMMUNITY
Start: 2019-03-04 | End: 2019-04-08 | Stop reason: ALTCHOICE

## 2019-03-04 RX ORDER — TAMOXIFEN CITRATE 20 MG/1
20 TABLET ORAL DAILY
Qty: 90 TABLET | Refills: 1 | Status: SHIPPED | OUTPATIENT
Start: 2019-03-04 | End: 2019-04-08

## 2019-04-01 ASSESSMENT — ENCOUNTER SYMPTOMS
BACK PAIN: 0
SHORTNESS OF BREATH: 0
CHEST TIGHTNESS: 0
CONSTIPATION: 0
SORE THROAT: 0
VOMITING: 0
VOICE CHANGE: 0
NAUSEA: 0
BLOOD IN STOOL: 0
DIARRHEA: 0
EYE ITCHING: 0
SINUS PRESSURE: 0
CHOKING: 0
COUGH: 0
ABDOMINAL DISTENTION: 0
ABDOMINAL PAIN: 0
EYE DISCHARGE: 0
WHEEZING: 0
RHINORRHEA: 0
SINUS PAIN: 0
TROUBLE SWALLOWING: 0

## 2019-04-01 NOTE — PROGRESS NOTES
Subjective:  Right breast, 12:00:  Invasive, well-differentiated ductal carcinoma (grade 1)  Comment: Focal low-grade DCIS is also noted.  Calponin and p63 immunostains show a lack of myoepithelial layer which supports the interpretation.  The tumor has a [place] score of 2 (tubule formation) +2 (nuclear pleomorphism) +1 (mitotic count) = 5. ER positive >90%  MD positive   80%  HER/2 Negative (1+)  -Oncotype Dx recurrence score: 15  -RT was completed on 10/01/2018.  -ET:  Tamoxifen 20 mg po daily was started on 10/02/2018. She has tried multiple therapies (AI, Femara, and then back to       Patient ID: Deacon Rendon is a 41 y.o. female. HPI   The mass was located in the 12 o'clock position of right breast     Breast cancer risk factors include family hx on father's side, delayed child bearing, menarche before age 16, family hx of ovarian CA and gender. Cristina Hefty of menarche was 7. Last menstrual period was Mid month April. Patient has hormonal therapy, recent Depo, has been on OCPs or similar since 19y until 36y. Patient is Shiloh Ha denies nipple discharge     Bilateral Diagnostic Mammogram 05/03/2018: A spiculated mass is seen in the upper outer quadrant of the right breast in approximately the 11 o'clock position underlying the marked area of palpable concern. The spiculation surrounding the mass overall measures 3 cm diameter by mammography.      Right Breast Ultrasound on 05/03/2018:   Taller than wide macro-lobulated and micro-lobulated mass with posterior shadowing and increased blood flow in the 11 o'clock position of the right breast, 10 cm from the nipple measuring 10 x 9 x 9 mm with surrounding spiculation and retraction of connective tissue.     On 05/09/2018:  Right breast, 12:00 core needle biopsy: Invasive, well-differentiated ductal carcinoma (grade 1)  Comment: Focal low-grade DCIS is also noted.  Calponin and p63 immunostains show a lack of myoepithelial layer which supports the carcinoma  Negative for ductal carcinoma in situ    CANCER CASE SUMMARY  Procedure: Excision  Specimen laterality: Right  Tumor size: 1.2 x 1 x 0.7 cm (greatest dimension measured  microscopically)  Histologic type: Invasive carcinoma of no special type (ductal, not  otherwise specified)  Histologic grade (Melany histologic score)       Tubular differentiation: Score 1       Nuclear pleomorphism: Score 2       Mitotic rate: Score 1       Overall grade: Grade 1 (score 4)  Ductal carcinoma in situ: Present, negative for extensive intraductal  component       Nuclear grade: Grade II (intermediate)  Lobular carcinoma in situ: Not identified  Margins: Invasive carcinoma present at lateral/orange inked margin   Final posterior margin negative for carcinoma   Ductal carcinoma in situ present less than 1 mm from lateral/orange  inked margin  Treatment effect: No known presurgical therapy  Pathologic stage classification (pTNM, AJCC 8th Edition): pT1c (sn)pN0 [0  out of 4 lymph nodes]     ER positive >90%  VT positive   80%  HER/2 Negative (1+)     Oncotype DX Breast Cancer Report-Node Negative   Recurrence Score Result-15   Risk category-low risk     On 07/17/2018: Left breast, re-excision: Organizational changes of prior biopsy, no  evidence of residual neoplasm.  Margins of excision are negative for neoplasm.     Per Medical Oncology, Dr. Donnal Severs:  Given low score Oncotype Dx (her score is even less than 16 per TAIL[employer]x data published in ASCO 2018), No indication and No Benefit for adjuvant chemotherapy. Proceed with adjuvant RT followed by hormonal therapy     RT was completed on 10/01/2018. ET:  Tamoxifen 20 mg po daily was started on 10/02/2018 with fair tolerance. Robotic TLH/BSO 11/15/2018 by Dr. Wilfrido Agudelo at [facility]; Path negative for malignancy.     D/C Tamoxifen. -Arimidex 1 mg po daily was started on 01/07/2019 where she complained of significant myalgias.   D/C Arimidex.   -Femara 2.5 mg po daily was started on 02/04/2019 which she tolerated it poorly as well. Significant arthralgias affecting quality of life. D/C Femara.   -Tamoxifen resumed on 03/04/2019 per patient request.  -She stopped the Tamoxifen due to poor tolerance. Declining further ET. Past Medical History:   Diagnosis Date    Arthritis     Cancer ([facility] [address] 2018    breast cancer--right breast, ER/VA+ Her2-    Necrotizing granulomatous inflammation of lung (HCC)     states necrotizing granulomatous of the abdomen, not the lung     Past Surgical History:   Procedure Laterality Date    ABDOMEN SURGERY      laparotomy    BREAST REDUCTION SURGERY Bilateral 7/3/2018    RIGHT ONCOPLASTIC BREAST REDUCTION, MATCHING LEFT BREAST REDUCTION performed by Meche Malagon MD at [facility]  11/15/2018    abdominal, Laparoscopic    KNEE ARTHROSCOPY Left     LYMPH NODE BIOPSY      VA BIOPSY OF BREAST, NEEDLE CORE Right 7/3/2018    RIGHT BREAST NEEDLE LOCALIZATION LUMPECTOMY SENTINEL LYMPH NODE BIOPSY - TRIDENT AND NEOPROBE performed by Donis Lennox, MD at [address] [place] [address] Right 7/12/2018    RIGHTY AXILLARY  EXPLORATION WITH DRAIN PLACEMENT performed by Donis Lennox, MD at [address] [place] [address] OFFICE/OUTPT VISIT,PROCEDURE ONLY Right 7/17/2018    RE-EXCISION OF RIGHT BREAST LUMPECTOMY LATERAL MARGIN ---DR. HUERTA -- REVISION BREAST REDUCTION FOLLOWING LUMPECTOMY RE-EXCISION performed by Donis Lennox, MD at [address] [place] [address]       Social History     Socioeconomic History    Marital status: Single     Spouse name: Not on file    Number of children: Not on file    Years of education: Not on file    Highest education level: Not on file   Occupational History    Not on file   Social Needs    Financial resource strain: Not on file    Food insecurity:     Worry: Not on file     Inability: Not on file    Transportation needs:     Medical: Not on file Non-medical: Not on file   Tobacco Use    Smoking status: Current Every Day Smoker     Packs/day: 1.00     Years: 20.00     Pack years: 20.00    Smokeless tobacco: Never Used    Tobacco comment: [email or web address]    Substance and Sexual Activity    Alcohol use: Yes     Comment: ocassional    Drug use: No    Sexual activity: Yes     Partners: Male   Lifestyle    Physical activity:     Days per week: Not on file     Minutes per session: Not on file    Stress: Not on file   Relationships    Social connections:     Talks on phone: Not on file     Gets together: Not on file     Attends Voodoo service: Not on file     Active member of club or organization: Not on file     Attends meetings of clubs or organizations: Not on file     Relationship status: Not on file    Intimate partner violence:     Fear of current or ex partner: Not on file     Emotionally abused: Not on file     Physically abused: Not on file     Forced sexual activity: Not on file   Other Topics Concern    Not on file   Social History Narrative    Lives in [place] alone, family nearby. Works for [employer]. Allergies   Allergen Reactions    Ultram [Tramadol Hcl]      confused     Current Outpatient Medications on File Prior to Visit   Medication Sig Dispense Refill    tamoxifen (NOLVADEX) 20 MG tablet Take 1 tablet by mouth daily 90 tablet 1    aspirin EC 81 MG EC tablet Take 1 tablet by mouth daily 90 tablet 1     No current facility-administered medications on file prior to visit. Review of Systems   Constitutional: Negative for activity change, appetite change, chills, fatigue, fever and unexpected weight change. She feels well now that she has stopped the ET. HENT: Negative for congestion, postnasal drip, rhinorrhea, sinus pressure, sinus pain, sore throat, trouble swallowing and voice change. Eyes: Negative for discharge, itching and visual disturbance.    Respiratory: Negative for cough, choking, chest tightness, shortness of breath and wheezing. Cardiovascular: Negative for chest pain, palpitations and leg swelling. Gastrointestinal: Negative for abdominal distention, abdominal pain, blood in stool, constipation, diarrhea, nausea and vomiting. Endocrine: Negative for cold intolerance and heat intolerance. Genitourinary: Negative for difficulty urinating, dysuria, frequency and hematuria. Musculoskeletal: Negative for arthralgias, back pain, gait problem, joint swelling, myalgias, neck pain and neck stiffness. Allergic/Immunologic: Negative for environmental allergies and food allergies. Neurological: Negative for dizziness, seizures, syncope, speech difficulty, weakness, light-headedness and headaches. Hematological: Negative for adenopathy. Does not bruise/bleed easily. Psychiatric/Behavioral: Negative for agitation, confusion and decreased concentration. The patient is not nervous/anxious. Objective:   Physical Exam   Constitutional: She is oriented to person, place, and time. She appears well-developed and well-nourished. No distress. HENT:   Head: Normocephalic and atraumatic. Mouth/Throat: Oropharynx is clear and moist. No oropharyngeal exudate. Eyes: Conjunctivae and EOM are normal. Right eye exhibits no discharge. Left eye exhibits no discharge. No scleral icterus. Neck: Normal range of motion. Neck supple. No JVD present. No tracheal deviation present. No thyromegaly present. Cardiovascular: Normal rate and regular rhythm. Exam reveals no gallop and no friction rub. No murmur heard. Pulmonary/Chest: Effort normal and breath sounds normal. No stridor. No respiratory distress. She has no wheezes. She has no rales. She exhibits no mass, no tenderness, no bony tenderness, no laceration, no edema, no deformity, no swelling and no retraction. Right breast exhibits no inverted nipple, no mass, no nipple discharge, no skin change and no tenderness.  Left breast exhibits no inverted nipple, no mass, no nipple discharge, no skin change and no tenderness. Breasts are symmetrical.   Bilateral reconstructed breasts. Mild skin thickening on right secondary to post-treatment changes. Mild residual tanning post RT. Abdominal: Soft. She exhibits no distension. There is no tenderness. There is no rebound and no guarding. Musculoskeletal: Normal range of motion. She exhibits no edema, tenderness or deformity. Right shoulder: Normal.        Left shoulder: Normal.   Lymphadenopathy:     She has no cervical adenopathy. Right cervical: No superficial cervical, no deep cervical and no posterior cervical adenopathy present. Left cervical: No superficial cervical, no deep cervical and no posterior cervical adenopathy present. Right axillary: No pectoral and no lateral adenopathy present. Left axillary: No pectoral and no lateral adenopathy present. Neurological: She is alert and oriented to person, place, and time. Coordination normal.   Skin: Skin is warm and dry. No rash noted. She is not diaphoretic. No erythema. No pallor. Psychiatric: She has a normal mood and affect. Her behavior is normal. Judgment and thought content normal.   Nursing note and vitals reviewed. Assessment:     41 y.o. female who on 05/09/2018 underwent Right breast, 12:00 core needle biopsy: Invasive, well-differentiated ductal carcinoma (grade 1)  Comment: Focal low-grade DCIS is also noted.  Calponin and p63 immunostains show a lack of myoepithelial layer which supports the interpretation.  The tumor has a [place] score of 2 (tubule formation) +2 (nuclear pleomorphism) +1 (mitotic count) = 5. ER positive >90%  IL positive   80%  HER/2 Negative (1+)     CXR PA/Lateral on 05/17/2018:  No focal consolidation     Right axillary U/S on 05/17/2018:   There is no axillary lymphadenopathy.     MRI Bilateral Breast 05/30/2018:  A 1.3 x 0.8 x 1.1 cm spiculated, enhancing mass is identified in the right breast 12:00 with associated artifact from a metallic biopsy clip. No suspicious mass or non-mass enhancement seen on the left. There is no lymphadenopathy. Bilateral skin and nipple areolar complexes are normal. Visualized portions of the chest and abdomen are unremarkable.      Right breast blue dye injection, lumpectomy, sentinel lymph node excision was performed on 07/03/2018:  A.  Breast, left, reduction mammoplasty: Benign breast tissue with fibrocystic change  Unremarkable skin    B.  Romayor lymph node #1, excision: 2 lymph nodes, negative for metastatic carcinoma    C.  Romayor lymph node #2, excision: 2 lymph nodes, negative for metastatic carcinoma    D. Breast, right, oncoplastic reduction: Benign breast tissue with  fibrocystic change  Unremarkable skin    E.  Breast, right, lumpectomy: Invasive well-differentiated ductal   carcinoma (grade 1)  Ductal carcinoma in situ, intermediate nuclear grade  Lateral and posterior margins positive for invasive carcinoma  Ductal carcinoma in situ present less than 1 mm from lateral margin  Previous biopsy site identified  Fibrocystic change    F.  Right breast, additional posterior margin, excision: Fibrocystic  change  Negative for invasive carcinoma  Negative for ductal carcinoma in situ    G.  Right breast, additional medial margin, excision: Benign breast  tissue  Negative for invasive carcinoma  Negative for ductal carcinoma in situ    H.  Right breast, additional inferior margin, excision: Fibrocystic  change  Negative for invasive carcinoma  Negative for ductal carcinoma in situ    CANCER CASE SUMMARY  Procedure: Excision  Specimen laterality: Right  Tumor size: 1.2 x 1 x 0.7 cm (greatest dimension measured  microscopically)  Histologic type:  Invasive carcinoma of no special type (ductal, not  otherwise specified)  Histologic grade (Melany histologic score)       Tubular differentiation: Score 1       Nuclear pleomorphism: Score 2       Mitotic rate: Score 1       Overall grade: Grade 1 (score 4)  Ductal carcinoma in situ: Present, negative for extensive intraductal  component       Nuclear grade: Grade II (intermediate)  Lobular carcinoma in situ: Not identified  Margins: Invasive carcinoma present at lateral/orange inked margin   Final posterior margin negative for carcinoma   Ductal carcinoma in situ present less than 1 mm from lateral/orange  inked margin  Treatment effect: No known presurgical therapy  Pathologic stage classification (pTNM, AJCC 8th Edition): pT1c (sn)pN0 [0  out of 4 lymph nodes]     ER positive >90%  DC positive   80%  HER/2 Negative (1+)     Oncotype DX Breast Cancer Report-Node Negative   Recurrence Score Result-15 (low risk).       On 07/17/2018: Left breast, re-excision: Organizational changes of prior biopsy, no  evidence of residual neoplasm.  Margins of excision are negative for neoplasm.     Per Medical Oncology, Dr. Martín De Paz:  Given low score Oncotype Dx (her score is even less than 16 per [employer]x data published in ASCO 2018), No indication and No Benefit for adjuvant chemotherapy. Proceed with adjuvant RT followed by hormonal therapy     RT was completed on 10/01/2018. ET:  Tamoxifen 20 mg po daily was started on 10/02/2018 with fair tolerance. Robotic TLH/BSO 11/15/2018 by Dr. Roberto Aviles at [facility]; Path negative for malignancy.     D/C Tamoxifen. -Arimidex 1 mg po daily was started on 01/07/2019 where she complained of significant myalgias. D/C Arimidex.   -Femara 2.5 mg po daily was started on 02/04/2019 which she tolerated it poorly as well. Significant arthralgias affecting quality of life. D/C Femara.   -Tamoxifen resumed on 03/04/2019 per patient request   -She has discontinued the Tamoxifen and is declining any further Endocrine therapy due to poor tolerance (fatigue, malaise, aches). -Bilateral screening mammogram today, 04/08/2019:  Benign.     We has a long discussion about moving forward and follow up per NCCN guidelines. She will be moving to [place] at the end of May. I reminded her of the importance of close follow up and the need to establish care as soon as possible once she is in [place]. She was encouraged to take a copy of her records with her. Plan:   1. Continue monthly breast/chest wall self examination; detailed instructions reviewed today. Bring any changes to your physician's attention. 1. Continue healthy diet and exercise routinely as tolerated. 2. Maintaining ideal body weight (20-25 BMI) may lead to optimal breast cancer outcomes. 3. Avoid alcohol. 4. Repeat mammogram 1 year. 5. Given that she has declined ET; Would recommend f/u with MRI breast in 6 months due to high risk status and dense breast tissue on clinical exam.  6. US Left breast today to further evaluate nodule. 7. Continue follow up with Medical Oncology and Primary Care. 8. RTC 6 months +/- MRI is she does not re-locate to [place]. During today's visit, face-to-face time 25 minutes, greater than 50% in counseling education and coordination of care. All questions were answered to her apparent satisfaction, and she is agreeable to the plan as outlined above. Era Jones, RN, MSN, APRN-CNP, [address]  Advanced Oncology Certified Nurse Practitioner  Department of Breast Surgery  [facility]/  [facility] in collaboration with Dr. Nakul Leger.  [place] [address] [facility], APRN - CNP

## 2019-04-08 ENCOUNTER — OFFICE VISIT (OUTPATIENT)
Dept: ONCOLOGY | Age: 39
End: 2019-04-08
Payer: COMMERCIAL

## 2019-04-08 ENCOUNTER — OFFICE VISIT (OUTPATIENT)
Dept: BREAST CENTER | Age: 39
End: 2019-04-08
Payer: COMMERCIAL

## 2019-04-08 ENCOUNTER — HOSPITAL ENCOUNTER (OUTPATIENT)
Dept: INFUSION THERAPY | Age: 39
Discharge: HOME | End: 2019-04-08
Payer: COMMERCIAL

## 2019-04-08 ENCOUNTER — HOSPITAL ENCOUNTER (OUTPATIENT)
Dept: GENERAL RADIOLOGY | Age: 39
Discharge: HOME | End: 2019-04-10
Payer: COMMERCIAL

## 2019-04-08 ENCOUNTER — HOSPITAL ENCOUNTER (OUTPATIENT)
Age: 39
Discharge: HOME | End: 2019-04-08
Payer: COMMERCIAL

## 2019-04-08 VITALS
OXYGEN SATURATION: 98 % | HEIGHT: 62 IN | DIASTOLIC BLOOD PRESSURE: 68 MMHG | WEIGHT: 161.5 LBS | RESPIRATION RATE: 16 BRPM | TEMPERATURE: 98 F | BODY MASS INDEX: 29.72 KG/M2 | SYSTOLIC BLOOD PRESSURE: 108 MMHG | HEART RATE: 85 BPM

## 2019-04-08 VITALS
BODY MASS INDEX: 29.76 KG/M2 | TEMPERATURE: 97.6 F | RESPIRATION RATE: 16 BRPM | HEIGHT: 62 IN | HEART RATE: 74 BPM | SYSTOLIC BLOOD PRESSURE: 113 MMHG | DIASTOLIC BLOOD PRESSURE: 74 MMHG | WEIGHT: 161.7 LBS

## 2019-04-08 DIAGNOSIS — Z85.3 PERSONAL HISTORY OF BREAST CANCER: ICD-10-CM

## 2019-04-08 DIAGNOSIS — Z85.3 PERSONAL HISTORY OF BREAST CANCER: Primary | ICD-10-CM

## 2019-04-08 DIAGNOSIS — Z85.3 PERSONAL HISTORY OF MALIGNANT NEOPLASM OF BREAST: ICD-10-CM

## 2019-04-08 PROCEDURE — 99213 OFFICE O/P EST LOW 20 MIN: CPT | Performed by: NURSE PRACTITIONER

## 2019-04-08 PROCEDURE — 77066 DX MAMMO INCL CAD BI: CPT

## 2019-04-08 PROCEDURE — 99214 OFFICE O/P EST MOD 30 MIN: CPT | Performed by: INTERNAL MEDICINE

## 2019-04-08 PROCEDURE — 76642 ULTRASOUND BREAST LIMITED: CPT

## 2019-04-08 PROCEDURE — 99214 OFFICE O/P EST MOD 30 MIN: CPT | Performed by: NURSE PRACTITIONER

## 2019-04-08 RX ORDER — EXEMESTANE 25 MG/1
25 TABLET ORAL DAILY
Qty: 30 TABLET | Refills: 0 | Status: SHIPPED | OUTPATIENT
Start: 2019-04-08 | End: 2019-05-08

## 2019-04-08 NOTE — PROGRESS NOTES
Department of [facility] Oncology  Attending Clinic Note    Reason for Visit:  Follow-up on a patient with Right Breast Cancer    PCP:  Eleonora Osborn MD    History of Present Illness: The mass was located in the 12 o'clock position of right breast    Breast cancer risk factors include family hx on father's side, delayed child bearing, menarche before age 16, family hx of ovarian CA and gender. Windell Flori of menarche was 7. Last menstrual period was Mid month April. Patient has hormonal therapy, recent Depo, has been on OCPs or similar since 19y until 36y. Patient is Catherine Ch denies nipple discharge    Bilateral Diagnostic Mammogram 05/03/2018: A spiculated mass is seen in the upper outer quadrant of the right breast in approximately the 11 o'clock position underlying the marked area of palpable concern. The spiculation surrounding the mass overall measures 3 cm diameter by mammography. Right Breast Ultrasound on 05/03/2018: Taller than wide macro-lobulated and micro-lobulated mass with posterior shadowing and increased blood flow in the 11 o'clock position of the right breast, 10 cm from the nipple measuring 10 x 9 x 9 mm with surrounding spiculation and retraction of connective tissue. On 05/09/2018:  Right breast, 12:00 core needle biopsy: Invasive, well-differentiated ductal carcinoma (grade 1)  Comment: Focal low-grade DCIS is also noted.  Calponin and p63 immunostains show a lack of myoepithelial layer which supports the interpretation.  The tumor has a Fords score of 2 (tubule formation) +2 (nuclear pleomorphism) +1 (mitotic count) = 5. ER positive >90%  CA positive   80%  HER/2 Negative (1+)    CXR PA/Lateral on 05/17/2018:  No focal consolidation    Right axillary U/S on 05/17/2018: There is no axillary lymphadenopathy.     MRI Bilateral Breast 05/30/2018:  A 1.3 x 0.8 x 1.1 cm spiculated, enhancing mass is identified in the right breast 12:00 with associated artifact from a metallic biopsy clip. No suspicious mass or non-mass enhancement seen on the left. There is no lymphadenopathy. Bilateral skin and nipple areolar complexes are normal. Visualized portions of the chest and abdomen are unremarkable. Right breast blue dye injection, lumpectomy, sentinel lymph node excision was performed on 07/03/2018:  A.  Breast, left, reduction mammoplasty: Benign breast tissue with fibrocystic change  Unremarkable skin    B.  [place] lymph node #1, excision: 2 lymph nodes, negative for metastatic carcinoma    C.  [place] lymph node #2, excision: 2 lymph nodes, negative for metastatic carcinoma    D. Breast, right, oncoplastic reduction: Benign breast tissue with  fibrocystic change  Unremarkable skin    E.  Breast, right, lumpectomy: Invasive well-differentiated ductal   carcinoma (grade 1)  Ductal carcinoma in situ, intermediate nuclear grade  Lateral and posterior margins positive for invasive carcinoma  Ductal carcinoma in situ present less than 1 mm from lateral margin  Previous biopsy site identified  Fibrocystic change    F.  Right breast, additional posterior margin, excision: Fibrocystic  change  Negative for invasive carcinoma  Negative for ductal carcinoma in situ    G.  Right breast, additional medial margin, excision: Benign breast  tissue  Negative for invasive carcinoma  Negative for ductal carcinoma in situ    H.  Right breast, additional inferior margin, excision: Fibrocystic  change  Negative for invasive carcinoma  Negative for ductal carcinoma in situ    CANCER CASE SUMMARY  Procedure: Excision  Specimen laterality: Right  Tumor size: 1.2 x 1 x 0.7 cm (greatest dimension measured  microscopically)  Histologic type:  Invasive carcinoma of no special type (ductal, not  otherwise specified)  Histologic grade (Melany histologic score)       Tubular differentiation: Score 1       Nuclear pleomorphism: Score 2       Mitotic rate: Score 1       Overall grade: Grade 1 (score 4)  Ductal carcinoma in situ: Present, negative for extensive intraductal  component       Nuclear grade: Grade II (intermediate)  Lobular carcinoma in situ: Not identified  Margins: Invasive carcinoma present at lateral/orange inked margin   Final posterior margin negative for carcinoma   Ductal carcinoma in situ present less than 1 mm from lateral/orange  inked margin  Treatment effect: No known presurgical therapy  Pathologic stage classification (pTNM, AJCC 8th Edition): pT1c (sn)pN0 [0  out of 4 lymph nodes]    ER positive >90%  [place] positive   80%  HER/2 Negative (1+)    Oncotype DX Breast Cancer Report-Node Negative   Recurrence Score Result-15   Risk category-low risk    On 07/17/2018: Left breast, re-excision: Organizational changes of prior biopsy, no  evidence of residual neoplasm.  Margins of excision are negative for neoplasm. Given low score Oncotype Dx (her score is even less than 16 per [employer] data published in ASCO 2018), No indication and No Benefit for adjuvant chemotherapy. Proceed with adjuvant RT followed by hormonal therapy    RT was completed on 10/01/2018. Tamoxifen 20 mg po daily was started on 10/02/2018 with fair tolerance. Robotic TLH/BSO 11/15/2018 by Dr. Frank Vines at [facility]; Path negative for malignancy. D/C Tamoxifen. We recommended Arimidex 1 mg po daily. Side effects of Arimidex reviewed with patient. She agreed to proceed. Ca/VitD while on Arimidex. Arimidex 1 mg po daily was started on 01/07/2019 where she complained of significant myalgias. D/C Arimidex. Recommended Femara instead. Femara 2.5 mg po daily was started on 02/04/2019 which she tolerated it poorly as well. Significant arthralgias affecting quality of life. D/C Femara. Patient wanted to go back on Tamoxifen. Tamoxifen 20 mg po daily was re-started on 03/04/2019 with poor tolerance (significant arthralgias affecting quality of life). Review of Systems;  CONSTITUTIONAL: No fever, chills.  [address] appetite and energy level. ENMT: Eyes: No diplopia; Nose: No epistaxis. Mouth: No sore throat. RESPIRATORY: No hemoptysis, shortness of breath, cough. CARDIOVASCULAR: No chest pain, palpitations. GASTROINTESTINAL: No nausea/vomiting, diarrhea/constipation. GENITOURINARY: No dysuria, urinary frequency, hematuria. MSK: Significant arthralgias/myalgias affecting quality of life on Tamoxifen  NEURO: No syncope, presyncope, headache. Remainder:  ROS NEGATIVE    Past Medical History:      Diagnosis Date    Arthritis     Cancer ([facility] [address] 2018    breast cancer--right breast, ER/TN+ Her2-    Necrotizing granulomatous inflammation of lung (HCC)     states necrotizing granulomatous of the abdomen, not the lung     Medications:  Reviewed and reconciled. Allergies: Allergies   Allergen Reactions    Ultram [Tramadol Hcl]      confused     Physical Exam:  /74 (Site: Left Upper Arm, Position: Sitting, Cuff Size: Medium Adult)   Pulse 74   Temp 97.6 °F (36.4 °C) (Temporal)   Resp 16   Ht 5' 2\" (1.575 m)   Wt 161 lb 11.2 oz (73.3 kg)   LMP 04/18/2018 (Within Days)   BMI 29.58 kg/m²   GENERAL: Alert, oriented x 3, not in acute distress. HEENT: PERRLA; EOMI. Oropharynx clear. NECK: Supple. Without lymphadenopathy. LUNGS: Good air entry bilaterally. No wheezing, crackles or ronchi. CARDIOVASCULAR: Regular rate. No murmurs, rubs or gallops. ABDOMEN: Soft. non-distended. Positive bowel sounds. EXTREMITIES: Without clubbing, cyanosis, or edema. NEUROLOGIC: No focal deficits.    ECOG PS 1    Impression/Plan:  42 y/o female who underwent Right breast blue dye injection, lumpectomy, sentinel lymph node excision on 07/03/2018:  A.  Breast, left, reduction mammoplasty: Benign breast tissue with fibrocystic change  Unremarkable skin    B.  [place] lymph node #1, excision: 2 lymph nodes, negative for metastatic carcinoma    C.  [place] lymph node #2, excision: 2 lymph nodes, negative for metastatic carcinoma    D. Breast, right, oncoplastic reduction: Benign breast tissue with fibrocystic change  Unremarkable skin    E.  Breast, right, lumpectomy: Invasive well-differentiated ductal carcinoma (grade 1)  Ductal carcinoma in situ, intermediate nuclear grade  Lateral and posterior margins positive for invasive carcinoma  Ductal carcinoma in situ present less than 1 mm from lateral margin  Previous biopsy site identified  Fibrocystic change    F.  Right breast, additional posterior margin, excision: Fibrocystic change  Negative for invasive carcinoma  Negative for ductal carcinoma in situ    G.  Right breast, additional medial margin, excision: Benign breast tissue  Negative for invasive carcinoma  Negative for ductal carcinoma in situ    H.  Right breast, additional inferior margin, excision: Fibrocystic change  Negative for invasive carcinoma  Negative for ductal carcinoma in situ    CANCER CASE SUMMARY  Procedure: Excision  Specimen laterality: Right  Tumor size: 1.2 x 1 x 0.7 cm (greatest dimension measured microscopically)  Histologic type:  Invasive carcinoma of no special type (ductal, not otherwise specified)  Histologic grade ([place] histologic score)       Tubular differentiation: Score 1       Nuclear pleomorphism: Score 2       Mitotic rate: Score 1       Overall grade: Grade 1 (score 4)  Ductal carcinoma in situ: Present, negative for extensive intraductal component       Nuclear grade: Grade II (intermediate)  Lobular carcinoma in situ: Not identified  Margins: Invasive carcinoma present at lateral/orange inked margin   Final posterior margin negative for carcinoma   Ductal carcinoma in situ present less than 1 mm from lateral/orange inked margin  Treatment effect: No known presurgical therapy  Pathologic stage classification (pTNM, AJCC 8th Edition):   pT1c (sn)pN0 [0 out of 4 lymph nodes]    ER positive >90%  [place] positive   80%  HER/2 Negative (1+)    Oncotype DX Breast Cancer Report-Node Negative   Recurrence Score Result-15   Risk category-low risk    On 07/17/2018: Left breast, re-excision: Organizational changes of prior biopsy, no  evidence of residual neoplasm.  Margins of excision are negative for neoplasm. Given low score Oncotype Dx (her score is even less than 16 per [employer] data published in ASCO 2018), No indication and No Benefit for adjuvant chemotherapy. Proceed with adjuvant RT followed by hormonal therapy. RT was completed on 10/01/2018. Tamoxifen 20 mg po daily was started on 10/02/2018 with fair tolerance. Robotic TLH/BSO 11/15/2018 by Dr. Bernadine Ariza at [facility]; Path negative for malignancy. D/C Tamoxifen. We recommended Arimidex 1 mg po daily. Side effects of Arimidex reviewed with patient. She agreed to proceed. Ca/VitD while on Arimidex. DEXA 02/04/2019 normal in LS and hips. Arimidex 1 mg po daily was started on 01/07/2019 where she complained of significant myalgias affecting quality of life. D/C Arimidex. Recommended Femara instead. Femara 2.5 mg po daily was started on 02/04/2019 which she tolerated it poorly as well. Significant arthralgias affecting quality of life. D/C Femara. Patient wanted to go back on Tamoxifen. Tamoxifen 20 mg po daily was re-started on 03/04/2019 with poor tolerance (significant arthralgias affecting quality of life). D/C Tamoxifen. We recommended Exemestane 25 mg po daily. Side effects of Exemestane reviewed with patient. She agreed to proceed. RTC 4 weeks for Exemestane toxicity check.     Suhas Valdovinos MD   [id number]/0/[id number]  Board Certified Medical Oncologist   Board Certified Internal Medicine

## 2019-05-08 ENCOUNTER — HOSPITAL ENCOUNTER (OUTPATIENT)
Dept: INFUSION THERAPY | Age: 39
Discharge: HOME | End: 2019-05-08
Payer: COMMERCIAL

## 2019-05-08 ENCOUNTER — OFFICE VISIT (OUTPATIENT)
Dept: ONCOLOGY | Age: 39
End: 2019-05-08
Payer: COMMERCIAL

## 2019-05-08 VITALS
BODY MASS INDEX: 30.84 KG/M2 | TEMPERATURE: 97.7 F | RESPIRATION RATE: 18 BRPM | HEIGHT: 62 IN | HEART RATE: 74 BPM | DIASTOLIC BLOOD PRESSURE: 69 MMHG | WEIGHT: 167.6 LBS | SYSTOLIC BLOOD PRESSURE: 166 MMHG

## 2019-05-08 DIAGNOSIS — Z09 FOLLOW UP: Primary | ICD-10-CM

## 2019-05-08 LAB
ALBUMIN SERPL-MCNC: 3.8 G/DL (ref 3.5–5.2)
ALP BLD-CCNC: 162 U/L (ref 35–104)
ALT SERPL-CCNC: 8 U/L (ref 0–32)
ANION GAP SERPL CALCULATED.3IONS-SCNC: 14 MMOL/L (ref 7–16)
AST SERPL-CCNC: 19 U/L (ref 0–31)
BASOPHILS ABSOLUTE: 0.05 E9/L (ref 0–0.2)
BASOPHILS RELATIVE PERCENT: 0.5 % (ref 0–2)
BILIRUB SERPL-MCNC: 0.4 MG/DL (ref 0–1.2)
BUN BLDV-MCNC: 9 MG/DL (ref 6–20)
CALCIUM SERPL-MCNC: 9.2 MG/DL (ref 8.6–10.2)
CHLORIDE BLD-SCNC: 106 MMOL/L (ref 98–107)
CO2: 22 MMOL/L (ref 22–29)
CREAT SERPL-MCNC: 0.6 MG/DL (ref 0.5–1)
EOSINOPHILS ABSOLUTE: 0.09 E9/L (ref 0.05–0.5)
EOSINOPHILS RELATIVE PERCENT: 0.9 % (ref 0–6)
GFR AFRICAN AMERICAN: >60
GFR NON-AFRICAN AMERICAN: >60 ML/MIN/1.73
GLUCOSE BLD-MCNC: 70 MG/DL (ref 74–99)
HCT VFR BLD CALC: 40.9 % (ref 34–48)
HEMOGLOBIN: 13.2 G/DL (ref 11.5–15.5)
IMMATURE GRANULOCYTES #: 0.03 E9/L
IMMATURE GRANULOCYTES %: 0.3 % (ref 0–5)
LYMPHOCYTES ABSOLUTE: 1.89 E9/L (ref 1.5–4)
LYMPHOCYTES RELATIVE PERCENT: 18 % (ref 20–42)
MCH RBC QN AUTO: 29.8 PG (ref 26–35)
MCHC RBC AUTO-ENTMCNC: 32.3 % (ref 32–34.5)
MCV RBC AUTO: 92.3 FL (ref 80–99.9)
MONOCYTES ABSOLUTE: 0.52 E9/L (ref 0.1–0.95)
MONOCYTES RELATIVE PERCENT: 5 % (ref 2–12)
NEUTROPHILS ABSOLUTE: 7.9 E9/L (ref 1.8–7.3)
NEUTROPHILS RELATIVE PERCENT: 75.3 % (ref 43–80)
PDW BLD-RTO: 13.8 FL (ref 11.5–15)
PLATELET # BLD: 346 E9/L (ref 130–450)
PMV BLD AUTO: 10 FL (ref 7–12)
POTASSIUM SERPL-SCNC: 4.2 MMOL/L (ref 3.5–5)
RBC # BLD: 4.43 E12/L (ref 3.5–5.5)
SODIUM BLD-SCNC: 142 MMOL/L (ref 132–146)
TOTAL PROTEIN: 7.1 G/DL (ref 6.4–8.3)
WBC # BLD: 10.5 E9/L (ref 4.5–11.5)

## 2019-05-08 PROCEDURE — 80053 COMPREHEN METABOLIC PANEL: CPT

## 2019-05-08 PROCEDURE — 99212 OFFICE O/P EST SF 10 MIN: CPT | Performed by: INTERNAL MEDICINE

## 2019-05-08 PROCEDURE — 99214 OFFICE O/P EST MOD 30 MIN: CPT | Performed by: INTERNAL MEDICINE

## 2019-05-08 PROCEDURE — 85025 COMPLETE CBC W/AUTO DIFF WBC: CPT

## 2019-05-08 NOTE — PROGRESS NOTES
biopsy clip. No suspicious mass or non-mass enhancement seen on the left. There is no lymphadenopathy. Bilateral skin and nipple areolar complexes are normal. Visualized portions of the chest and abdomen are unremarkable. Right breast blue dye injection, lumpectomy, sentinel lymph node excision was performed on 07/03/2018:  A.  Breast, left, reduction mammoplasty: Benign breast tissue with fibrocystic change  Unremarkable skin    B.  [place] lymph node #1, excision: 2 lymph nodes, negative for metastatic carcinoma    C.  [place] lymph node #2, excision: 2 lymph nodes, negative for metastatic carcinoma    D. Breast, right, oncoplastic reduction: Benign breast tissue with  fibrocystic change  Unremarkable skin    E.  Breast, right, lumpectomy: Invasive well-differentiated ductal   carcinoma (grade 1)  Ductal carcinoma in situ, intermediate nuclear grade  Lateral and posterior margins positive for invasive carcinoma  Ductal carcinoma in situ present less than 1 mm from lateral margin  Previous biopsy site identified  Fibrocystic change    F.  Right breast, additional posterior margin, excision: Fibrocystic  change  Negative for invasive carcinoma  Negative for ductal carcinoma in situ    G.  Right breast, additional medial margin, excision: Benign breast  tissue  Negative for invasive carcinoma  Negative for ductal carcinoma in situ    H.  Right breast, additional inferior margin, excision: Fibrocystic  change  Negative for invasive carcinoma  Negative for ductal carcinoma in situ    CANCER CASE SUMMARY  Procedure: Excision  Specimen laterality: Right  Tumor size: 1.2 x 1 x 0.7 cm (greatest dimension measured  microscopically)  Histologic type:  Invasive carcinoma of no special type (ductal, not  otherwise specified)  Histologic grade (Melany histologic score)       Tubular differentiation: Score 1       Nuclear pleomorphism: Score 2       Mitotic rate: Score 1       Overall grade: Grade 1 (score 4)  Ductal carcinoma in situ: Present, negative for extensive intraductal  component       Nuclear grade: Grade II (intermediate)  Lobular carcinoma in situ: Not identified  Margins: Invasive carcinoma present at lateral/orange inked margin   Final posterior margin negative for carcinoma   Ductal carcinoma in situ present less than 1 mm from lateral/orange  inked margin  Treatment effect: No known presurgical therapy  Pathologic stage classification (pTNM, AJCC 8th Edition): pT1c (sn)pN0 [0  out of 4 lymph nodes]    ER positive >90%  [place] positive   80%  HER/2 Negative (1+)    Oncotype DX Breast Cancer Report-Node Negative   Recurrence Score Result-15   Risk category-low risk    On 07/17/2018: Left breast, re-excision: Organizational changes of prior biopsy, no  evidence of residual neoplasm.  Margins of excision are negative for neoplasm. Given low score Oncotype Dx (her score is even less than 16 per [employer] data published in ASCO 2018), No indication and No Benefit for adjuvant chemotherapy. Proceed with adjuvant RT followed by hormonal therapy    RT was completed on 10/01/2018. Tamoxifen 20 mg po daily was started on 10/02/2018 with fair tolerance. Robotic TLH/BSO 11/15/2018 by Dr. Hua Canales at [facility]; Path negative for malignancy. D/C Tamoxifen. We recommended Arimidex 1 mg po daily. Side effects of Arimidex reviewed with patient. She agreed to proceed. Ca/VitD while on Arimidex. Arimidex 1 mg po daily was started on 01/07/2019 where she complained of significant myalgias. D/C Arimidex. Recommended Femara instead. Femara 2.5 mg po daily was started on 02/04/2019 which she tolerated it poorly as well. Significant arthralgias affecting quality of life. D/C Femara. Patient wanted to go back on Tamoxifen. Tamoxifen 20 mg po daily was re-started on 03/04/2019 with poor tolerance (significant arthralgias affecting quality of life). D/C Tamoxifen. We recommended Exemestane 25 mg po daily.  Side effects of Exemestane reviewed with patient. She agreed to proceed. Exemestane 25 mg po daily was started on 04/08/2019 she also tolerated poorly (significant arthralgias affecting quality of life). Patient d/c Exemestane and wants to be observation only. Review of Systems;  CONSTITUTIONAL: No fever, chills. Fair appetite and energy level. ENMT: Eyes: No diplopia; Nose: No epistaxis. Mouth: No sore throat. RESPIRATORY: No hemoptysis, shortness of breath, cough. CARDIOVASCULAR: No chest pain, palpitations. GASTROINTESTINAL: No nausea/vomiting, diarrhea/constipation. GENITOURINARY: No dysuria, urinary frequency, hematuria. MSK: Significant arthralgias/myalgias affecting quality of life on Tamoxifen  NEURO: No syncope, presyncope, headache. Remainder:  ROS NEGATIVE    Past Medical History:      Diagnosis Date    Arthritis     [facility]    breast cancer--right breast, ER/OR+ Her2-    Necrotizing granulomatous inflammation of lung (HCC)     states necrotizing granulomatous of the abdomen, not the lung     Medications:  Reviewed and reconciled. Allergies: Allergies   Allergen Reactions    Ultram [Tramadol Hcl]      confused     Physical Exam:  BP (!) 166/69 (Site: Left Upper Arm, Position: Sitting, Cuff Size: Medium Adult)   Pulse 74   Temp 97.7 °F (36.5 °C) (Temporal)   Resp 18   Ht 5' 2\" (1.575 m)   Wt 167 lb 9.6 oz (76 kg)   LMP 04/18/2018 (Within Days)   BMI 30.65 kg/m²   GENERAL: Alert, oriented x 3, Tired  HEENT: PERRLA; EOMI. Oropharynx clear. NECK: Supple. Without lymphadenopathy. LUNGS: Good air entry bilaterally. No wheezing, crackles or ronchi. CARDIOVASCULAR: Regular rate. No murmurs, rubs or gallops. ABDOMEN: Soft. non-distended. Positive bowel sounds. EXTREMITIES: Without clubbing, cyanosis, or edema. NEUROLOGIC: No focal deficits.    ECOG PS 1-2    Impression/Plan:  42 y/o female who underwent Right breast blue dye injection, lumpectomy, sentinel lymph node excision on 07/03/2018:  A.  Breast, left, reduction mammoplasty: Benign breast tissue with fibrocystic change  Unremarkable skin    B.  [place] lymph node #1, excision: 2 lymph nodes, negative for metastatic carcinoma    C.  [place] lymph node #2, excision: 2 lymph nodes, negative for metastatic carcinoma    D. Breast, right, oncoplastic reduction: Benign breast tissue with fibrocystic change  Unremarkable skin    E.  Breast, right, lumpectomy: Invasive well-differentiated ductal carcinoma (grade 1)  Ductal carcinoma in situ, intermediate nuclear grade  Lateral and posterior margins positive for invasive carcinoma  Ductal carcinoma in situ present less than 1 mm from lateral margin  Previous biopsy site identified  Fibrocystic change    F.  Right breast, additional posterior margin, excision: Fibrocystic change  Negative for invasive carcinoma  Negative for ductal carcinoma in situ    G.  Right breast, additional medial margin, excision: Benign breast tissue  Negative for invasive carcinoma  Negative for ductal carcinoma in situ    H.  Right breast, additional inferior margin, excision: Fibrocystic change  Negative for invasive carcinoma  Negative for ductal carcinoma in situ    CANCER CASE SUMMARY  Procedure: Excision  Specimen laterality: Right  Tumor size: 1.2 x 1 x 0.7 cm (greatest dimension measured microscopically)  Histologic type:  Invasive carcinoma of no special type (ductal, not otherwise specified)  Histologic grade (Melany histologic score)       Tubular differentiation: Score 1       Nuclear pleomorphism: Score 2       Mitotic rate: Score 1       Overall grade: Grade 1 (score 4)  Ductal carcinoma in situ: Present, negative for extensive intraductal component       Nuclear grade: Grade II (intermediate)  Lobular carcinoma in situ: Not identified  Margins: Invasive carcinoma present at lateral/orange inked margin   Final posterior margin negative for carcinoma   Ductal carcinoma in situ present less than 1 mm from lateral/orange inked margin  Treatment effect: No known presurgical therapy  Pathologic stage classification (pTNM, AJCC 8th Edition):   pT1c (sn)pN0 [0 out of 4 lymph nodes]    ER positive >90%  [place] positive   80%  HER/2 Negative (1+)    Oncotype DX Breast Cancer Report-Node Negative   Recurrence Score Result-15   Risk category-low risk    On 07/17/2018: Left breast, re-excision: Organizational changes of prior biopsy, no  evidence of residual neoplasm.  Margins of excision are negative for neoplasm. Given low score Oncotype Dx (her score is even less than 16 per [employer] data published in ASCO 2018), No indication and No Benefit for adjuvant chemotherapy. Proceed with adjuvant RT followed by hormonal therapy. RT was completed on 10/01/2018. Tamoxifen 20 mg po daily was started on 10/02/2018 with fair tolerance. Robotic TLH/BSO 11/15/2018 by Dr. Kandy Willis at [facility]; Path negative for malignancy. D/C Tamoxifen. We recommended Arimidex 1 mg po daily. Side effects of Arimidex reviewed with patient. She agreed to proceed. Ca/VitD while on Arimidex. DEXA 02/04/2019 normal in LS and hips. Arimidex 1 mg po daily was started on 01/07/2019 where she complained of significant myalgias affecting quality of life. D/C Arimidex. Recommended Femara instead. Femara 2.5 mg po daily was started on 02/04/2019 which she tolerated it poorly as well. Significant arthralgias affecting quality of life. D/C Femara. Patient wanted to go back on Tamoxifen. Tamoxifen 20 mg po daily was re-started on 03/04/2019 with poor tolerance (significant arthralgias affecting quality of life). D/C Tamoxifen. We recommended Exemestane 25 mg po daily. Side effects of Exemestane reviewed with patient. She agreed to proceed. Exemestane 25 mg po daily was started on 04/08/2019 which she also tolerated poorly (significant arthralgias affecting quality of life).  Patient d/c Exemestane and wants

## 2019-07-15 ENCOUNTER — TELEPHONE (OUTPATIENT)
Dept: CASE MANAGEMENT | Age: 39
End: 2019-07-15

## 2019-08-07 DIAGNOSIS — Z85.3 PERSONAL HISTORY OF BREAST CANCER: Primary | ICD-10-CM

## 2019-08-07 DIAGNOSIS — Z17.0 MALIGNANT NEOPLASM OF UPPER-OUTER QUADRANT OF RIGHT BREAST IN FEMALE, ESTROGEN RECEPTOR POSITIVE (HCC): ICD-10-CM

## 2019-08-07 DIAGNOSIS — C50.919 MALIGNANT NEOPLASM OF BREAST, STAGE 1, ESTROGEN RECEPTOR POSITIVE, UNSPECIFIED LATERALITY (HCC): ICD-10-CM

## 2019-08-07 DIAGNOSIS — Z17.0 MALIGNANT NEOPLASM OF BREAST, STAGE 1, ESTROGEN RECEPTOR POSITIVE, UNSPECIFIED LATERALITY (HCC): ICD-10-CM

## 2019-08-07 DIAGNOSIS — C50.411 MALIGNANT NEOPLASM OF UPPER-OUTER QUADRANT OF RIGHT BREAST IN FEMALE, ESTROGEN RECEPTOR POSITIVE (HCC): ICD-10-CM

## 2019-08-08 ENCOUNTER — HOSPITAL ENCOUNTER (OUTPATIENT)
Dept: INFUSION THERAPY | Age: 39
Discharge: HOME | End: 2019-08-08
Payer: COMMERCIAL

## 2019-08-08 ENCOUNTER — OFFICE VISIT (OUTPATIENT)
Dept: ONCOLOGY | Age: 39
End: 2019-08-08
Payer: COMMERCIAL

## 2019-08-08 VITALS
DIASTOLIC BLOOD PRESSURE: 73 MMHG | HEART RATE: 84 BPM | HEIGHT: 62 IN | BODY MASS INDEX: 31.04 KG/M2 | WEIGHT: 168.7 LBS | SYSTOLIC BLOOD PRESSURE: 122 MMHG | TEMPERATURE: 98.1 F

## 2019-08-08 DIAGNOSIS — C50.919 MALIGNANT NEOPLASM OF BREAST, STAGE 1, ESTROGEN RECEPTOR POSITIVE, UNSPECIFIED LATERALITY (HCC): ICD-10-CM

## 2019-08-08 DIAGNOSIS — Z17.0 MALIGNANT NEOPLASM OF BREAST, STAGE 1, ESTROGEN RECEPTOR POSITIVE, UNSPECIFIED LATERALITY (HCC): ICD-10-CM

## 2019-08-08 DIAGNOSIS — C50.411 MALIGNANT NEOPLASM OF UPPER-OUTER QUADRANT OF RIGHT BREAST IN FEMALE, ESTROGEN RECEPTOR POSITIVE (HCC): ICD-10-CM

## 2019-08-08 DIAGNOSIS — Z17.0 MALIGNANT NEOPLASM OF UPPER-OUTER QUADRANT OF RIGHT BREAST IN FEMALE, ESTROGEN RECEPTOR POSITIVE (HCC): ICD-10-CM

## 2019-08-08 DIAGNOSIS — Z85.3 PERSONAL HISTORY OF BREAST CANCER: ICD-10-CM

## 2019-08-08 DIAGNOSIS — Z09 FOLLOW UP: Primary | ICD-10-CM

## 2019-08-08 LAB
ALBUMIN SERPL-MCNC: 4.3 G/DL (ref 3.5–5.2)
ALP BLD-CCNC: 187 U/L (ref 35–104)
ALT SERPL-CCNC: 11 U/L (ref 0–32)
ANION GAP SERPL CALCULATED.3IONS-SCNC: 8 MMOL/L (ref 7–16)
AST SERPL-CCNC: 18 U/L (ref 0–31)
BASOPHILS ABSOLUTE: 0.04 E9/L (ref 0–0.2)
BASOPHILS RELATIVE PERCENT: 0.4 % (ref 0–2)
BILIRUB SERPL-MCNC: 0.4 MG/DL (ref 0–1.2)
BUN BLDV-MCNC: 6 MG/DL (ref 6–20)
CALCIUM SERPL-MCNC: 9.9 MG/DL (ref 8.6–10.2)
CHLORIDE BLD-SCNC: 104 MMOL/L (ref 98–107)
CO2: 30 MMOL/L (ref 22–29)
CREAT SERPL-MCNC: 0.6 MG/DL (ref 0.5–1)
EOSINOPHILS ABSOLUTE: 0.13 E9/L (ref 0.05–0.5)
EOSINOPHILS RELATIVE PERCENT: 1.2 % (ref 0–6)
GFR AFRICAN AMERICAN: >60
GFR NON-AFRICAN AMERICAN: >60 ML/MIN/1.73
GLUCOSE BLD-MCNC: 64 MG/DL (ref 74–99)
HCT VFR BLD CALC: 40 % (ref 34–48)
HEMOGLOBIN: 13.1 G/DL (ref 11.5–15.5)
IMMATURE GRANULOCYTES #: 0.04 E9/L
IMMATURE GRANULOCYTES %: 0.4 % (ref 0–5)
LYMPHOCYTES ABSOLUTE: 2.61 E9/L (ref 1.5–4)
LYMPHOCYTES RELATIVE PERCENT: 23.6 % (ref 20–42)
MCH RBC QN AUTO: 29.7 PG (ref 26–35)
MCHC RBC AUTO-ENTMCNC: 32.8 % (ref 32–34.5)
MCV RBC AUTO: 90.7 FL (ref 80–99.9)
MONOCYTES ABSOLUTE: 0.68 E9/L (ref 0.1–0.95)
MONOCYTES RELATIVE PERCENT: 6.1 % (ref 2–12)
NEUTROPHILS ABSOLUTE: 7.56 E9/L (ref 1.8–7.3)
NEUTROPHILS RELATIVE PERCENT: 68.3 % (ref 43–80)
PDW BLD-RTO: 13.4 FL (ref 11.5–15)
PLATELET # BLD: 434 E9/L (ref 130–450)
PMV BLD AUTO: 9.2 FL (ref 7–12)
POTASSIUM SERPL-SCNC: 3.5 MMOL/L (ref 3.5–5)
RBC # BLD: 4.41 E12/L (ref 3.5–5.5)
SODIUM BLD-SCNC: 142 MMOL/L (ref 132–146)
TOTAL PROTEIN: 7.6 G/DL (ref 6.4–8.3)
WBC # BLD: 11.1 E9/L (ref 4.5–11.5)

## 2019-08-08 PROCEDURE — 36415 COLL VENOUS BLD VENIPUNCTURE: CPT

## 2019-08-08 PROCEDURE — 85025 COMPLETE CBC W/AUTO DIFF WBC: CPT

## 2019-08-08 PROCEDURE — 99214 OFFICE O/P EST MOD 30 MIN: CPT | Performed by: INTERNAL MEDICINE

## 2019-08-08 PROCEDURE — 80053 COMPREHEN METABOLIC PANEL: CPT

## 2019-08-08 PROCEDURE — 99212 OFFICE O/P EST SF 10 MIN: CPT

## 2019-08-08 RX ORDER — ZOLPIDEM TARTRATE 10 MG/1
TABLET ORAL
Refills: 0 | COMMUNITY
Start: 2019-07-30 | End: 2019-10-30

## 2019-08-08 NOTE — PROGRESS NOTES
Department of [facility] Oncology  Attending Clinic Note    Reason for Visit:  Follow-up on a patient with Right Breast Cancer    PCP:  Rosana Wilder MD    History of Present Illness: The mass was located in the 12 o'clock position of right breast    Breast cancer risk factors include family hx on father's side, delayed child bearing, menarche before age 16, family hx of ovarian CA and gender. Jatinder [place] of menarche was 7. Last menstrual period was Mid month April. Patient has hormonal therapy, recent Depo, has been on OCPs or similar since 19y until 36y. Patient is John Blood denies nipple discharge    Bilateral Diagnostic Mammogram 05/03/2018: A spiculated mass is seen in the upper outer quadrant of the right breast in approximately the 11 o'clock position underlying the marked area of palpable concern. The spiculation surrounding the mass overall measures 3 cm diameter by mammography. Right Breast Ultrasound on 05/03/2018: Taller than wide macro-lobulated and micro-lobulated mass with posterior shadowing and increased blood flow in the 11 o'clock position of the right breast, 10 cm from the nipple measuring 10 x 9 x 9 mm with surrounding spiculation and retraction of connective tissue. On 05/09/2018:  Right breast, 12:00 core needle biopsy: Invasive, well-differentiated ductal carcinoma (grade 1)  Comment: Focal low-grade DCIS is also noted.  Calponin and p63 immunostains show a lack of myoepithelial layer which supports the interpretation.  The tumor has a [place] score of 2 (tubule formation) +2 (nuclear pleomorphism) +1 (mitotic count) = 5. ER positive >90%  CA positive   80%  HER/2 Negative (1+)    CXR PA/Lateral on 05/17/2018:  No focal consolidation    Right axillary U/S on 05/17/2018: There is no axillary lymphadenopathy.     MRI Bilateral Breast 05/30/2018:  A 1.3 x 0.8 x 1.1 cm spiculated, enhancing mass is identified in the right breast 12:00 with associated artifact from a metallic carcinoma in situ: Present, negative for extensive intraductal  component       Nuclear grade: Grade II (intermediate)  Lobular carcinoma in situ: Not identified  Margins: Invasive carcinoma present at lateral/orange inked margin   Final posterior margin negative for carcinoma   Ductal carcinoma in situ present less than 1 mm from lateral/orange  inked margin  Treatment effect: No known presurgical therapy  Pathologic stage classification (pTNM, AJCC 8th Edition): pT1c (sn)pN0 [0  out of 4 lymph nodes]    ER positive >90%  HI positive   80%  HER/2 Negative (1+)    Oncotype DX Breast Cancer Report-Node Negative   Recurrence Score Result-15   Risk category-low risk    On 07/17/2018: Left breast, re-excision: Organizational changes of prior biopsy, no  evidence of residual neoplasm.  Margins of excision are negative for neoplasm. Given low score Oncotype Dx (her score is even less than 16 per [employer] data published in ASCO 2018), No indication and No Benefit for adjuvant chemotherapy. Proceed with adjuvant RT followed by hormonal therapy    RT was completed on 10/01/2018. Tamoxifen 20 mg po daily was started on 10/02/2018 with fair tolerance. Robotic TLH/BSO 11/15/2018 by Dr. Jennifer Joseph at [facility]; Path negative for malignancy. D/C Tamoxifen. We recommended Arimidex 1 mg po daily. Side effects of Arimidex reviewed with patient. She agreed to proceed. Ca/VitD while on Arimidex. Arimidex 1 mg po daily was started on 01/07/2019 where she complained of significant myalgias. D/C Arimidex. Recommended Femara instead. Femara 2.5 mg po daily was started on 02/04/2019 which she tolerated it poorly as well. Significant arthralgias affecting quality of life. D/C Femara. Patient wanted to go back on Tamoxifen. Tamoxifen 20 mg po daily was re-started on 03/04/2019 with poor tolerance (significant arthralgias affecting quality of life). D/C Tamoxifen. We recommended Exemestane 25 mg po daily.  Side effects of Exemestane reviewed with patient. She agreed to proceed. Exemestane 25 mg po daily was started on 04/08/2019 she also tolerated poorly (significant arthralgias affecting quality of life). Patient d/c Exemestane and wanted to be observation only. Review of Systems;  CONSTITUTIONAL: No fever, chills. Fair appetite and energy level. ENMT: Eyes: No diplopia; Nose: No epistaxis. Mouth: No sore throat. RESPIRATORY: No hemoptysis, shortness of breath, cough. CARDIOVASCULAR: No chest pain, palpitations. GASTROINTESTINAL: No nausea/vomiting, diarrhea/constipation. GENITOURINARY: No dysuria, urinary frequency, hematuria. NEURO: No syncope, presyncope, headache. Remainder:  ROS NEGATIVE    Past Medical History:      Diagnosis Date    Arthritis     Cancer ([facility] [address] 2018    breast cancer--right breast, ER/[place]+ Her2-    Necrotizing granulomatous inflammation of lung (HCC)     states necrotizing granulomatous of the abdomen, not the lung     Medications:  Reviewed and reconciled. Allergies: Allergies   Allergen Reactions    Ultram [Tramadol Hcl]      confused     Physical Exam:  /73 (Site: Left Upper Arm, Position: Sitting, Cuff Size: Medium Adult)   Pulse 84   Temp 98.1 °F (36.7 °C) (Temporal)   Ht 5' 2\" (1.575 m)   Wt 168 lb 11.2 oz (76.5 kg)   LMP 04/18/2018 (Within Days)   BMI 30.86 kg/m²   GENERAL: Alert, oriented x 3, not in acute distress. HEENT: PERRLA; EOMI. Oropharynx clear. NECK: Supple. Without lymphadenopathy. LUNGS: Good air entry bilaterally. No wheezing, crackles or ronchi. Breasts: Incision intact. No palpable masses or LN. CARDIOVASCULAR: Regular rate. No murmurs, rubs or gallops. ABDOMEN: Soft. non-distended. Positive bowel sounds. EXTREMITIES: Without clubbing, cyanosis, or edema. NEUROLOGIC: No focal deficits.    ECOG PS 1    Impression/Plan:  42 y/o female who underwent Right breast blue dye injection, lumpectomy, sentinel lymph node excision on 07/03/2018:  NENOjonathanronnie Prader, left, reduction mammoplasty: Benign breast tissue with fibrocystic change  Unremarkable skin    B.  [place] lymph node #1, excision: 2 lymph nodes, negative for metastatic carcinoma    C.  [place] lymph node #2, excision: 2 lymph nodes, negative for metastatic carcinoma    D. Breast, right, oncoplastic reduction: Benign breast tissue with fibrocystic change  Unremarkable skin    E.  Breast, right, lumpectomy: Invasive well-differentiated ductal carcinoma (grade 1)  Ductal carcinoma in situ, intermediate nuclear grade  Lateral and posterior margins positive for invasive carcinoma  Ductal carcinoma in situ present less than 1 mm from lateral margin  Previous biopsy site identified  Fibrocystic change    F.  Right breast, additional posterior margin, excision: Fibrocystic change  Negative for invasive carcinoma  Negative for ductal carcinoma in situ    G.  Right breast, additional medial margin, excision: Benign breast tissue  Negative for invasive carcinoma  Negative for ductal carcinoma in situ    H.  Right breast, additional inferior margin, excision: Fibrocystic change  Negative for invasive carcinoma  Negative for ductal carcinoma in situ    CANCER CASE SUMMARY  Procedure: Excision  Specimen laterality: Right  Tumor size: 1.2 x 1 x 0.7 cm (greatest dimension measured microscopically)  Histologic type:  Invasive carcinoma of no special type (ductal, not otherwise specified)  Histologic grade ([place] histologic score)       Tubular differentiation: Score 1       Nuclear pleomorphism: Score 2       Mitotic rate: Score 1       Overall grade: Grade 1 (score 4)  Ductal carcinoma in situ: Present, negative for extensive intraductal component       Nuclear grade: Grade II (intermediate)  Lobular carcinoma in situ: Not identified  Margins: Invasive carcinoma present at lateral/orange inked margin   Final posterior margin negative for carcinoma   Ductal carcinoma in situ present less than 1 mm

## 2019-10-15 ENCOUNTER — TELEPHONE (OUTPATIENT)
Dept: BREAST CENTER | Age: 39
End: 2019-10-15

## 2019-10-15 DIAGNOSIS — Z85.3 PERSONAL HISTORY OF BREAST CANCER: Primary | ICD-10-CM

## 2019-10-18 ENCOUNTER — TELEPHONE (OUTPATIENT)
Dept: BREAST CENTER | Age: 39
End: 2019-10-18

## 2019-10-21 ENCOUNTER — HOSPITAL ENCOUNTER (OUTPATIENT)
Age: 39
Discharge: HOME | End: 2019-10-21
Payer: COMMERCIAL

## 2019-10-21 DIAGNOSIS — Z85.3 PERSONAL HISTORY OF BREAST CANCER: ICD-10-CM

## 2019-10-21 LAB
BUN BLDV-MCNC: 8 MG/DL (ref 6–20)
CREAT SERPL-MCNC: 0.7 MG/DL (ref 0.5–1)
GFR AFRICAN AMERICAN: >60
GFR NON-AFRICAN AMERICAN: >60 ML/MIN/1.73

## 2019-10-21 PROCEDURE — 84520 ASSAY OF UREA NITROGEN: CPT

## 2019-10-21 PROCEDURE — 82565 ASSAY OF CREATININE: CPT

## 2019-10-24 ENCOUNTER — HOSPITAL ENCOUNTER (OUTPATIENT)
Dept: MRI IMAGING | Age: 39
Discharge: HOME | End: 2019-10-26
Payer: COMMERCIAL

## 2019-10-24 DIAGNOSIS — Z85.3 PERSONAL HISTORY OF BREAST CANCER: ICD-10-CM

## 2019-10-24 PROCEDURE — A9577 INJ MULTIHANCE: HCPCS | Performed by: RADIOLOGY

## 2019-10-24 PROCEDURE — 6360000004 HC RX CONTRAST MEDICATION: Performed by: RADIOLOGY

## 2019-10-24 PROCEDURE — 77049 MRI BREAST C-+ W/CAD BI: CPT

## 2019-10-24 RX ADMIN — GADOBENATE DIMEGLUMINE 20 ML: 529 INJECTION, SOLUTION INTRAVENOUS at 14:39

## 2019-10-25 ENCOUNTER — TELEPHONE (OUTPATIENT)
Dept: BREAST CENTER | Age: 39
End: 2019-10-25

## 2019-10-29 ASSESSMENT — ENCOUNTER SYMPTOMS
BACK PAIN: 0
ABDOMINAL PAIN: 0
SHORTNESS OF BREATH: 0
WHEEZING: 0
CHEST TIGHTNESS: 0
DIARRHEA: 0
VOMITING: 0
BLOOD IN STOOL: 0
CHOKING: 0
CONSTIPATION: 0
ABDOMINAL DISTENTION: 0
EYE ITCHING: 0
RHINORRHEA: 0
TROUBLE SWALLOWING: 0
SINUS PRESSURE: 0
NAUSEA: 0
COUGH: 0
EYE DISCHARGE: 0
SORE THROAT: 0
SINUS PAIN: 0
VOICE CHANGE: 0

## 2019-10-30 ENCOUNTER — HOSPITAL ENCOUNTER (OUTPATIENT)
Dept: GENERAL RADIOLOGY | Age: 39
Discharge: HOME | End: 2019-11-01
Payer: COMMERCIAL

## 2019-10-30 ENCOUNTER — OFFICE VISIT (OUTPATIENT)
Dept: BREAST CENTER | Age: 39
End: 2019-10-30
Payer: COMMERCIAL

## 2019-10-30 VITALS
DIASTOLIC BLOOD PRESSURE: 70 MMHG | BODY MASS INDEX: 26.68 KG/M2 | WEIGHT: 145 LBS | RESPIRATION RATE: 16 BRPM | OXYGEN SATURATION: 99 % | HEART RATE: 79 BPM | TEMPERATURE: 97.9 F | HEIGHT: 62 IN | SYSTOLIC BLOOD PRESSURE: 96 MMHG

## 2019-10-30 DIAGNOSIS — Z85.3 PERSONAL HISTORY OF BREAST CANCER: ICD-10-CM

## 2019-10-30 DIAGNOSIS — N63.0 BREAST LUMP: ICD-10-CM

## 2019-10-30 DIAGNOSIS — N63.21 LUMP IN UPPER OUTER QUADRANT OF LEFT BREAST: Primary | ICD-10-CM

## 2019-10-30 DIAGNOSIS — N63.21 LUMP IN UPPER OUTER QUADRANT OF LEFT BREAST: ICD-10-CM

## 2019-10-30 PROCEDURE — 2500000003 HC RX 250 WO HCPCS

## 2019-10-30 PROCEDURE — 88305 TISSUE EXAM BY PATHOLOGIST: CPT

## 2019-10-30 PROCEDURE — 19083 BX BREAST 1ST LESION US IMAG: CPT

## 2019-10-30 PROCEDURE — 99213 OFFICE O/P EST LOW 20 MIN: CPT | Performed by: NURSE PRACTITIONER

## 2019-10-30 PROCEDURE — 76642 ULTRASOUND BREAST LIMITED: CPT

## 2019-10-30 PROCEDURE — 77065 DX MAMMO INCL CAD UNI: CPT

## 2019-10-30 PROCEDURE — 99214 OFFICE O/P EST MOD 30 MIN: CPT | Performed by: NURSE PRACTITIONER

## 2019-10-30 RX ORDER — TRAZODONE HYDROCHLORIDE 100 MG/1
100 TABLET ORAL NIGHTLY
COMMUNITY
End: 2021-01-05

## 2019-11-05 ENCOUNTER — TELEPHONE (OUTPATIENT)
Dept: BREAST CENTER | Age: 39
End: 2019-11-05

## 2019-11-25 ENCOUNTER — HOSPITAL ENCOUNTER (EMERGENCY)
Age: 39
Discharge: HOME | End: 2019-11-25
Payer: COMMERCIAL

## 2019-11-25 ENCOUNTER — APPOINTMENT (OUTPATIENT)
Dept: GENERAL RADIOLOGY | Age: 39
End: 2019-11-25
Payer: COMMERCIAL

## 2019-11-25 VITALS
SYSTOLIC BLOOD PRESSURE: 147 MMHG | OXYGEN SATURATION: 98 % | BODY MASS INDEX: 26.68 KG/M2 | RESPIRATION RATE: 14 BRPM | DIASTOLIC BLOOD PRESSURE: 89 MMHG | TEMPERATURE: 98.8 F | HEART RATE: 94 BPM | HEIGHT: 62 IN | WEIGHT: 145 LBS

## 2019-11-25 DIAGNOSIS — M53.3 SACRAL BACK PAIN: ICD-10-CM

## 2019-11-25 DIAGNOSIS — M25.551 RIGHT HIP PAIN: Primary | ICD-10-CM

## 2019-11-25 PROCEDURE — 99283 EMERGENCY DEPT VISIT LOW MDM: CPT

## 2019-11-25 PROCEDURE — 6370000000 HC RX 637 (ALT 250 FOR IP): Performed by: PHYSICIAN ASSISTANT

## 2019-11-25 PROCEDURE — 73502 X-RAY EXAM HIP UNI 2-3 VIEWS: CPT

## 2019-11-25 RX ORDER — PREDNISONE 20 MG/1
60 TABLET ORAL ONCE
Status: COMPLETED | OUTPATIENT
Start: 2019-11-25 | End: 2019-11-25

## 2019-11-25 RX ORDER — METHYLPREDNISOLONE 4 MG/1
TABLET ORAL
Qty: 1 KIT | Refills: 0 | Status: SHIPPED | OUTPATIENT
Start: 2019-11-25 | End: 2019-12-01

## 2019-11-25 RX ORDER — ORPHENADRINE CITRATE 100 MG/1
100 TABLET, EXTENDED RELEASE ORAL 2 TIMES DAILY
Qty: 20 TABLET | Refills: 0 | Status: SHIPPED | OUTPATIENT
Start: 2019-11-25 | End: 2019-12-05

## 2019-11-25 RX ORDER — ACETAMINOPHEN 500 MG
1000 TABLET ORAL ONCE
Status: COMPLETED | OUTPATIENT
Start: 2019-11-25 | End: 2019-11-25

## 2019-11-25 RX ADMIN — PREDNISONE 60 MG: 20 TABLET ORAL at 12:32

## 2019-11-25 RX ADMIN — ACETAMINOPHEN 1000 MG: 500 TABLET ORAL at 12:32

## 2019-11-25 ASSESSMENT — PAIN DESCRIPTION - PAIN TYPE: TYPE: ACUTE PAIN

## 2019-11-25 ASSESSMENT — PAIN DESCRIPTION - ORIENTATION: ORIENTATION: RIGHT

## 2019-11-25 ASSESSMENT — PAIN DESCRIPTION - DESCRIPTORS: DESCRIPTORS: ACHING

## 2019-11-25 ASSESSMENT — PAIN DESCRIPTION - PROGRESSION: CLINICAL_PROGRESSION: GRADUALLY WORSENING

## 2019-11-25 ASSESSMENT — PAIN SCALES - GENERAL
PAINLEVEL_OUTOF10: 6
PAINLEVEL_OUTOF10: 7

## 2019-11-25 ASSESSMENT — PAIN DESCRIPTION - FREQUENCY: FREQUENCY: CONTINUOUS

## 2019-11-25 ASSESSMENT — PAIN DESCRIPTION - LOCATION: LOCATION: HIP

## 2019-12-16 ENCOUNTER — HOSPITAL ENCOUNTER (OUTPATIENT)
Dept: MRI IMAGING | Age: 39
Discharge: HOME | End: 2019-12-18
Payer: COMMERCIAL

## 2019-12-16 DIAGNOSIS — M54.50 LOW BACK PAIN, UNSPECIFIED BACK PAIN LATERALITY, UNSPECIFIED CHRONICITY, UNSPECIFIED WHETHER SCIATICA PRESENT: ICD-10-CM

## 2019-12-16 PROCEDURE — 72148 MRI LUMBAR SPINE W/O DYE: CPT

## 2020-01-08 ENCOUNTER — OFFICE VISIT (OUTPATIENT)
Dept: ONCOLOGY | Age: 40
End: 2020-01-08
Payer: COMMERCIAL

## 2020-01-08 ENCOUNTER — HOSPITAL ENCOUNTER (OUTPATIENT)
Dept: INFUSION THERAPY | Age: 40
Discharge: HOME | End: 2020-01-08
Payer: COMMERCIAL

## 2020-01-08 VITALS
OXYGEN SATURATION: 97 % | SYSTOLIC BLOOD PRESSURE: 97 MMHG | DIASTOLIC BLOOD PRESSURE: 58 MMHG | WEIGHT: 145 LBS | TEMPERATURE: 98.3 F | HEIGHT: 62 IN | HEART RATE: 77 BPM | BODY MASS INDEX: 26.68 KG/M2

## 2020-01-08 DIAGNOSIS — C50.411 MALIGNANT NEOPLASM OF UPPER-OUTER QUADRANT OF RIGHT BREAST IN FEMALE, ESTROGEN RECEPTOR POSITIVE (HCC): ICD-10-CM

## 2020-01-08 DIAGNOSIS — Z17.0 MALIGNANT NEOPLASM OF UPPER-OUTER QUADRANT OF RIGHT BREAST IN FEMALE, ESTROGEN RECEPTOR POSITIVE (HCC): ICD-10-CM

## 2020-01-08 DIAGNOSIS — Z17.0 MALIGNANT NEOPLASM OF BREAST, STAGE 1, ESTROGEN RECEPTOR POSITIVE, UNSPECIFIED LATERALITY (HCC): ICD-10-CM

## 2020-01-08 DIAGNOSIS — C50.919 MALIGNANT NEOPLASM OF BREAST, STAGE 1, ESTROGEN RECEPTOR POSITIVE, UNSPECIFIED LATERALITY (HCC): ICD-10-CM

## 2020-01-08 LAB
ALBUMIN SERPL-MCNC: 4.2 G/DL (ref 3.5–5.2)
ALP BLD-CCNC: 167 U/L (ref 35–104)
ALT SERPL-CCNC: 6 U/L (ref 0–32)
ANION GAP SERPL CALCULATED.3IONS-SCNC: 14 MMOL/L (ref 7–16)
AST SERPL-CCNC: 16 U/L (ref 0–31)
BASOPHILS ABSOLUTE: 0.03 E9/L (ref 0–0.2)
BASOPHILS RELATIVE PERCENT: 0.4 % (ref 0–2)
BILIRUB SERPL-MCNC: 0.4 MG/DL (ref 0–1.2)
BUN BLDV-MCNC: 6 MG/DL (ref 6–20)
CALCIUM SERPL-MCNC: 9.5 MG/DL (ref 8.6–10.2)
CHLORIDE BLD-SCNC: 102 MMOL/L (ref 98–107)
CO2: 25 MMOL/L (ref 22–29)
CREAT SERPL-MCNC: 0.7 MG/DL (ref 0.5–1)
EOSINOPHILS ABSOLUTE: 0.03 E9/L (ref 0.05–0.5)
EOSINOPHILS RELATIVE PERCENT: 0.4 % (ref 0–6)
GFR AFRICAN AMERICAN: >60
GFR NON-AFRICAN AMERICAN: >60 ML/MIN/1.73
GLUCOSE BLD-MCNC: 79 MG/DL (ref 74–99)
HCT VFR BLD CALC: 42.5 % (ref 34–48)
HEMOGLOBIN: 13.7 G/DL (ref 11.5–15.5)
IMMATURE GRANULOCYTES #: 0.01 E9/L
IMMATURE GRANULOCYTES %: 0.1 % (ref 0–5)
LYMPHOCYTES ABSOLUTE: 2.98 E9/L (ref 1.5–4)
LYMPHOCYTES RELATIVE PERCENT: 35.5 % (ref 20–42)
MCH RBC QN AUTO: 29.3 PG (ref 26–35)
MCHC RBC AUTO-ENTMCNC: 32.2 % (ref 32–34.5)
MCV RBC AUTO: 91 FL (ref 80–99.9)
MONOCYTES ABSOLUTE: 0.48 E9/L (ref 0.1–0.95)
MONOCYTES RELATIVE PERCENT: 5.7 % (ref 2–12)
NEUTROPHILS ABSOLUTE: 4.87 E9/L (ref 1.8–7.3)
NEUTROPHILS RELATIVE PERCENT: 57.9 % (ref 43–80)
PDW BLD-RTO: 14.5 FL (ref 11.5–15)
PLATELET # BLD: 370 E9/L (ref 130–450)
PMV BLD AUTO: 9.9 FL (ref 7–12)
POTASSIUM SERPL-SCNC: 3.7 MMOL/L (ref 3.5–5)
RBC # BLD: 4.67 E12/L (ref 3.5–5.5)
SODIUM BLD-SCNC: 141 MMOL/L (ref 132–146)
TOTAL PROTEIN: 7 G/DL (ref 6.4–8.3)
WBC # BLD: 8.4 E9/L (ref 4.5–11.5)

## 2020-01-08 PROCEDURE — 36415 COLL VENOUS BLD VENIPUNCTURE: CPT

## 2020-01-08 PROCEDURE — 99214 OFFICE O/P EST MOD 30 MIN: CPT | Performed by: INTERNAL MEDICINE

## 2020-01-08 PROCEDURE — 85025 COMPLETE CBC W/AUTO DIFF WBC: CPT

## 2020-01-08 PROCEDURE — 80053 COMPREHEN METABOLIC PANEL: CPT

## 2020-01-08 PROCEDURE — 99212 OFFICE O/P EST SF 10 MIN: CPT

## 2020-01-08 NOTE — PROGRESS NOTES
Department of [facility] Oncology  Attending Clinic Note    Reason for Visit:  Follow-up on a patient with Right Breast Cancer    PCP:  Jere Wright MD    History of Present Illness: The mass was located in the 12 o'clock position of right breast    Breast cancer risk factors include family hx on father's side, delayed child bearing, menarche before age 16, family hx of ovarian CA and gender. [place] Less of menarche was 7. Last menstrual period was Mid month April. Patient has hormonal therapy, recent Depo, has been on OCPs or similar since 19y until 36y. Patient is Tyesha Aguirre denies nipple discharge    Bilateral Diagnostic Mammogram 05/03/2018: A spiculated mass is seen in the upper outer quadrant of the right breast in approximately the 11 o'clock position underlying the marked area of palpable concern. The spiculation surrounding the mass overall measures 3 cm diameter by mammography. Right Breast Ultrasound on 05/03/2018: Taller than wide macro-lobulated and micro-lobulated mass with posterior shadowing and increased blood flow in the 11 o'clock position of the right breast, 10 cm from the nipple measuring 10 x 9 x 9 mm with surrounding spiculation and retraction of connective tissue. On 05/09/2018:  Right breast, 12:00 core needle biopsy: Invasive, well-differentiated ductal carcinoma (grade 1)  Comment: Focal low-grade DCIS is also noted.  Calponin and p63 immunostains show a lack of myoepithelial layer which supports the interpretation.  The tumor has a [place] score of 2 (tubule formation) +2 (nuclear pleomorphism) +1 (mitotic count) = 5. ER positive >90%  ND positive   80%  HER/2 Negative (1+)    CXR PA/Lateral on 05/17/2018:  No focal consolidation    Right axillary U/S on 05/17/2018: There is no axillary lymphadenopathy.     MRI Bilateral Breast 05/30/2018:  A 1.3 x 0.8 x 1.1 cm spiculated, enhancing mass is identified in the right breast 12:00 with associated artifact from a metallic

## 2020-03-30 ENCOUNTER — TELEPHONE (OUTPATIENT)
Dept: BREAST CENTER | Age: 40
End: 2020-03-30

## 2020-05-11 ENCOUNTER — HOSPITAL ENCOUNTER (OUTPATIENT)
Dept: INFUSION THERAPY | Age: 40
Discharge: HOME | End: 2020-05-11
Payer: COMMERCIAL

## 2020-05-11 ENCOUNTER — OFFICE VISIT (OUTPATIENT)
Dept: ONCOLOGY | Age: 40
End: 2020-05-11
Payer: COMMERCIAL

## 2020-05-11 VITALS
DIASTOLIC BLOOD PRESSURE: 67 MMHG | TEMPERATURE: 99 F | SYSTOLIC BLOOD PRESSURE: 115 MMHG | HEIGHT: 62 IN | BODY MASS INDEX: 27.81 KG/M2 | OXYGEN SATURATION: 99 % | HEART RATE: 84 BPM | WEIGHT: 151.1 LBS

## 2020-05-11 DIAGNOSIS — C50.411 MALIGNANT NEOPLASM OF UPPER-OUTER QUADRANT OF RIGHT BREAST IN FEMALE, ESTROGEN RECEPTOR POSITIVE (HCC): ICD-10-CM

## 2020-05-11 DIAGNOSIS — Z17.0 MALIGNANT NEOPLASM OF BREAST, STAGE 1, ESTROGEN RECEPTOR POSITIVE, UNSPECIFIED LATERALITY (HCC): ICD-10-CM

## 2020-05-11 DIAGNOSIS — C50.919 MALIGNANT NEOPLASM OF BREAST, STAGE 1, ESTROGEN RECEPTOR POSITIVE, UNSPECIFIED LATERALITY (HCC): ICD-10-CM

## 2020-05-11 DIAGNOSIS — Z17.0 MALIGNANT NEOPLASM OF UPPER-OUTER QUADRANT OF RIGHT BREAST IN FEMALE, ESTROGEN RECEPTOR POSITIVE (HCC): ICD-10-CM

## 2020-05-11 LAB
ALBUMIN SERPL-MCNC: 4.2 G/DL (ref 3.5–5.2)
ALP BLD-CCNC: 140 U/L (ref 35–104)
ALT SERPL-CCNC: 11 U/L (ref 0–32)
ANION GAP SERPL CALCULATED.3IONS-SCNC: 11 MMOL/L (ref 7–16)
AST SERPL-CCNC: 15 U/L (ref 0–31)
BASOPHILS ABSOLUTE: 0.04 E9/L (ref 0–0.2)
BASOPHILS RELATIVE PERCENT: 0.4 % (ref 0–2)
BILIRUB SERPL-MCNC: 0.4 MG/DL (ref 0–1.2)
BUN BLDV-MCNC: 13 MG/DL (ref 6–20)
CALCIUM SERPL-MCNC: 9.5 MG/DL (ref 8.6–10.2)
CHLORIDE BLD-SCNC: 103 MMOL/L (ref 98–107)
CO2: 27 MMOL/L (ref 22–29)
CREAT SERPL-MCNC: 0.7 MG/DL (ref 0.5–1)
EOSINOPHILS ABSOLUTE: 0.05 E9/L (ref 0.05–0.5)
EOSINOPHILS RELATIVE PERCENT: 0.5 % (ref 0–6)
GFR AFRICAN AMERICAN: >60
GFR NON-AFRICAN AMERICAN: >60 ML/MIN/1.73
GLUCOSE BLD-MCNC: 80 MG/DL (ref 74–99)
HCT VFR BLD CALC: 40.9 % (ref 34–48)
HEMOGLOBIN: 13.1 G/DL (ref 11.5–15.5)
IMMATURE GRANULOCYTES #: 0.05 E9/L
IMMATURE GRANULOCYTES %: 0.5 % (ref 0–5)
LYMPHOCYTES ABSOLUTE: 2.58 E9/L (ref 1.5–4)
LYMPHOCYTES RELATIVE PERCENT: 26.8 % (ref 20–42)
MCH RBC QN AUTO: 30.1 PG (ref 26–35)
MCHC RBC AUTO-ENTMCNC: 32 % (ref 32–34.5)
MCV RBC AUTO: 94 FL (ref 80–99.9)
MONOCYTES ABSOLUTE: 0.62 E9/L (ref 0.1–0.95)
MONOCYTES RELATIVE PERCENT: 6.5 % (ref 2–12)
NEUTROPHILS ABSOLUTE: 6.27 E9/L (ref 1.8–7.3)
NEUTROPHILS RELATIVE PERCENT: 65.3 % (ref 43–80)
PDW BLD-RTO: 13.1 FL (ref 11.5–15)
PLATELET # BLD: 385 E9/L (ref 130–450)
PMV BLD AUTO: 8.7 FL (ref 7–12)
POTASSIUM SERPL-SCNC: 4.3 MMOL/L (ref 3.5–5)
RBC # BLD: 4.35 E12/L (ref 3.5–5.5)
SODIUM BLD-SCNC: 141 MMOL/L (ref 132–146)
TOTAL PROTEIN: 6.9 G/DL (ref 6.4–8.3)
WBC # BLD: 9.6 E9/L (ref 4.5–11.5)

## 2020-05-11 PROCEDURE — 99212 OFFICE O/P EST SF 10 MIN: CPT

## 2020-05-11 PROCEDURE — 80053 COMPREHEN METABOLIC PANEL: CPT

## 2020-05-11 PROCEDURE — 99214 OFFICE O/P EST MOD 30 MIN: CPT | Performed by: INTERNAL MEDICINE

## 2020-05-11 PROCEDURE — 85025 COMPLETE CBC W/AUTO DIFF WBC: CPT

## 2020-05-11 PROCEDURE — 36415 COLL VENOUS BLD VENIPUNCTURE: CPT

## 2020-05-11 NOTE — PROGRESS NOTES
carcinoma in situ: Present, negative for extensive intraductal  component       Nuclear grade: Grade II (intermediate)  Lobular carcinoma in situ: Not identified  Margins: Invasive carcinoma present at lateral/orange inked margin   Final posterior margin negative for carcinoma   Ductal carcinoma in situ present less than 1 mm from lateral/orange  inked margin  Treatment effect: No known presurgical therapy  Pathologic stage classification (pTNM, AJCC 8th Edition): pT1c (sn)pN0 [0  out of 4 lymph nodes]    ER positive >90%  SC positive   80%  HER/2 Negative (1+)    Oncotype DX Breast Cancer Report-Node Negative   Recurrence Score Result-15   Risk category-low risk    On 07/17/2018: Left breast, re-excision: Organizational changes of prior biopsy, no  evidence of residual neoplasm.  Margins of excision are negative for neoplasm. Given low score Oncotype Dx (her score is even less than 16 per [employer] data published in ASCO 2018), No indication and No Benefit for adjuvant chemotherapy. Proceed with adjuvant RT followed by hormonal therapy    RT was completed on 10/01/2018. Tamoxifen 20 mg po daily was started on 10/02/2018 with fair tolerance. Robotic TLH/BSO 11/15/2018 by Dr. Saurabh Jensen at [facility]; Path negative for malignancy. D/C Tamoxifen. We recommended Arimidex 1 mg po daily. Side effects of Arimidex reviewed with patient. She agreed to proceed. Ca/VitD while on Arimidex. Arimidex 1 mg po daily was started on 01/07/2019 where she complained of significant myalgias. D/C Arimidex. Recommended Femara instead. Femara 2.5 mg po daily was started on 02/04/2019 which she tolerated it poorly as well. Significant arthralgias affecting quality of life. D/C Femara. Patient wanted to go back on Tamoxifen. Tamoxifen 20 mg po daily was re-started on 03/04/2019 with poor tolerance (significant arthralgias affecting quality of life). D/C Tamoxifen. We recommended Exemestane 25 mg po daily.  Side with patient. She agreed to proceed. Exemestane 25 mg po daily was started on 04/08/2019 which she also tolerated poorly (significant arthralgias affecting quality of life). Patient d/c Exemestane and wanted to be observation only. Bilateral Diagnostic Mammogram/Left Breast 04/08/2019 negative for malignancy. Bilateral Breast MRI 10/24/2019: Linear non mass enhancement in the upper outer left breast is probably benign. It is likely due to postsurgical changes/fat necrosis. No evidence of neoplasm on the right. Left Breast US 10/30/2019: There is an oval solid mass measuring 6 mm seen in the left breast. Internal echogenicity is isoechoic. The finding shows no significant increase in vascularity. Left Breast Ultrasound Core Needle Biopsy 10/30/2019:   Left breast, 3 o'clock position, core biopsy: Fat necrosis with foreign body reaction and focal calcification. Negative for epithelial hyperplasia or neoplasia. NILDA. Patient wants to continue on observation only. RTC in 6 months. Scheduled with Breast Surgery Team June 2020 with Bilateral Screening Mammogram same day.     Jarod Matt MD   [id number]  Board Certified Medical Oncologist   Board Certified Internal Medicine

## 2020-06-04 ASSESSMENT — ENCOUNTER SYMPTOMS
TROUBLE SWALLOWING: 0
CHEST TIGHTNESS: 0
RHINORRHEA: 0
VOICE CHANGE: 0
SINUS PRESSURE: 0
SORE THROAT: 0
VOMITING: 0
COUGH: 0
SINUS PAIN: 0
ABDOMINAL PAIN: 0
DIARRHEA: 0
EYE ITCHING: 0
CHOKING: 0
EYE DISCHARGE: 0
ABDOMINAL DISTENTION: 0
NAUSEA: 0
WHEEZING: 0
CONSTIPATION: 0
BACK PAIN: 0
SHORTNESS OF BREATH: 0
BLOOD IN STOOL: 0

## 2020-06-04 NOTE — PROGRESS NOTES
#1, excision: 2 lymph nodes, negative for metastatic carcinoma    C.  [place] lymph node #2, excision: 2 lymph nodes, negative for metastatic carcinoma    D. Breast, right, oncoplastic reduction: Benign breast tissue with  fibrocystic change  Unremarkable skin    E.  Breast, right, lumpectomy: Invasive well-differentiated ductal   carcinoma (grade 1)  Ductal carcinoma in situ, intermediate nuclear grade  Lateral and posterior margins positive for invasive carcinoma  Ductal carcinoma in situ present less than 1 mm from lateral margin  Previous biopsy site identified  Fibrocystic change    F.  Right breast, additional posterior margin, excision: Fibrocystic  change  Negative for invasive carcinoma  Negative for ductal carcinoma in situ    G.  Right breast, additional medial margin, excision: Benign breast  tissue  Negative for invasive carcinoma  Negative for ductal carcinoma in situ    H.  Right breast, additional inferior margin, excision: Fibrocystic  change  Negative for invasive carcinoma  Negative for ductal carcinoma in situ    CANCER CASE SUMMARY  Procedure: Excision  Specimen laterality: Right  Tumor size: 1.2 x 1 x 0.7 cm (greatest dimension measured  microscopically)  Histologic type:  Invasive carcinoma of no special type (ductal, not  otherwise specified)  Histologic grade ([place] histologic score)       Tubular differentiation: Score 1       Nuclear pleomorphism: Score 2       Mitotic rate: Score 1       Overall grade: Grade 1 (score 4)  Ductal carcinoma in situ: Present, negative for extensive intraductal  component       Nuclear grade: Grade II (intermediate)  Lobular carcinoma in situ: Not identified  Margins: Invasive carcinoma present at lateral/orange inked margin   Final posterior margin negative for carcinoma   Ductal carcinoma in situ present less than 1 mm from lateral/orange  inked margin  Treatment effect: No known presurgical therapy  Pathologic stage classification (pTNM, AJCC 8th Edition): pT1c (sn)pN0 [0  out of 4 lymph nodes]     ER positive >90%  TN positive   80%  HER/2 Negative (1+)     Oncotype DX Breast Cancer Report-Node Negative   Recurrence Score Result-15   Risk category-low risk     On 07/17/2018: RIGHT breast, re-excision: Organizational changes of prior biopsy, no  evidence of residual neoplasm.  Margins of excision are negative for neoplasm.     Per Medical Oncology, Dr. Hazel Alfredo:  Given low score Oncotype Dx (her score is even less than 16 per [employer] data published in ASCO 2018), No indication and No Benefit for adjuvant chemotherapy. Proceed with adjuvant RT followed by hormonal therapy     RT was completed on 10/01/2018. ET:  Tamoxifen 20 mg po daily was started on 10/02/2018 with fair tolerance. Robotic TLH/BSO 11/15/2018 by Dr. Valerie Bass at [facility]; Path negative for malignancy.     D/C Tamoxifen. -Arimidex 1 mg po daily was started on 01/07/2019 where she complained of significant myalgias. D/C Arimidex.   -Femara 2.5 mg po daily was started on 02/04/2019 which she tolerated it poorly as well. Significant arthralgias affecting quality of life. D/C Femara.   -Tamoxifen resumed on 03/04/2019 per patient request.  -She stopped the Tamoxifen due to poor tolerance. Declining further ET.        Past Medical History:   Diagnosis Date    Arthritis     Cancer ([facility] [address] 2018    breast cancer--right breast, ER/TN+ Her2-    Necrotizing granulomatous inflammation of lung (HCC)     states necrotizing granulomatous of the abdomen, not the lung     Past Surgical History:   Procedure Laterality Date    ABDOMEN SURGERY      laparotomy    BREAST REDUCTION SURGERY Bilateral 7/3/2018    RIGHT ONCOPLASTIC BREAST REDUCTION, MATCHING LEFT BREAST REDUCTION performed by David Deng MD at [address]  11/15/2018    abdominal, Laparoscopic    HYSTERECTOMY, VAGINAL  11/15/2018    BSO    KNEE ARTHROSCOPY Left     LYMPH NODE BIOPSY on file     Physically abused: Not on file     Forced sexual activity: Not on file   Other Topics Concern    Not on file   Social History Narrative    Lives in [place] alone, family nearby. Works for [employer]. Allergies   Allergen Reactions    Ultram [Tramadol Hcl]      confused     Current Outpatient Medications on File Prior to Visit   Medication Sig Dispense Refill    traZODone (DESYREL) 100 MG tablet Take 100 mg by mouth nightly       No current facility-administered medications on file prior to visit. Review of Systems   Constitutional: Negative for activity change, appetite change, chills, fatigue, fever and unexpected weight change. She continues to do well. Works from home in [employer]. HENT: Negative for congestion, postnasal drip, rhinorrhea, sinus pressure, sinus pain, sore throat, trouble swallowing and voice change. Eyes: Negative for discharge, itching and visual disturbance. Respiratory: Negative for cough, choking, chest tightness, shortness of breath and wheezing. Cardiovascular: Negative for chest pain, palpitations and leg swelling. Gastrointestinal: Negative for abdominal distention, abdominal pain, blood in stool, constipation, diarrhea, nausea and vomiting. Endocrine: Negative for cold intolerance and heat intolerance. Genitourinary: Negative for difficulty urinating, dysuria, frequency and hematuria. Musculoskeletal: Negative for arthralgias, back pain, gait problem, joint swelling, myalgias, neck pain and neck stiffness. Allergic/Immunologic: Negative for environmental allergies and food allergies. Neurological: Negative for dizziness, seizures, syncope, speech difficulty, weakness, light-headedness and headaches. Hematological: Negative for adenopathy. Does not bruise/bleed easily. Psychiatric/Behavioral: Negative for agitation, confusion and decreased concentration.  The patient is nervous/anxious (She feels lump in left breast has increased in size. ). Objective:   Physical Exam  Vitals signs and nursing note reviewed. Constitutional:       General: She is not in acute distress. Appearance: She is well-developed. She is not diaphoretic. Comments: ECOG remains stable at 0   HENT:      Head: Normocephalic and atraumatic. Mouth/Throat:      Pharynx: No oropharyngeal exudate. Eyes:      General: No scleral icterus. Right eye: No discharge. Left eye: No discharge. Conjunctiva/sclera: Conjunctivae normal.   Neck:      Musculoskeletal: Normal range of motion and neck supple. Thyroid: No thyromegaly. Vascular: No JVD. Trachea: No tracheal deviation. Cardiovascular:      Rate and Rhythm: Normal rate and regular rhythm. Heart sounds: Normal heart sounds. No murmur. No friction rub. No gallop. Pulmonary:      Effort: Pulmonary effort is normal. No respiratory distress or retractions. Breath sounds: Normal breath sounds. No stridor. No wheezing (Faint terminal wheeze) or rales. Chest:      Chest wall: No mass, lacerations, deformity, swelling, tenderness or edema. Breasts: Breasts are symmetrical.         Right: No inverted nipple, mass, nipple discharge, skin change or tenderness. Left: No inverted nipple, mass, nipple discharge, skin change or tenderness. Abdominal:      General: There is no distension. Palpations: Abdomen is soft. Tenderness: There is no abdominal tenderness. There is no guarding or rebound. Musculoskeletal: Normal range of motion. General: No tenderness or deformity. Right shoulder: Normal.      Left shoulder: Normal.   Lymphadenopathy:      Cervical: No cervical adenopathy. Right cervical: No superficial, deep or posterior cervical adenopathy. Left cervical: No superficial, deep or posterior cervical adenopathy. Upper Body:      Right upper body: No pectoral adenopathy.       Left upper body: No contrast is advised in   6 months.       BIRADS 3: Probably benign finding                   4/8/19   Bilateral Diagnostic Mammogram   [facility]       TISSUE DENSITY:   There are scattered fibroglandular densities (Type 2 density).       MAMMOGRAM FINDINGS:   Finding 1:   There is a stable area of post-lumpectomy change seen in the right breast.       No suspicious masses, areas of suspicious architectural distortion, suspicious calcifications, or additional suspicious findings are identified.       IMPRESSION:   Stable area of post-lumpectomy change in the right breast. No evidence of malignancy.       Screening mammogram at age 37 is recommended.       =======================================   BI-RADS Category 2:  Benign   =======================================       4/8/19  Ultrasound left breast   [facility]       There is a focal area of atypical echogenicity seen in the left breast at the 1:00 position at palpable area.  This is also a site of postsurgical scar.       No discrete mass or loculated fluid collection.       IMPRESSION:       Focal area of atypical echogenicity in the left breast is seen adjacent to site of recent surgery and most likely represents area of fat necrosis.  No evidence of mass or architectural distortion on mammogram. No evidence of discrete mass or loculated    fluid collection at this location on ultrasound.  Clinical follow-up may be helpful for further evaluation.       Screening mammogram at age 37 is recommended.       =======================================   BI-RADS Category 2:  Benign   =======================================       Bilateral Diagnostic Mammogram 05/03/2018: A spiculated mass is seen in the upper outer quadrant of the right breast in approximately the 11 o'clock position underlying the marked area of palpable concern.   The spiculation surrounding the mass overall measures 3 cm diameter by mammography.      CXR PA/Lateral on 05/17/2018:  No focal consolidation     Right axillary U/S on 05/17/2018: There is no axillary lymphadenopathy.     MRI Bilateral Breast 05/30/2018:  A 1.3 x 0.8 x 1.1 cm spiculated, enhancing mass is identified in the right breast 12:00 with associated artifact from a metallic biopsy clip. No suspicious mass or non-mass enhancement seen on the left. There is no lymphadenopathy. Bilateral skin and nipple areolar complexes are normal. Visualized portions of the chest and abdomen are unremarkable.      Right breast blue dye injection, lumpectomy, sentinel lymph node excision was performed on 07/03/2018:  A.  Breast, left, reduction mammoplasty: Benign breast tissue with fibrocystic change  Unremarkable skin    B.  [place] lymph node #1, excision: 2 lymph nodes, negative for metastatic carcinoma    C.  [place] lymph node #2, excision: 2 lymph nodes, negative for metastatic carcinoma    D. Breast, right, oncoplastic reduction: Benign breast tissue with  fibrocystic change  Unremarkable skin    E.  Breast, right, lumpectomy: Invasive well-differentiated ductal   carcinoma (grade 1)  Ductal carcinoma in situ, intermediate nuclear grade  Lateral and posterior margins positive for invasive carcinoma  Ductal carcinoma in situ present less than 1 mm from lateral margin  Previous biopsy site identified  Fibrocystic change    F.  Right breast, additional posterior margin, excision: Fibrocystic  change  Negative for invasive carcinoma  Negative for ductal carcinoma in situ    G.  Right breast, additional medial margin, excision: Benign breast  tissue  Negative for invasive carcinoma  Negative for ductal carcinoma in situ    H.  Right breast, additional inferior margin, excision: Fibrocystic  change  Negative for invasive carcinoma  Negative for ductal carcinoma in situ    CANCER CASE SUMMARY  Procedure: Excision  Specimen laterality: Right  Tumor size: 1.2 x 1 x 0.7 cm (greatest dimension measured  microscopically)  Histologic type:  Invasive carcinoma of no special type (ductal, not  otherwise specified)  Histologic grade ([place] histologic score)       Tubular differentiation: Score 1       Nuclear pleomorphism: Score 2       Mitotic rate: Score 1       Overall grade: Grade 1 (score 4)  Ductal carcinoma in situ: Present, negative for extensive intraductal  component       Nuclear grade: Grade II (intermediate)  Lobular carcinoma in situ: Not identified  Margins: Invasive carcinoma present at lateral/orange inked margin   Final posterior margin negative for carcinoma   Ductal carcinoma in situ present less than 1 mm from lateral/orange  inked margin  Treatment effect: No known presurgical therapy  Pathologic stage classification (pTNM, AJCC 8th Edition): pT1c (sn)pN0 [0  out of 4 lymph nodes]     ER positive >90%  PA positive   80%  HER/2 Negative (1+)     Oncotype DX Breast Cancer Report-Node Negative   Recurrence Score Result-15 (low risk).       On 07/17/2018: RIGHT breast, re-excision: Organizational changes of prior biopsy, no  evidence of residual neoplasm.  Margins of excision are negative for neoplasm.     Per Medical Oncology, Dr. Jossue Cee:  Given low score Oncotype Dx (her score is even less than 16 per [employer] data published in ASCO 2018), No indication and No Benefit for adjuvant chemotherapy. Proceed with adjuvant RT followed by hormonal therapy     RT was completed on 10/01/2018. ET:  Tamoxifen 20 mg po daily was started on 10/02/2018 with fair tolerance. Robotic TLH/BSO 11/15/2018 by Dr. Terrance Anderson at [facility]; Path negative for malignancy.     D/C Tamoxifen. -Arimidex 1 mg po daily was started on 01/07/2019 where she complained of significant myalgias. D/C Arimidex.   -Femara 2.5 mg po daily was started on 02/04/2019 which she tolerated it poorly as well. Significant arthralgias affecting quality of life.  D/C Femara.   -Tamoxifen resumed on 03/04/2019 per patient request   -She has discontinued the Tamoxifen and

## 2020-06-09 ENCOUNTER — HOSPITAL ENCOUNTER (OUTPATIENT)
Dept: GENERAL RADIOLOGY | Age: 40
Discharge: HOME | End: 2020-06-11
Payer: COMMERCIAL

## 2020-06-09 ENCOUNTER — OFFICE VISIT (OUTPATIENT)
Dept: BREAST CENTER | Age: 40
End: 2020-06-09
Payer: COMMERCIAL

## 2020-06-09 VITALS
RESPIRATION RATE: 18 BRPM | TEMPERATURE: 97.9 F | OXYGEN SATURATION: 98 % | HEIGHT: 62 IN | BODY MASS INDEX: 27.42 KG/M2 | WEIGHT: 149 LBS | HEART RATE: 73 BPM | DIASTOLIC BLOOD PRESSURE: 52 MMHG | SYSTOLIC BLOOD PRESSURE: 102 MMHG

## 2020-06-09 PROCEDURE — 99213 OFFICE O/P EST LOW 20 MIN: CPT | Performed by: NURSE PRACTITIONER

## 2020-06-09 PROCEDURE — 77063 BREAST TOMOSYNTHESIS BI: CPT

## 2020-06-18 ENCOUNTER — TELEPHONE (OUTPATIENT)
Dept: BREAST CENTER | Age: 40
End: 2020-06-18

## 2020-07-01 ENCOUNTER — TELEPHONE (OUTPATIENT)
Dept: BREAST CENTER | Age: 40
End: 2020-07-01

## 2020-07-01 NOTE — TELEPHONE ENCOUNTER
Authorization obtained from [facility] [address] / [facility] for breast MRI [id number]  -- Approval # [id number] valid until 7/23/20

## 2020-07-07 ENCOUNTER — HOSPITAL ENCOUNTER (OUTPATIENT)
Dept: MRI IMAGING | Age: 40
Discharge: HOME | End: 2020-07-09
Payer: COMMERCIAL

## 2020-07-07 PROCEDURE — A9585 GADOBUTROL INJECTION: HCPCS | Performed by: RADIOLOGY

## 2020-07-07 PROCEDURE — 6360000004 HC RX CONTRAST MEDICATION: Performed by: RADIOLOGY

## 2020-07-07 PROCEDURE — 77049 MRI BREAST C-+ W/CAD BI: CPT

## 2020-07-07 RX ADMIN — GADOBUTROL 6 ML: 604.72 INJECTION INTRAVENOUS at 13:49

## 2020-07-08 ENCOUNTER — TELEPHONE (OUTPATIENT)
Dept: BREAST CENTER | Age: 40
End: 2020-07-08

## 2020-07-08 NOTE — TELEPHONE ENCOUNTER
Discussed results of breast MRI, benign. Reviewed results and plan with NP. Patient scheduled for a 6 month follow up. Imaging due June 2020.

## 2020-11-09 ENCOUNTER — OFFICE VISIT (OUTPATIENT)
Dept: ONCOLOGY | Age: 40
End: 2020-11-09
Payer: COMMERCIAL

## 2020-11-09 ENCOUNTER — HOSPITAL ENCOUNTER (OUTPATIENT)
Dept: INFUSION THERAPY | Age: 40
Discharge: HOME | End: 2020-11-09
Payer: COMMERCIAL

## 2020-11-09 VITALS
OXYGEN SATURATION: 99 % | DIASTOLIC BLOOD PRESSURE: 68 MMHG | WEIGHT: 164 LBS | BODY MASS INDEX: 30.18 KG/M2 | SYSTOLIC BLOOD PRESSURE: 118 MMHG | HEIGHT: 62 IN | TEMPERATURE: 97.6 F | HEART RATE: 79 BPM

## 2020-11-09 LAB
ALBUMIN SERPL-MCNC: 4 G/DL (ref 3.5–5.2)
ALP BLD-CCNC: 142 U/L (ref 35–104)
ALT SERPL-CCNC: 6 U/L (ref 0–32)
ANION GAP SERPL CALCULATED.3IONS-SCNC: 8 MMOL/L (ref 7–16)
AST SERPL-CCNC: 21 U/L (ref 0–31)
BASOPHILS ABSOLUTE: 0.04 E9/L (ref 0–0.2)
BASOPHILS RELATIVE PERCENT: 0.4 % (ref 0–2)
BILIRUB SERPL-MCNC: 0.5 MG/DL (ref 0–1.2)
BUN BLDV-MCNC: 9 MG/DL (ref 6–20)
CALCIUM SERPL-MCNC: 9.2 MG/DL (ref 8.6–10.2)
CHLORIDE BLD-SCNC: 107 MMOL/L (ref 98–107)
CO2: 26 MMOL/L (ref 22–29)
CREAT SERPL-MCNC: 0.7 MG/DL (ref 0.5–1)
EOSINOPHILS ABSOLUTE: 0.06 E9/L (ref 0.05–0.5)
EOSINOPHILS RELATIVE PERCENT: 0.6 % (ref 0–6)
GFR AFRICAN AMERICAN: >60
GFR NON-AFRICAN AMERICAN: >60 ML/MIN/1.73
GLUCOSE BLD-MCNC: 95 MG/DL (ref 74–99)
HCT VFR BLD CALC: 37.5 % (ref 34–48)
HEMOGLOBIN: 12.1 G/DL (ref 11.5–15.5)
IMMATURE GRANULOCYTES #: 0.03 E9/L
IMMATURE GRANULOCYTES %: 0.3 % (ref 0–5)
LYMPHOCYTES ABSOLUTE: 2.66 E9/L (ref 1.5–4)
LYMPHOCYTES RELATIVE PERCENT: 28.1 % (ref 20–42)
MCH RBC QN AUTO: 30.1 PG (ref 26–35)
MCHC RBC AUTO-ENTMCNC: 32.3 % (ref 32–34.5)
MCV RBC AUTO: 93.3 FL (ref 80–99.9)
MONOCYTES ABSOLUTE: 0.57 E9/L (ref 0.1–0.95)
MONOCYTES RELATIVE PERCENT: 6 % (ref 2–12)
NEUTROPHILS ABSOLUTE: 6.11 E9/L (ref 1.8–7.3)
NEUTROPHILS RELATIVE PERCENT: 64.6 % (ref 43–80)
PDW BLD-RTO: 13.6 FL (ref 11.5–15)
PLATELET # BLD: 410 E9/L (ref 130–450)
PMV BLD AUTO: 9.9 FL (ref 7–12)
POTASSIUM SERPL-SCNC: 3.2 MMOL/L (ref 3.5–5)
RBC # BLD: 4.02 E12/L (ref 3.5–5.5)
SODIUM BLD-SCNC: 141 MMOL/L (ref 132–146)
TOTAL PROTEIN: 7.1 G/DL (ref 6.4–8.3)
WBC # BLD: 9.5 E9/L (ref 4.5–11.5)

## 2020-11-09 PROCEDURE — 85025 COMPLETE CBC W/AUTO DIFF WBC: CPT

## 2020-11-09 PROCEDURE — 80053 COMPREHEN METABOLIC PANEL: CPT

## 2020-11-09 PROCEDURE — 99214 OFFICE O/P EST MOD 30 MIN: CPT | Performed by: INTERNAL MEDICINE

## 2020-11-09 NOTE — PROGRESS NOTES
Department of [facility] Oncology  Attending Clinic Note    Reason for Visit:  Follow-up on a patient with Right Breast Cancer    PCP:  Bell Grimaldo MD    History of Present Illness: The mass was located in the 12 o'clock position of right breast    Breast cancer risk factors include family hx on father's side, delayed child bearing, menarche before age 16, family hx of ovarian CA and gender. Vertie Sleek of menarche was 7. Last menstrual period was Mid month April. Patient has hormonal therapy, recent Depo, has been on OCPs or similar since 19y until 36y. Patient is Waverly Sayer denies nipple discharge    Bilateral Diagnostic Mammogram 05/03/2018: A spiculated mass is seen in the upper outer quadrant of the right breast in approximately the 11 o'clock position underlying the marked area of palpable concern. The spiculation surrounding the mass overall measures 3 cm diameter by mammography. Right Breast Ultrasound on 05/03/2018: Taller than wide macro-lobulated and micro-lobulated mass with posterior shadowing and increased blood flow in the 11 o'clock position of the right breast, 10 cm from the nipple measuring 10 x 9 x 9 mm with surrounding spiculation and retraction of connective tissue. On 05/09/2018:  Right breast, 12:00 core needle biopsy: Invasive, well-differentiated ductal carcinoma (grade 1)  Comment: Focal low-grade DCIS is also noted.  Calponin and p63 immunostains show a lack of myoepithelial layer which supports the interpretation.  The tumor has a Spalding score of 2 (tubule formation) +2 (nuclear pleomorphism) +1 (mitotic count) = 5. ER positive >90%  [place] positive   80%  HER/2 Negative (1+)    CXR PA/Lateral on 05/17/2018:  No focal consolidation    Right axillary U/S on 05/17/2018: There is no axillary lymphadenopathy.     MRI Bilateral Breast 05/30/2018:  A 1.3 x 0.8 x 1.1 cm spiculated, enhancing mass is identified in the right breast 12:00 with associated artifact from a metallic biopsy clip. No suspicious mass or non-mass enhancement seen on the left. There is no lymphadenopathy. Bilateral skin and nipple areolar complexes are normal. Visualized portions of the chest and abdomen are unremarkable. Right breast blue dye injection, lumpectomy, sentinel lymph node excision was performed on 07/03/2018:  A.  Breast, left, reduction mammoplasty: Benign breast tissue with fibrocystic change  Unremarkable skin    B.  [place] lymph node #1, excision: 2 lymph nodes, negative for metastatic carcinoma    C.  [place] lymph node #2, excision: 2 lymph nodes, negative for metastatic carcinoma    D. Breast, right, oncoplastic reduction: Benign breast tissue with  fibrocystic change  Unremarkable skin    E.  Breast, right, lumpectomy: Invasive well-differentiated ductal   carcinoma (grade 1)  Ductal carcinoma in situ, intermediate nuclear grade  Lateral and posterior margins positive for invasive carcinoma  Ductal carcinoma in situ present less than 1 mm from lateral margin  Previous biopsy site identified  Fibrocystic change    F.  Right breast, additional posterior margin, excision: Fibrocystic  change  Negative for invasive carcinoma  Negative for ductal carcinoma in situ    G.  Right breast, additional medial margin, excision: Benign breast  tissue  Negative for invasive carcinoma  Negative for ductal carcinoma in situ    H.  Right breast, additional inferior margin, excision: Fibrocystic  change  Negative for invasive carcinoma  Negative for ductal carcinoma in situ    CANCER CASE SUMMARY  Procedure: Excision  Specimen laterality: Right  Tumor size: 1.2 x 1 x 0.7 cm (greatest dimension measured  microscopically)  Histologic type:  Invasive carcinoma of no special type (ductal, not  otherwise specified)  Histologic grade ([place] histologic score)       Tubular differentiation: Score 1       Nuclear pleomorphism: Score 2       Mitotic rate: Score 1       Overall grade: Grade 1 (score 4)  Ductal carcinoma in situ: Present, negative for extensive intraductal  component       Nuclear grade: Grade II (intermediate)  Lobular carcinoma in situ: Not identified  Margins: Invasive carcinoma present at lateral/orange inked margin   Final posterior margin negative for carcinoma   Ductal carcinoma in situ present less than 1 mm from lateral/orange  inked margin  Treatment effect: No known presurgical therapy  Pathologic stage classification (pTNM, AJCC 8th Edition): pT1c (sn)pN0 [0  out of 4 lymph nodes]    ER positive >90%  ND positive   80%  HER/2 Negative (1+)    Oncotype DX Breast Cancer Report-Node Negative   Recurrence Score Result-15   Risk category-low risk    On 07/17/2018: Left breast, re-excision: Organizational changes of prior biopsy, no  evidence of residual neoplasm.  Margins of excision are negative for neoplasm. Given low score Oncotype Dx (her score is even less than 16 per [employer] data published in ASCO 2018), No indication and No Benefit for adjuvant chemotherapy. Proceed with adjuvant RT followed by hormonal therapy    RT was completed on 10/01/2018. Tamoxifen 20 mg po daily was started on 10/02/2018 with fair tolerance. Robotic TLH/BSO 11/15/2018 by Dr. Ignacia Lyons at [facility]; Path negative for malignancy. D/C Tamoxifen. We recommended Arimidex 1 mg po daily. Side effects of Arimidex reviewed with patient. She agreed to proceed. Ca/VitD while on Arimidex. Arimidex 1 mg po daily was started on 01/07/2019 where she complained of significant myalgias. D/C Arimidex. Recommended Femara instead. Femara 2.5 mg po daily was started on 02/04/2019 which she tolerated it poorly as well. Significant arthralgias affecting quality of life. D/C Femara. Patient wanted to go back on Tamoxifen. Tamoxifen 20 mg po daily was re-started on 03/04/2019 with poor tolerance (significant arthralgias affecting quality of life). D/C Tamoxifen. We recommended Exemestane 25 mg po daily.  Side effects of Exemestane reviewed with patient. She agreed to proceed. Exemestane 25 mg po daily was started on 04/08/2019 she also tolerated poorly (significant arthralgias affecting quality of life). Patient d/c Exemestane and wanted to be observation only. She presents today 11/09/2020 for observation. Denies fever, chills, chest pain, SOB. Denies any changes to breast.     Review of Systems;  CONSTITUTIONAL: No fever, chills. Fair appetite and energy level. ENMT: Eyes: No diplopia; Nose: No epistaxis. Mouth: No sore throat. RESPIRATORY: No hemoptysis, shortness of breath, cough. CARDIOVASCULAR: No chest pain, palpitations. GASTROINTESTINAL: No nausea/vomiting, diarrhea/constipation. GENITOURINARY: No dysuria, urinary frequency, hematuria. NEURO: No syncope, presyncope, headache. Remainder:  ROS NEGATIVE    Past Medical History:      Diagnosis Date    Arthritis     Cancer ([facility] [address] 2018    breast cancer--right breast, ER/CA+ Her2-    Necrotizing granulomatous inflammation of lung (HCC)     states necrotizing granulomatous of the abdomen, not the lung     Medications:  Reviewed and reconciled. Allergies: Allergies   Allergen Reactions    Ultram [Tramadol Hcl]      confused     Physical Exam:  /68 (Site: Right Upper Arm, Position: Sitting, Cuff Size: Medium Adult)   Pulse 79   Temp 97.6 °F (36.4 °C) (Temporal)   Ht 5' 2\" (1.575 m)   Wt 164 lb (74.4 kg)   LMP 05/16/2018 (LMP Unknown)   SpO2 99%   BMI 30.00 kg/m²   GENERAL: Alert, oriented x 3, not in acute distress. HEENT: PERRLA; EOMI. Oropharynx clear. NECK: Supple. Without lymphadenopathy. LUNGS: Good air entry bilaterally. No wheezing, crackles or ronchi. Breasts: Incision intact. No palpable masses or LN. CARDIOVASCULAR: Regular rate. No murmurs, rubs or gallops. ABDOMEN: Soft. non-distended. Positive bowel sounds. EXTREMITIES: Without clubbing, cyanosis, or edema. NEUROLOGIC: No focal deficits.    ECOG PS 1    Impression/Plan:  45 y.o. female who underwent Right breast blue dye injection, lumpectomy, sentinel lymph node excision on 07/03/2018:  A.  Breast, left, reduction mammoplasty: Benign breast tissue with fibrocystic change  Unremarkable skin    B.  [place] lymph node #1, excision: 2 lymph nodes, negative for metastatic carcinoma    C.  [place] lymph node #2, excision: 2 lymph nodes, negative for metastatic carcinoma    D. Breast, right, oncoplastic reduction: Benign breast tissue with fibrocystic change  Unremarkable skin    E.  Breast, right, lumpectomy: Invasive well-differentiated ductal carcinoma (grade 1)  Ductal carcinoma in situ, intermediate nuclear grade  Lateral and posterior margins positive for invasive carcinoma  Ductal carcinoma in situ present less than 1 mm from lateral margin  Previous biopsy site identified  Fibrocystic change    F.  Right breast, additional posterior margin, excision: Fibrocystic change  Negative for invasive carcinoma  Negative for ductal carcinoma in situ    G.  Right breast, additional medial margin, excision: Benign breast tissue  Negative for invasive carcinoma  Negative for ductal carcinoma in situ    H.  Right breast, additional inferior margin, excision: Fibrocystic change  Negative for invasive carcinoma  Negative for ductal carcinoma in situ    CANCER CASE SUMMARY  Procedure: Excision  Specimen laterality: Right  Tumor size: 1.2 x 1 x 0.7 cm (greatest dimension measured microscopically)  Histologic type:  Invasive carcinoma of no special type (ductal, not otherwise specified)  Histologic grade ([place] histologic score)       Tubular differentiation: Score 1       Nuclear pleomorphism: Score 2       Mitotic rate: Score 1       Overall grade: Grade 1 (score 4)  Ductal carcinoma in situ: Present, negative for extensive intraductal component       Nuclear grade: Grade II (intermediate)  Lobular carcinoma in situ: Not identified  Margins: Invasive carcinoma present she also tolerated poorly (significant arthralgias affecting quality of life). Patient d/c Exemestane and wanted to be observation only. Bilateral Diagnostic Mammogram/Left Breast 04/08/2019 negative for malignancy. Bilateral Breast MRI 10/24/2019: Linear non mass enhancement in the upper outer left breast is probably benign. It is likely due to postsurgical changes/fat necrosis. No evidence of neoplasm on the right. Left Breast US 10/30/2019: There is an oval solid mass measuring 6 mm seen in the left breast. Internal echogenicity is isoechoic. The finding shows no significant increase in vascularity. Left Breast Ultrasound Core Needle Biopsy 10/30/2019:   Left breast, 3 o'clock position, core biopsy: Fat necrosis with foreign body reaction and focal calcification. Negative for epithelial hyperplasia or neoplasia. Bilateral Screening Mammogram 06/09/2020 No mammographic evidence of malignancy. MRI Bilateral Breast 07/07/2020 no convincing evidence of neoplasm bilaterally. NILDA. Patient wants to continue on observation only. RTC in 6 months.     Su Mora MD   [id number]  Board Certified Medical Oncologist   Board Certified Internal Medicine

## 2020-11-10 ENCOUNTER — TELEPHONE (OUTPATIENT)
Dept: PHARMACY | Age: 40
End: 2020-11-10

## 2020-12-30 ASSESSMENT — ENCOUNTER SYMPTOMS
CHEST TIGHTNESS: 0
SINUS PRESSURE: 0
SHORTNESS OF BREATH: 0
BACK PAIN: 0
BLOOD IN STOOL: 0
RHINORRHEA: 0
VOMITING: 0
TROUBLE SWALLOWING: 0
SINUS PAIN: 0
ABDOMINAL DISTENTION: 0
COUGH: 0
VOICE CHANGE: 0
NAUSEA: 0
ABDOMINAL PAIN: 0
EYE DISCHARGE: 0
EYE ITCHING: 0
CONSTIPATION: 0
DIARRHEA: 0
CHOKING: 0
SORE THROAT: 0
WHEEZING: 0

## 2020-12-30 NOTE — PROGRESS NOTES
Subjective:  Right breast, 12:00:  Invasive, well-differentiated ductal carcinoma (grade 1)  Comment: Focal low-grade DCIS is also noted.  Calponin and p63 immunostains show a lack of myoepithelial layer which supports the interpretation.  The tumor has a [place] score of 2 (tubule formation) +2 (nuclear pleomorphism) +1 (mitotic count) = 5. ER positive >90%  MD positive   80%  HER/2 Negative (1+)  -Oncotype Dx recurrence score: 15  -RT was completed on 10/01/2018.  -ET:  Tamoxifen 20 mg po daily was started on 10/02/2018. She has tried multiple therapies and in 2019 opted for observation only. Some elements of all sections below were copied from my previous note 10/30/19 and have been re-examined and updated where appropriate. All elements reflect the medical decision making of today 2020    Patient ID: Yolanda Barajas is a 45 y.o. female. 1980    Follow up for invasive ductal carcinoma, right breast.    PCP:  Darío Mckenzie MD,    HPI   The mass was located in the 12 o'clock position of right breast     Breast cancer risk factors include family hx on father's side, delayed child bearing, menarche before age 16, family hx of ([place])ovarian CA and gender. Dorette Mary of menarche was 7. Last menstrual period was Mid month April. Patient has hormonal therapy, recent Depo, has been on OCPs or similar since 19y until 36y. Patient is .  Patient denies nipple discharge     10/24/19  Bilateral Breast MRI   [facility]        INDICATION: Z85.3 Personal history of breast cancer    personal history of, right breast cancer, high risk breast cancer,   post lumpectomy 7/3/18         FINDINGS:   There is heterogeneous fibroglandular tissue with minimal background   parenchymal enhancement. Bilateral postsurgical changes noted. No   suspicious mass or non-mass enhancement seen on the right.  Linear,   heterogeneous non mass enhancement identified in the upper outer left   breast anterior depth which measures 1.1 cm in AP dimension (image 50   of the axial T1 postcontrast series). There is no lymphadenopathy. Bilateral skin and nipple areolar complexes are normal. Visualized   portions of the chest and abdomen are unremarkable.           Impression       1. Linear non mass enhancement in the upper outer left breast is   probably benign. It is likely due to postsurgical changes/fat   necrosis.       2.  No evidence of neoplasm on the right.       RECOMMENDATION:   Short-term follow-up bilateral breast MRI with contrast is advised in   6 months.       BIRADS 3: Probably benign finding                   4/8/19   Bilateral Diagnostic Mammogram   [facility]       TISSUE DENSITY:   There are scattered fibroglandular densities (Type 2 density).       MAMMOGRAM FINDINGS:   Finding 1:   There is a stable area of post-lumpectomy change seen in the right breast.       No suspicious masses, areas of suspicious architectural distortion, suspicious calcifications, or additional suspicious findings are identified.       IMPRESSION:   Stable area of post-lumpectomy change in the right breast. No evidence of malignancy.       Screening mammogram at age 37 is recommended.       =======================================   BI-RADS Category 2:  Benign   =======================================       4/8/19  Ultrasound left breast   [facility]       There is a focal area of atypical echogenicity seen in the left breast at the 1:00 position at palpable area.  This is also a site of postsurgical scar.       No discrete mass or loculated fluid collection.       IMPRESSION:       Focal area of atypical echogenicity in the left breast is seen adjacent to site of recent surgery and most likely represents area of fat necrosis.  No evidence of mass or architectural distortion on mammogram. No evidence of discrete mass or loculated    fluid collection at this location on ultrasound.  Clinical follow-up may be helpful for further evaluation.     Screening mammogram at age 37 is recommended.       =======================================   BI-RADS Category 2:  Benign   =======================================       Bilateral Diagnostic Mammogram 05/03/2018: A spiculated mass is seen in the upper outer quadrant of the right breast in approximately the 11 o'clock position underlying the marked area of palpable concern. The spiculation surrounding the mass overall measures 3 cm diameter by mammography.      Right Breast Ultrasound on 05/03/2018: Taller than wide macro-lobulated and micro-lobulated mass with posterior shadowing and increased blood flow in the 11 o'clock position of the right breast, 10 cm from the nipple measuring 10 x 9 x 9 mm with surrounding spiculation and retraction of connective tissue.     On 05/09/2018:  Right breast, 12:00 core needle biopsy: Invasive, well-differentiated ductal carcinoma (grade 1)  Comment: Focal low-grade DCIS is also noted.  Calponin and p63 immunostains show a lack of myoepithelial layer which supports the interpretation.  The tumor has a [place] score of 2 (tubule formation) +2 (nuclear pleomorphism) +1 (mitotic count) = 5. ER positive >90%  ID positive   80%  HER/2 Negative (1+)     CXR PA/Lateral on 05/17/2018:  No focal consolidation     Right axillary U/S on 05/17/2018: There is no axillary lymphadenopathy.     MRI Bilateral Breast 05/30/2018:  A 1.3 x 0.8 x 1.1 cm spiculated, enhancing mass is identified in the right breast 12:00 with associated artifact from a metallic biopsy clip. No suspicious mass or non-mass enhancement seen on the left. There is no lymphadenopathy.  Bilateral skin and nipple areolar complexes are normal. Visualized portions of the chest and abdomen are unremarkable.      Right breast blue dye injection, lumpectomy, sentinel lymph node excision was performed on 07/03/2018:  A.  Breast, left, reduction mammoplasty: Benign breast tissue with fibrocystic change  Unremarkable skin    B.  [employer] lymph node #1, excision: 2 lymph nodes, negative for metastatic carcinoma    C.  [employer] lymph node #2, excision: 2 lymph nodes, negative for metastatic carcinoma    D. Breast, right, oncoplastic reduction: Benign breast tissue with  fibrocystic change  Unremarkable skin    E.  Breast, right, lumpectomy: Invasive well-differentiated ductal   carcinoma (grade 1)  Ductal carcinoma in situ, intermediate nuclear grade  Lateral and posterior margins positive for invasive carcinoma  Ductal carcinoma in situ present less than 1 mm from lateral margin  Previous biopsy site identified  Fibrocystic change    F.  Right breast, additional posterior margin, excision: Fibrocystic  change  Negative for invasive carcinoma  Negative for ductal carcinoma in situ    G.  Right breast, additional medial margin, excision: Benign breast  tissue  Negative for invasive carcinoma  Negative for ductal carcinoma in situ    H.  Right breast, additional inferior margin, excision: Fibrocystic  change  Negative for invasive carcinoma  Negative for ductal carcinoma in situ    CANCER CASE SUMMARY  Procedure: Excision  Specimen laterality: Right  Tumor size: 1.2 x 1 x 0.7 cm (greatest dimension measured  microscopically)  Histologic type:  Invasive carcinoma of no special type (ductal, not  otherwise specified)  Histologic grade ([place] histologic score)       Tubular differentiation: Score 1       Nuclear pleomorphism: Score 2       Mitotic rate: Score 1       Overall grade: Grade 1 (score 4)  Ductal carcinoma in situ: Present, negative for extensive intraductal  component       Nuclear grade: Grade II (intermediate)  Lobular carcinoma in situ: Not identified  Margins: Invasive carcinoma present at lateral/orange inked margin   Final posterior margin negative for carcinoma   Ductal carcinoma in situ present less than 1 mm from lateral/orange  inked margin  Treatment effect: No known presurgical therapy  Pathologic stage classification (pTNM, AJCC 8th Edition): pT1c (sn)pN0 [0  out of 4 lymph nodes]     ER positive >90%  OH positive   80%  HER/2 Negative (1+)     Oncotype DX Breast Cancer Report-Node Negative   Recurrence Score Result-15   Risk category-low risk     On 07/17/2018: RIGHT breast, re-excision: Organizational changes of prior biopsy, no  evidence of residual neoplasm.  Margins of excision are negative for neoplasm.     Per Medical Oncology, Dr. Dominique Wolf:  Given low score Oncotype Dx (her score is even less than 16 per TAILORx data published in ASCO 2018), No indication and No Benefit for adjuvant chemotherapy. Proceed with adjuvant RT followed by hormonal therapy     RT was completed on 10/01/2018. ET:  Tamoxifen 20 mg po daily was started on 10/02/2018 with fair tolerance. Robotic TLH/BSO 11/15/2018 by Dr. Cyndi Saab at [facility]; Path negative for malignancy.     D/C Tamoxifen. -Arimidex 1 mg po daily was started on 01/07/2019 where she complained of significant myalgias. D/C Arimidex.   -Femara 2.5 mg po daily was started on 02/04/2019 which she tolerated it poorly as well. Significant arthralgias affecting quality of life. D/C Femara.   -Tamoxifen resumed on 03/04/2019 per patient request.  -She stopped the Tamoxifen due to poor tolerance. Declining further ET.        Past Medical History:   Diagnosis Date    Arthritis     Cancer ([facility] [address] 2018    breast cancer--right breast, ER/OH+ Her2-    Necrotizing granulomatous inflammation of lung (HCC)     states necrotizing granulomatous of the abdomen, not the lung     Past Surgical History:   Procedure Laterality Date    ABDOMEN SURGERY      laparotomy    BREAST REDUCTION SURGERY Bilateral 7/3/2018    RIGHT ONCOPLASTIC BREAST REDUCTION, MATCHING LEFT BREAST REDUCTION performed by Chayito Deal MD at [address]  11/15/2018    abdominal, Laparoscopic    HYSTERECTOMY, VAGINAL  11/15/2018    BSO    KNEE ARTHROSCOPY Left     LYMPH NODE BIOPSY      MI BIOPSY OF BREAST, NEEDLE CORE Right 7/3/2018    RIGHT BREAST NEEDLE LOCALIZATION LUMPECTOMY SENTINEL LYMPH NODE BIOPSY - TRIDENT AND NEOPROBE performed by Shelbi Kim MD at [address] [place] [address] Right 7/12/2018    RIGHTY AXILLARY  EXPLORATION WITH DRAIN PLACEMENT performed by Shelbi Kim MD at [facility] OFFICE/OUTPT VISIT,PROCEDURE ONLY Right 7/17/2018    RE-EXCISION OF RIGHT BREAST LUMPECTOMY LATERAL MARGIN ---DR. HUERTA -- REVISION BREAST REDUCTION FOLLOWING LUMPECTOMY RE-EXCISION performed by Shelbi Kim MD at [address] [place] [address] [facility]       Social History     Socioeconomic History    Marital status: Single     Spouse name: Not on file    Number of children: Not on file    Years of education: Not on file    Highest education level: Not on file   Occupational History    Not on file   Social Needs    Financial resource strain: Not on file    Food insecurity     Worry: Not on file     Inability: Not on file    Transportation needs     Medical: Not on file     Non-medical: Not on file   Tobacco Use    Smoking status: Current Every Day Smoker     Packs/day: 1.00     Years: 20.00     Pack years: 20.00     Types: Cigarettes    Smokeless tobacco: Never Used    Tobacco comment: [email or web address]    Substance and Sexual Activity    Alcohol use: Yes     Comment: ocassional    Drug use: No    Sexual activity: Yes     Partners: Male   Lifestyle    Physical activity     Days per week: Not on file     Minutes per session: Not on file    Stress: Not on file   Relationships    Social connections     Talks on phone: Not on file     Gets together: Not on file     Attends Denominational service: Not on file     Active member of club or organization: Not on file     Attends meetings of clubs or organizations: Not on file     Relationship status: Not on file    Intimate partner violence     Fear of current or ex partner: Not on file injury in the past.  Has limited ROM of RUE.). Left shoulder: Normal.   Lymphadenopathy:      Cervical: No cervical adenopathy. Right cervical: No superficial, deep or posterior cervical adenopathy. Left cervical: No superficial, deep or posterior cervical adenopathy. Upper Body:      Right upper body: No pectoral adenopathy. Left upper body: No pectoral adenopathy. Skin:     General: Skin is warm and dry. Coloration: Skin is not pale. Findings: No erythema or rash. Neurological:      Mental Status: She is alert and oriented to person, place, and time. Coordination: Coordination normal.   Psychiatric:         Behavior: Behavior normal.         Thought Content: Thought content normal.         Judgment: Judgment normal.        Assessment:     45 y.o. extremely pleasant female who on 05/09/2018 underwent Right breast, 12:00 core needle biopsy: Invasive, well-differentiated ductal carcinoma (grade 1)  Comment: Focal low-grade DCIS is also noted.  Calponin and p63 immunostains show a lack of myoepithelial layer which supports the interpretation.  The tumor has a [place] score of 2 (tubule formation) +2 (nuclear pleomorphism) +1 (mitotic count) = 5. ER positive >90%  GA positive   80%  HER/2 Negative (1+)     10/24/19  Bilateral Breast MRI   [facility]        INDICATION: Z85.3 Personal history of breast cancer    personal history of, right breast cancer, high risk breast cancer,   post lumpectomy 7/3/18         FINDINGS:   There is heterogeneous fibroglandular tissue with minimal background   parenchymal enhancement. Bilateral postsurgical changes noted. No   suspicious mass or non-mass enhancement seen on the right. Linear,   heterogeneous non mass enhancement identified in the upper outer left   breast anterior depth which measures 1.1 cm in AP dimension (image 50   of the axial T1 postcontrast series). There is no lymphadenopathy.    Bilateral skin and nipple areolar complexes are normal. Visualized   portions of the chest and abdomen are unremarkable.           Impression       1. Linear non mass enhancement in the upper outer left breast is   probably benign. It is likely due to postsurgical changes/fat   necrosis.       2.  No evidence of neoplasm on the right.       RECOMMENDATION:   Short-term follow-up bilateral breast MRI with contrast is advised in   6 months.       BIRADS 3: Probably benign finding                   4/8/19   Bilateral Diagnostic Mammogram   [facility]       TISSUE DENSITY:   There are scattered fibroglandular densities (Type 2 density).       MAMMOGRAM FINDINGS:   Finding 1:   There is a stable area of post-lumpectomy change seen in the right breast.       No suspicious masses, areas of suspicious architectural distortion, suspicious calcifications, or additional suspicious findings are identified.       IMPRESSION:   Stable area of post-lumpectomy change in the right breast. No evidence of malignancy.       Screening mammogram at age 37 is recommended.       =======================================   BI-RADS Category 2:  Benign   =======================================       4/8/19  Ultrasound left breast   [facility]:  Benign, BI-RADS 2       There is a focal area of atypical echogenicity seen in the left breast at the 1:00 position at palpable area.  This is also a site of postsurgical scar.       No discrete mass or loculated fluid collection.       IMPRESSION:       Focal area of atypical echogenicity in the left breast is seen adjacent to site of recent surgery and most likely represents area of fat necrosis.  No evidence of mass or architectural distortion on mammogram. No evidence of discrete mass or loculated    fluid collection at this location on ultrasound.  Clinical follow-up may be helpful for further evaluation.       Screening mammogram at age 37 is recommended.       =======================================   BI-RADS Category 2:  Benign =======================================       Bilateral Diagnostic Mammogram 05/03/2018: A spiculated mass is seen in the upper outer quadrant of the right breast in approximately the 11 o'clock position underlying the marked area of palpable concern. The spiculation surrounding the mass overall measures 3 cm diameter by mammography.      CXR PA/Lateral on 05/17/2018:  No focal consolidation     Right axillary U/S on 05/17/2018: There is no axillary lymphadenopathy.     MRI Bilateral Breast 05/30/2018:  A 1.3 x 0.8 x 1.1 cm spiculated, enhancing mass is identified in the right breast 12:00 with associated artifact from a metallic biopsy clip. No suspicious mass or non-mass enhancement seen on the left. There is no lymphadenopathy. Bilateral skin and nipple areolar complexes are normal. Visualized portions of the chest and abdomen are unremarkable.      Right breast blue dye injection, lumpectomy, sentinel lymph node excision was performed on 07/03/2018:  A.  Breast, left, reduction mammoplasty: Benign breast tissue with fibrocystic change  Unremarkable skin    B.  [place] lymph node #1, excision: 2 lymph nodes, negative for metastatic carcinoma    C.  [place] lymph node #2, excision: 2 lymph nodes, negative for metastatic carcinoma    D.  Breast, right, oncoplastic reduction: Benign breast tissue with  fibrocystic change  Unremarkable skin    E.  Breast, right, lumpectomy: Invasive well-differentiated ductal   carcinoma (grade 1)  Ductal carcinoma in situ, intermediate nuclear grade  Lateral and posterior margins positive for invasive carcinoma  Ductal carcinoma in situ present less than 1 mm from lateral margin  Previous biopsy site identified  Fibrocystic change    F.  Right breast, additional posterior margin, excision: Fibrocystic  change  Negative for invasive carcinoma  Negative for ductal carcinoma in situ    G.  Right breast, additional medial margin, excision: Benign breast  tissue  Negative for invasive carcinoma  Negative for ductal carcinoma in situ    H.  Right breast, additional inferior margin, excision: Fibrocystic  change  Negative for invasive carcinoma  Negative for ductal carcinoma in situ    CANCER CASE SUMMARY  Procedure: Excision  Specimen laterality: Right  Tumor size: 1.2 x 1 x 0.7 cm (greatest dimension measured  microscopically)  Histologic type: Invasive carcinoma of no special type (ductal, not  otherwise specified)  Histologic grade ([place] histologic score)       Tubular differentiation: Score 1       Nuclear pleomorphism: Score 2       Mitotic rate: Score 1       Overall grade: Grade 1 (score 4)  Ductal carcinoma in situ: Present, negative for extensive intraductal  component       Nuclear grade: Grade II (intermediate)  Lobular carcinoma in situ: Not identified  Margins: Invasive carcinoma present at lateral/orange inked margin   Final posterior margin negative for carcinoma   Ductal carcinoma in situ present less than 1 mm from lateral/orange  inked margin  Treatment effect: No known presurgical therapy  Pathologic stage classification (pTNM, AJCC 8th Edition): pT1c (sn)pN0 [0  out of 4 lymph nodes]     ER positive >90%  [place] positive   80%  HER/2 Negative (1+)     Oncotype DX Breast Cancer Report-Node Negative   Recurrence Score Result-15 (low risk).       On 07/17/2018: RIGHT breast, re-excision: Organizational changes of prior biopsy, no  evidence of residual neoplasm.  Margins of excision are negative for neoplasm.     Per Medical Oncology, Dr. Samuel Prow:  Given low score Oncotype Dx (her score is even less than 16 per TAIL[employer]x data published in ASCO 2018), No indication and No Benefit for adjuvant chemotherapy. Proceed with adjuvant RT followed by hormonal therapy     RT was completed on 10/01/2018. ET:  Tamoxifen 20 mg po daily was started on 10/02/2018 with fair tolerance.  Robotic TLH/BSO 11/15/2018 by Dr. Marc Kapoor at [facility]; Path negative for malignancy.     D/C Tamoxifen. -Arimidex 1 mg po daily was started on 01/07/2019 where she complained of significant myalgias. D/C Arimidex.   -Femara 2.5 mg po daily was started on 02/04/2019 which she tolerated it poorly as well. Significant arthralgias affecting quality of life. D/C Femara.   -Tamoxifen resumed on 03/04/2019 per patient request   -She has discontinued the Tamoxifen and is declining any further Endocrine therapy due to poor tolerance (fatigue, malaise, aches). -Bilateral screening mammogram 04/08/2019:  Benign. 10/30/2019 follow up. She felt the left breast mass has enlarged in size. Clinically, she has a nodular density at the 1:00 position (2 cm FTN) and the 3:00 position (1 cm FTN) of the left breast.      -MRI bilateral breasts on 10/24/2019:    Linear,   heterogeneous non mass enhancement identified in the upper outer left   breast anterior depth which measures 1.1 cm in AP dimension (image 50   of the axial T1 postcontrast series). There is no lymphadenopathy. Bilateral skin and nipple areolar complexes are normal. Visualized   portions of the chest and abdomen are unremarkable. The linear non mass enhancement is likely due to post-surgical changes/fat necrosis. Recommendation:  Short term follow up bilateral breast MRI in 6 months.     -June 9, 2020: Bilateral Mammogram [facility] was negative. -MRI bilateral breast on 07/07/2020: No convincing evidence of neoplasm. BI-RADS 3. Presents today, 01/05/2021: Continues to do well. No clinical evidence of recurrent disease. She has bilateral benign appearing nodular densities that are overall, clinically stable. We reviewed BSE in detail, limit alcohol, ROM exercises, and NCCN guidelines for breast cancer follow up. We discussed referral to OT for decreased ROM of her right shoulder. She declines at this time and will call us in the even she changes her mind. Plan:   1.    Continue monthly breast/chest wall self examination; detailed instructions reviewed today. Bring any changes to your physician's attention. 2. Continue healthy diet and exercise routinely as tolerated. 3. Maintaining ideal body weight (20-25 BMI) may lead to optimal breast cancer outcomes. 4. Avoid alcohol. 5. Continue follow up with Medical Oncology and Primary Care. 6. RTC June with mammogram same day and PRN. During today's visit, face-to-face time 25 minutes, greater than 50% in counseling education and coordination of care. All questions were answered to her apparent satisfaction, and she is agreeable to the plan as outlined above. Kwasi Smith RN, MSN, APRN-CNP, [id number]  Advanced Oncology Certified Nurse Practitioner  Department of Breast Surgery  [facility]/  [facility] in collaboration with Dr. Dipti Matt. [place]/Tressa Umanzor      During today's visit, face-to-face time 25 minutes, greater than 50% in counseling education and coordination of care. All questions were answered to her apparent satisfaction, and she is agreeable to the plan as outlined above. Kwasi Smith RN, MSN, APRN-CNP, [id number]  Advanced Oncology Certified Nurse Practitioner  Department of Breast Surgery  [facility]/  [facility] in collaboration with Dr. Dipti Matt.  [place]/Tressa Umanzor     June 9, 2020

## 2021-01-05 ENCOUNTER — OFFICE VISIT (OUTPATIENT)
Dept: BREAST CENTER | Age: 41
End: 2021-01-05
Payer: COMMERCIAL

## 2021-01-05 VITALS
DIASTOLIC BLOOD PRESSURE: 62 MMHG | RESPIRATION RATE: 20 BRPM | BODY MASS INDEX: 28.52 KG/M2 | HEART RATE: 85 BPM | HEIGHT: 62 IN | TEMPERATURE: 98.1 F | OXYGEN SATURATION: 98 % | WEIGHT: 155 LBS | SYSTOLIC BLOOD PRESSURE: 114 MMHG

## 2021-01-05 DIAGNOSIS — C50.919 MALIGNANT NEOPLASM OF BREAST, STAGE 1, ESTROGEN RECEPTOR POSITIVE, UNSPECIFIED LATERALITY (HCC): ICD-10-CM

## 2021-01-05 DIAGNOSIS — Z17.0 MALIGNANT NEOPLASM OF BREAST, STAGE 1, ESTROGEN RECEPTOR POSITIVE, UNSPECIFIED LATERALITY (HCC): ICD-10-CM

## 2021-01-05 DIAGNOSIS — Z12.31 SCREENING MAMMOGRAM FOR HIGH-RISK PATIENT: Primary | ICD-10-CM

## 2021-01-05 PROCEDURE — 99214 OFFICE O/P EST MOD 30 MIN: CPT | Performed by: NURSE PRACTITIONER

## 2021-01-05 PROCEDURE — 99213 OFFICE O/P EST LOW 20 MIN: CPT | Performed by: NURSE PRACTITIONER

## 2021-01-05 NOTE — PATIENT INSTRUCTIONS

## 2021-05-03 ENCOUNTER — HOSPITAL ENCOUNTER (OUTPATIENT)
Dept: INFUSION THERAPY | Age: 41
Discharge: HOME | End: 2021-05-03
Payer: COMMERCIAL

## 2021-05-03 ENCOUNTER — OFFICE VISIT (OUTPATIENT)
Dept: ONCOLOGY | Age: 41
End: 2021-05-03
Payer: COMMERCIAL

## 2021-05-03 VITALS
OXYGEN SATURATION: 99 % | SYSTOLIC BLOOD PRESSURE: 131 MMHG | DIASTOLIC BLOOD PRESSURE: 81 MMHG | HEIGHT: 62 IN | TEMPERATURE: 99 F | BODY MASS INDEX: 28.34 KG/M2 | WEIGHT: 154 LBS | HEART RATE: 80 BPM

## 2021-05-03 DIAGNOSIS — Z17.0 MALIGNANT NEOPLASM OF BREAST, STAGE 1, ESTROGEN RECEPTOR POSITIVE, UNSPECIFIED LATERALITY (HCC): ICD-10-CM

## 2021-05-03 DIAGNOSIS — Z85.3 PERSONAL HISTORY OF BREAST CANCER: Primary | ICD-10-CM

## 2021-05-03 DIAGNOSIS — C50.919 MALIGNANT NEOPLASM OF BREAST, STAGE 1, ESTROGEN RECEPTOR POSITIVE, UNSPECIFIED LATERALITY (HCC): ICD-10-CM

## 2021-05-03 LAB
ALBUMIN SERPL-MCNC: 4.5 G/DL (ref 3.5–5.2)
ALP BLD-CCNC: 185 U/L (ref 35–104)
ALT SERPL-CCNC: 8 U/L (ref 0–32)
ANION GAP SERPL CALCULATED.3IONS-SCNC: 9 MMOL/L (ref 7–16)
AST SERPL-CCNC: 20 U/L (ref 0–31)
BASOPHILS ABSOLUTE: 0.05 E9/L (ref 0–0.2)
BASOPHILS RELATIVE PERCENT: 0.6 % (ref 0–2)
BILIRUB SERPL-MCNC: 0.7 MG/DL (ref 0–1.2)
BUN BLDV-MCNC: 10 MG/DL (ref 6–20)
CALCIUM SERPL-MCNC: 10.2 MG/DL (ref 8.6–10.2)
CHLORIDE BLD-SCNC: 105 MMOL/L (ref 98–107)
CO2: 26 MMOL/L (ref 22–29)
CREAT SERPL-MCNC: 0.8 MG/DL (ref 0.5–1)
EOSINOPHILS ABSOLUTE: 0.09 E9/L (ref 0.05–0.5)
EOSINOPHILS RELATIVE PERCENT: 1.1 % (ref 0–6)
GFR AFRICAN AMERICAN: >60
GFR NON-AFRICAN AMERICAN: >60 ML/MIN/1.73
GLUCOSE BLD-MCNC: 92 MG/DL (ref 74–99)
HCT VFR BLD CALC: 43.7 % (ref 34–48)
HEMOGLOBIN: 14 G/DL (ref 11.5–15.5)
IMMATURE GRANULOCYTES #: 0.02 E9/L
IMMATURE GRANULOCYTES %: 0.2 % (ref 0–5)
LYMPHOCYTES ABSOLUTE: 2.84 E9/L (ref 1.5–4)
LYMPHOCYTES RELATIVE PERCENT: 33.6 % (ref 20–42)
MCH RBC QN AUTO: 29.7 PG (ref 26–35)
MCHC RBC AUTO-ENTMCNC: 32 % (ref 32–34.5)
MCV RBC AUTO: 92.6 FL (ref 80–99.9)
MONOCYTES ABSOLUTE: 0.56 E9/L (ref 0.1–0.95)
MONOCYTES RELATIVE PERCENT: 6.6 % (ref 2–12)
NEUTROPHILS ABSOLUTE: 4.9 E9/L (ref 1.8–7.3)
NEUTROPHILS RELATIVE PERCENT: 57.9 % (ref 43–80)
PDW BLD-RTO: 13.1 FL (ref 11.5–15)
PLATELET # BLD: 396 E9/L (ref 130–450)
PMV BLD AUTO: 9.2 FL (ref 7–12)
POTASSIUM SERPL-SCNC: 4.2 MMOL/L (ref 3.5–5)
RBC # BLD: 4.72 E12/L (ref 3.5–5.5)
SODIUM BLD-SCNC: 140 MMOL/L (ref 132–146)
TOTAL PROTEIN: 7.6 G/DL (ref 6.4–8.3)
WBC # BLD: 8.5 E9/L (ref 4.5–11.5)

## 2021-05-03 PROCEDURE — 80053 COMPREHEN METABOLIC PANEL: CPT

## 2021-05-03 PROCEDURE — 85025 COMPLETE CBC W/AUTO DIFF WBC: CPT

## 2021-05-03 PROCEDURE — 99214 OFFICE O/P EST MOD 30 MIN: CPT | Performed by: INTERNAL MEDICINE

## 2021-05-03 PROCEDURE — 99212 OFFICE O/P EST SF 10 MIN: CPT

## 2021-05-03 PROCEDURE — 36415 COLL VENOUS BLD VENIPUNCTURE: CPT

## 2021-05-03 NOTE — PROGRESS NOTES
Department of [facility] Oncology  Attending Clinic Note    Reason for Visit:  Follow-up on a patient with Right Breast Cancer    PCP:  Dinorah Camp MD    History of Present Illness: The mass was located in the 12 o'clock position of right breast    Breast cancer risk factors include family hx on father's side, delayed child bearing, menarche before age 16, family hx of ovarian CA and gender. Ivory Kanu of menarche was 7. Last menstrual period was Mid month April. Patient has hormonal therapy, recent Depo, has been on OCPs or similar since 19y until 36y. Patient is Melissa Gonzalez denies nipple discharge    Bilateral Diagnostic Mammogram 05/03/2018: A spiculated mass is seen in the upper outer quadrant of the right breast in approximately the 11 o'clock position underlying the marked area of palpable concern. The spiculation surrounding the mass overall measures 3 cm diameter by mammography. Right Breast Ultrasound on 05/03/2018: Taller than wide macro-lobulated and micro-lobulated mass with posterior shadowing and increased blood flow in the 11 o'clock position of the right breast, 10 cm from the nipple measuring 10 x 9 x 9 mm with surrounding spiculation and retraction of connective tissue. On 05/09/2018:  Right breast, 12:00 core needle biopsy: Invasive, well-differentiated ductal carcinoma (grade 1)  Comment: Focal low-grade DCIS is also noted.  Calponin and p63 immunostains show a lack of myoepithelial layer which supports the interpretation.  The tumor has a [place] score of 2 (tubule formation) +2 (nuclear pleomorphism) +1 (mitotic count) = 5. ER positive >90%  CT positive   80%  HER/2 Negative (1+)    CXR PA/Lateral on 05/17/2018:  No focal consolidation    Right axillary U/S on 05/17/2018: There is no axillary lymphadenopathy.     MRI Bilateral Breast 05/30/2018:  A 1.3 x 0.8 x 1.1 cm spiculated, enhancing mass is identified in the right breast 12:00 with associated artifact from a metallic biopsy clip. No suspicious mass or non-mass enhancement seen on the left. There is no lymphadenopathy. Bilateral skin and nipple areolar complexes are normal. Visualized portions of the chest and abdomen are unremarkable. Right breast blue dye injection, lumpectomy, sentinel lymph node excision was performed on 07/03/2018:  A.  Breast, left, reduction mammoplasty: Benign breast tissue with fibrocystic change  Unremarkable skin    B.  [place] lymph node #1, excision: 2 lymph nodes, negative for metastatic carcinoma    C.  [place] lymph node #2, excision: 2 lymph nodes, negative for metastatic carcinoma    D. Breast, right, oncoplastic reduction: Benign breast tissue with  fibrocystic change  Unremarkable skin    E.  Breast, right, lumpectomy: Invasive well-differentiated ductal   carcinoma (grade 1)  Ductal carcinoma in situ, intermediate nuclear grade  Lateral and posterior margins positive for invasive carcinoma  Ductal carcinoma in situ present less than 1 mm from lateral margin  Previous biopsy site identified  Fibrocystic change    F.  Right breast, additional posterior margin, excision: Fibrocystic  change  Negative for invasive carcinoma  Negative for ductal carcinoma in situ    G.  Right breast, additional medial margin, excision: Benign breast  tissue  Negative for invasive carcinoma  Negative for ductal carcinoma in situ    H.  Right breast, additional inferior margin, excision: Fibrocystic  change  Negative for invasive carcinoma  Negative for ductal carcinoma in situ    CANCER CASE SUMMARY  Procedure: Excision  Specimen laterality: Right  Tumor size: 1.2 x 1 x 0.7 cm (greatest dimension measured  microscopically)  Histologic type:  Invasive carcinoma of no special type (ductal, not  otherwise specified)  Histologic grade ([place] histologic score)       Tubular differentiation: Score 1       Nuclear pleomorphism: Score 2       Mitotic rate: Score 1       Overall grade: Grade 1 (score 4)  Ductal carcinoma in situ: Present, negative for extensive intraductal  component       Nuclear grade: Grade II (intermediate)  Lobular carcinoma in situ: Not identified  Margins: Invasive carcinoma present at lateral/orange inked margin   Final posterior margin negative for carcinoma   Ductal carcinoma in situ present less than 1 mm from lateral/orange  inked margin  Treatment effect: No known presurgical therapy  Pathologic stage classification (pTNM, AJCC 8th Edition): pT1c (sn)pN0 [0  out of 4 lymph nodes]    ER positive >90%  GA positive   80%  HER/2 Negative (1+)    Oncotype DX Breast Cancer Report-Node Negative   Recurrence Score Result-15   Risk category-low risk    On 07/17/2018: Left breast, re-excision: Organizational changes of prior biopsy, no  evidence of residual neoplasm.  Margins of excision are negative for neoplasm. Given low score Oncotype Dx (her score is even less than 16 per [employer] data published in ASCO 2018), No indication and No Benefit for adjuvant chemotherapy. Proceed with adjuvant RT followed by hormonal therapy    RT was completed on 10/01/2018. Tamoxifen 20 mg po daily was started on 10/02/2018 with fair tolerance. Robotic TLH/BSO 11/15/2018 by Dr. Mian Willis at [facility]; Path negative for malignancy. D/C Tamoxifen. We recommended Arimidex 1 mg po daily. Side effects of Arimidex reviewed with patient. She agreed to proceed. Ca/VitD while on Arimidex. Arimidex 1 mg po daily was started on 01/07/2019 where she complained of significant myalgias. D/C Arimidex. Recommended Femara instead. Femara 2.5 mg po daily was started on 02/04/2019 which she tolerated it poorly as well. Significant arthralgias affecting quality of life. D/C Femara. Patient wanted to go back on Tamoxifen. Tamoxifen 20 mg po daily was re-started on 03/04/2019 with poor tolerance (significant arthralgias affecting quality of life). D/C Tamoxifen. We recommended Exemestane 25 mg po daily.  Side effects of Exemestane reviewed with patient. She agreed to proceed. Exemestane 25 mg po daily was started on 04/08/2019 she also tolerated poorly (significant arthralgias affecting quality of life). Patient d/c Exemestane and wanted to be observation only. She presents today 05/03/2021 for observation. Denies fever, chills, chest pain, SOB. Fair appetite and energy level. Denies any new lumps. Review of Systems;  CONSTITUTIONAL: No fever, chills. Fair appetite and energy level. ENMT: Eyes: No diplopia; Nose: No epistaxis. Mouth: No sore throat. RESPIRATORY: No hemoptysis, shortness of breath, cough. CARDIOVASCULAR: No chest pain, palpitations. GASTROINTESTINAL: No nausea/vomiting, diarrhea/constipation. GENITOURINARY: No dysuria, urinary frequency, hematuria. NEURO: No syncope, presyncope, headache. Remainder:  ROS NEGATIVE    Past Medical History:      Diagnosis Date    Arthritis     Cancer ([facility] [address] 2018    breast cancer--right breast, [place]+ Her2-    Necrotizing granulomatous inflammation of lung (HCC)     states necrotizing granulomatous of the abdomen, not the lung     Medications:  Reviewed and reconciled. Allergies: Allergies   Allergen Reactions    Ultram [Tramadol Hcl]      confused     Physical Exam:  /81 (Site: Left Upper Arm, Position: Sitting, Cuff Size: Medium Adult)   Pulse 80   Temp 99 °F (37.2 °C) (Temporal)   Ht 5' 2\" (1.575 m)   Wt 154 lb (69.9 kg)   LMP 05/16/2018 (LMP Unknown)   SpO2 99%   BMI 28.17 kg/m²   GENERAL: Alert, oriented x 3, not in acute distress. EXTREMITIES: Without clubbing, cyanosis, or edema.    ECOG PS 1    Lab Results   Component Value Date    WBC 8.5 05/03/2021    HGB 14.0 05/03/2021    HCT 43.7 05/03/2021    MCV 92.6 05/03/2021     05/03/2021     Lab Results   Component Value Date     05/03/2021    K 4.2 05/03/2021     05/03/2021    CO2 26 05/03/2021    BUN 10 05/03/2021    CREATININE 0.8 05/03/2021    GLUCOSE 92 05/03/2021    CALCIUM 10.2 05/03/2021    PROT 7.6 05/03/2021    LABALBU 4.5 05/03/2021    BILITOT 0.7 05/03/2021    ALKPHOS 185 (H) 05/03/2021    AST 20 05/03/2021    ALT 8 05/03/2021    LABGLOM >60 05/03/2021    GFRAA >60 05/03/2021     Impression/Plan:  45 y.o. female who underwent Right breast blue dye injection, lumpectomy, sentinel lymph node excision on 07/03/2018:  A.  Breast, left, reduction mammoplasty: Benign breast tissue with fibrocystic change  Unremarkable skin    B.  [place] lymph node #1, excision: 2 lymph nodes, negative for metastatic carcinoma    C.  [place] lymph node #2, excision: 2 lymph nodes, negative for metastatic carcinoma    D. Breast, right, oncoplastic reduction: Benign breast tissue with fibrocystic change  Unremarkable skin    E.  Breast, right, lumpectomy: Invasive well-differentiated ductal carcinoma (grade 1)  Ductal carcinoma in situ, intermediate nuclear grade  Lateral and posterior margins positive for invasive carcinoma  Ductal carcinoma in situ present less than 1 mm from lateral margin  Previous biopsy site identified  Fibrocystic change    F.  Right breast, additional posterior margin, excision: Fibrocystic change  Negative for invasive carcinoma  Negative for ductal carcinoma in situ    G.  Right breast, additional medial margin, excision: Benign breast tissue  Negative for invasive carcinoma  Negative for ductal carcinoma in situ    H.  Right breast, additional inferior margin, excision: Fibrocystic change  Negative for invasive carcinoma  Negative for ductal carcinoma in situ    CANCER CASE SUMMARY  Procedure: Excision  Specimen laterality: Right  Tumor size: 1.2 x 1 x 0.7 cm (greatest dimension measured microscopically)  Histologic type:  Invasive carcinoma of no special type (ductal, not otherwise specified)  Histologic grade ([place] histologic score)       Tubular differentiation: Score 1       Nuclear pleomorphism: Score 2       Mitotic rate: Score 1       (significant arthralgias affecting quality of life). D/C Tamoxifen. We recommended Exemestane 25 mg po daily. Side effects of Exemestane reviewed with patient. She agreed to proceed. Exemestane 25 mg po daily was started on 04/08/2019 which she also tolerated poorly (significant arthralgias affecting quality of life). Patient d/c Exemestane and wanted to be observation only. Bilateral Diagnostic Mammogram/Left Breast 04/08/2019 negative for malignancy. Bilateral Breast MRI 10/24/2019: Linear non mass enhancement in the upper outer left breast is probably benign. It is likely due to postsurgical changes/fat necrosis. No evidence of neoplasm on the right. Left Breast US 10/30/2019: There is an oval solid mass measuring 6 mm seen in the left breast. Internal echogenicity is isoechoic. The finding shows no significant increase in vascularity. Left Breast Ultrasound Core Needle Biopsy 10/30/2019:   Left breast, 3 o'clock position, core biopsy: Fat necrosis with foreign body reaction and focal calcification. Negative for epithelial hyperplasia or neoplasia. Bilateral Screening Mammogram 06/09/2020 No mammographic evidence of malignancy. MRI Bilateral Breast 07/07/2020 no convincing evidence of neoplasm bilaterally. Imaging reviewed. Labs reviewed. NLIDA. Patient wants to continue on observation only. RTC in 6 months.  Mammogram in June 2021    Luigi Pedro MD   [id number]/1/3404  Board Certified Medical Oncologist   Board Certified Internal Medicine

## 2021-06-03 ASSESSMENT — ENCOUNTER SYMPTOMS
SHORTNESS OF BREATH: 0
SINUS PAIN: 0
COUGH: 0
ABDOMINAL DISTENTION: 0
TROUBLE SWALLOWING: 0
NAUSEA: 0
BLOOD IN STOOL: 0
BACK PAIN: 0
EYE ITCHING: 0
DIARRHEA: 0
EYE DISCHARGE: 0
VOICE CHANGE: 0
RHINORRHEA: 0
CHEST TIGHTNESS: 0
CHOKING: 0
WHEEZING: 0
VOMITING: 0
SORE THROAT: 0
CONSTIPATION: 0
ABDOMINAL PAIN: 0
SINUS PRESSURE: 0

## 2021-06-03 NOTE — PROGRESS NOTES
Subjective:  Right breast, 12:00:  Invasive, well-differentiated ductal carcinoma (grade 1)  Comment: Focal low-grade DCIS is also noted.  Calponin and p63 immunostains show a lack of myoepithelial layer which supports the interpretation.  The tumor has a Dallas score of 2 (tubule formation) +2 (nuclear pleomorphism) +1 (mitotic count) = 5. ER positive >90%  OK positive   80%  HER/2 Negative (1+)  -Oncotype Dx recurrence score: 15  -RT was completed on 10/01/2018.  -ET:  Tamoxifen 20 mg po daily was started on 10/02/2018. She has tried multiple therapies and in 2019 opted for observation only. Some elements of all sections below were copied from my previous note 10/30/19 and have been re-examined and updated where appropriate. All elements reflect the medical decision making of today 2020    Patient ID: Kellie Weir is a 45 y.o. female. 1980    Follow up for invasive ductal carcinoma, right breast.    PCP:  Neha Scott MD,    HPI   The mass was located in the 12 o'clock position of right breast     Breast cancer risk factors include family hx on father's side, delayed child bearing, menarche before age 16, family hx of (SECOND COUSIN) ovarian CA and gender. Gwendolynn Campo of menarche was 7. Last menstrual period was Mid month April. Patient has hormonal therapy, recent Depo, has been on OCPs or similar since 19y until 36y. Patient is .  Patient denies nipple discharge     10/24/19  Bilateral Breast MRI   [facility]        INDICATION: Z85.3 Personal history of breast cancer    personal history of, right breast cancer, high risk breast cancer,   post lumpectomy 7/3/18         FINDINGS:   There is heterogeneous fibroglandular tissue with minimal background   parenchymal enhancement. Bilateral postsurgical changes noted. No   suspicious mass or non-mass enhancement seen on the right.  Linear,   heterogeneous non mass enhancement identified in the upper outer left   breast anterior depth which measures 1.1 cm in AP dimension (image 50   of the axial T1 postcontrast series). There is no lymphadenopathy. Bilateral skin and nipple areolar complexes are normal. Visualized   portions of the chest and abdomen are unremarkable.           Impression       1. Linear non mass enhancement in the upper outer left breast is   probably benign. It is likely due to postsurgical changes/fat   necrosis.       2. No evidence of neoplasm on the right.       RECOMMENDATION:   Short-term follow-up bilateral breast MRI with contrast is advised in   6 months.       BIRADS 3: Probably benign finding                   4/8/19   Bilateral Diagnostic Mammogram   JACBCC: Benign, BI-RADS 2    Bilateral Diagnostic Mammogram 05/03/2018: A spiculated mass is seen in the upper outer quadrant of the right breast in approximately the 11 o'clock position underlying the marked area of palpable concern. The spiculation surrounding the mass overall measures 3 cm diameter by mammography.      Right Breast Ultrasound on 05/03/2018: Taller than wide macro-lobulated and micro-lobulated mass with posterior shadowing and increased blood flow in the 11 o'clock position of the right breast, 10 cm from the nipple measuring 10 x 9 x 9 mm with surrounding spiculation and retraction of connective tissue.     On 05/09/2018:  Right breast, 12:00 core needle biopsy: Invasive, well-differentiated ductal carcinoma (grade 1)  Comment: Focal low-grade DCIS is also noted.  Calponin and p63 immunostains show a lack of myoepithelial layer which supports the interpretation.  The tumor has a Melany score of 2 (tubule formation) +2 (nuclear pleomorphism) +1 (mitotic count) = 5. ER positive >90%  NE positive   80%  HER/2 Negative (1+)     CXR PA/Lateral on 05/17/2018:  No focal consolidation     Right axillary U/S on 05/17/2018:   There is no axillary lymphadenopathy.     MRI Bilateral Breast 05/30/2018:  A 1.3 x 0.8 x 1.1 cm spiculated, enhancing mass is identified in the right breast 12:00 with associated artifact from a metallic biopsy clip. No suspicious mass or non-mass enhancement seen on the left. There is no lymphadenopathy. Bilateral skin and nipple areolar complexes are normal. Visualized portions of the chest and abdomen are unremarkable.      Right breast blue dye injection, lumpectomy, sentinel lymph node excision was performed on 07/03/2018:  A.  Breast, left, reduction mammoplasty: Benign breast tissue with fibrocystic change  Unremarkable skin    B.  Porter lymph node #1, excision: 2 lymph nodes, negative for metastatic carcinoma    C.  Porter lymph node #2, excision: 2 lymph nodes, negative for metastatic carcinoma    D. Breast, right, oncoplastic reduction: Benign breast tissue with  fibrocystic change  Unremarkable skin    E.  Breast, right, lumpectomy: Invasive well-differentiated ductal   carcinoma (grade 1)  Ductal carcinoma in situ, intermediate nuclear grade  Lateral and posterior margins positive for invasive carcinoma  Ductal carcinoma in situ present less than 1 mm from lateral margin  Previous biopsy site identified  Fibrocystic change    F.  Right breast, additional posterior margin, excision: Fibrocystic  change  Negative for invasive carcinoma  Negative for ductal carcinoma in situ    G.  Right breast, additional medial margin, excision: Benign breast  tissue  Negative for invasive carcinoma  Negative for ductal carcinoma in situ    H.  Right breast, additional inferior margin, excision: Fibrocystic  change  Negative for invasive carcinoma  Negative for ductal carcinoma in situ    CANCER CASE SUMMARY  Procedure: Excision  Specimen laterality: Right  Tumor size: 1.2 x 1 x 0.7 cm (greatest dimension measured  microscopically)  Histologic type:  Invasive carcinoma of no special type (ductal, not  otherwise specified)  Histologic grade (Melany histologic score)       Tubular differentiation: Score 1       Nuclear pleomorphism: Score 2       Mitotic rate: Score 1       Overall grade: Grade 1 (score 4)  Ductal carcinoma in situ: Present, negative for extensive intraductal  component       Nuclear grade: Grade II (intermediate)  Lobular carcinoma in situ: Not identified  Margins: Invasive carcinoma present at lateral/orange inked margin   Final posterior margin negative for carcinoma   Ductal carcinoma in situ present less than 1 mm from lateral/orange  inked margin  Treatment effect: No known presurgical therapy  Pathologic stage classification (pTNM, AJCC 8th Edition): pT1c (sn)pN0 [0  out of 4 lymph nodes]     ER positive >90%  RI positive   80%  HER/2 Negative (1+)     Oncotype DX Breast Cancer Report-Node Negative   Recurrence Score Result-15   Risk category-low risk     On 07/17/2018: RIGHT breast, re-excision: Organizational changes of prior biopsy, no  evidence of residual neoplasm.  Margins of excision are negative for neoplasm.     -Per Medical Oncology, Dr. Yue Valencia:  No indication and No Benefit for adjuvant chemotherapy. -RT was completed on 10/01/2018.  -ET:  Tamoxifen 20 mg po daily was started on 10/02/2018 with fair tolerance. Robotic TLH/BSO 11/15/2018 by Dr. Tyrel Banks at [facility]; Path negative for malignancy.     D/C Tamoxifen. -Arimidex 1 mg po daily was started on 01/07/2019 where she complained of significant myalgias. D/C Arimidex.   -Femara 2.5 mg po daily was started on 02/04/2019 which she tolerated it poorly as well. Significant arthralgias affecting quality of life. D/C Femara.   -Tamoxifen resumed on 03/04/2019 per patient request.  -She stopped the Tamoxifen due to poor tolerance. Declining further ET.        Past Medical History:   Diagnosis Date    Arthritis     Cancer ([facility] [address] 2018    breast cancer--right breast, ER/RI+ Her2-    Necrotizing granulomatous inflammation of lung (HCC)     states necrotizing granulomatous of the abdomen, not the lung     Past Surgical History:   Procedure Laterality Date    ABDOMEN SURGERY      laparotomy    BREAST REDUCTION SURGERY Bilateral 7/3/2018    RIGHT ONCOPLASTIC BREAST REDUCTION, MATCHING LEFT BREAST REDUCTION performed by Saba Quezada MD at [address]  11/15/2018    abdominal, Laparoscopic    HYSTERECTOMY, VAGINAL  11/15/2018    BSO    KNEE ARTHROSCOPY Left     LYMPH NODE BIOPSY      GA BIOPSY OF BREAST, NEEDLE CORE Right 7/3/2018    RIGHT BREAST NEEDLE LOCALIZATION LUMPECTOMY SENTINEL LYMPH NODE BIOPSY - TRIDENT AND NEOPROBE performed by Brandon Lopez MD at [address] [place] [address]    RIGHTY AXILLARY  EXPLORATION WITH DRAIN PLACEMENT performed by Brandon Lopez MD at [facility] OFFICE/OUTPT VISIT,PROCEDURE ONLY Right 7/17/2018    RE-EXCISION OF RIGHT BREAST LUMPECTOMY LATERAL MARGIN ---DR. HUERTA -- REVISION BREAST REDUCTION FOLLOWING LUMPECTOMY RE-EXCISION performed by Brandon Lopez MD at [address] [place] [address]       Social History     Socioeconomic History    Marital status: Single     Spouse name: Not on file    Number of children: Not on file    Years of education: Not on file    Highest education level: Not on file   Occupational History    Not on file   Tobacco Use    Smoking status: Current Every Day Smoker     Packs/day: 1.00     Years: 20.00     Pack years: 20.00     Types: Cigarettes    Smokeless tobacco: Never Used    Tobacco comment: [email or web address]    Vaping Use    Vaping Use: Former    Substances: Never   Substance and Sexual Activity    Alcohol use: Yes     Comment: ocassional    Drug use: No    Sexual activity: Yes     Partners: Male   Other Topics Concern    Not on file   Social History Narrative    Lives in [place] alone, family nearby. Works for [employer].      Social Determinants of Health     Financial Resource Strain:     Difficulty of Paying Living Expenses:    Food Insecurity:     Worried About Running Out of Food in the Last Year:     Ran Out of Food in the Last Year:    Transportation Needs:     Lack of Transportation (Medical):  Lack of Transportation (Non-Medical):    Physical Activity:     Days of Exercise per Week:     Minutes of Exercise per Session:    Stress:     Feeling of Stress :    Social Connections:     Frequency of Communication with Friends and Family:     Frequency of Social Gatherings with Friends and Family:     Attends Sabianism Services:     Active Member of Clubs or Organizations:     Attends Club or Organization Meetings:     Marital Status:    Intimate Partner Violence:     Fear of Current or Ex-Partner:     Emotionally Abused:     Physically Abused:     Sexually Abused: Allergies   Allergen Reactions    Ultram [Tramadol Hcl]      confused     No current outpatient medications on file prior to visit. No current facility-administered medications on file prior to visit. Review of Systems   Constitutional: Negative for activity change, appetite change, chills, fatigue, fever and unexpected weight change. She continues to do well. 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 Enjoys camping and riding [employer]   HENT: Negative for congestion, postnasal drip, rhinorrhea, sinus pressure, sinus pain, sore throat, trouble swallowing and voice change. Eyes: Negative for discharge, itching and visual disturbance. Respiratory: Negative for cough, choking, chest tightness, shortness of breath and wheezing. Cardiovascular: Negative for chest pain, palpitations and leg swelling. Gastrointestinal: Negative for abdominal distention, abdominal pain, blood in stool, constipation, diarrhea, nausea and vomiting. Endocrine: Negative for cold intolerance and heat intolerance. Genitourinary: Negative for difficulty urinating, dysuria, frequency and hematuria.    Musculoskeletal: Negative for arthralgias, back pain, gait problem, joint swelling, myalgias, neck pain and neck stiffness. Allergic/Immunologic: Negative for environmental allergies and food allergies. Neurological: Negative for dizziness, seizures, syncope, speech difficulty, weakness, light-headedness and headaches. Hematological: Negative for adenopathy. Does not bruise/bleed easily. Psychiatric/Behavioral: Negative for agitation, confusion and decreased concentration. The patient is not nervous/anxious. Objective:   Physical Exam  Vitals and nursing note reviewed. Constitutional:       General: She is not in acute distress. Appearance: She is well-developed. She is not diaphoretic. Comments: ECOG 0; pleasant and conversant. HENT:      Head: Normocephalic and atraumatic. Mouth/Throat:      Pharynx: No oropharyngeal exudate. Eyes:      General: No scleral icterus. Right eye: No discharge. Left eye: No discharge. Conjunctiva/sclera: Conjunctivae normal.   Neck:      Thyroid: No thyromegaly. Vascular: No JVD. Trachea: No tracheal deviation. Cardiovascular:      Rate and Rhythm: Normal rate and regular rhythm. Heart sounds: Normal heart sounds. No murmur heard. No friction rub. No gallop. Pulmonary:      Effort: Pulmonary effort is normal. No respiratory distress or retractions. Breath sounds: Normal breath sounds. No stridor. No wheezing or rales. Chest:      Chest wall: No mass, lacerations, deformity, swelling, tenderness or edema. Breasts: Breasts are symmetrical.         Right: No inverted nipple, mass, nipple discharge, skin change or tenderness. Left: No inverted nipple, mass, nipple discharge, skin change or tenderness. Comments: Breast tissue is dense bilaterally, right greater than left. She has post treatment changes on the right; Billateral nodular densities are clinically stable and there are no clinically suspicious findings. No axillary adenopathy. Abdominal:      General: There is no distension. Palpations: Abdomen is soft. Tenderness: There is no abdominal tenderness. There is no guarding or rebound. Musculoskeletal:         General: No tenderness or deformity. Right shoulder: Decreased range of motion (Has a history of shoulder injury in the past.  Has limited ROM of RUE.). Left shoulder: Normal.      Cervical back: Normal range of motion and neck supple. Lymphadenopathy:      Cervical: No cervical adenopathy. Right cervical: No superficial, deep or posterior cervical adenopathy. Left cervical: No superficial, deep or posterior cervical adenopathy. Upper Body:      Right upper body: No pectoral adenopathy. Left upper body: No pectoral adenopathy. Skin:     General: Skin is warm and dry. Coloration: Skin is not pale. Findings: No erythema or rash. Neurological:      Mental Status: She is alert and oriented to person, place, and time. Coordination: Coordination normal.   Psychiatric:         Behavior: Behavior normal.         Thought Content: Thought content normal.         Judgment: Judgment normal.        Assessment:     45 y.o. extremely pleasant female with right breast cancer, well-differentiated, ER/LA positive HER-2/marc negative disease. Bilateral Diagnostic Mammogram 05/03/2018: A spiculated mass is seen in the upper outer quadrant of the right breast in approximately the 11 o'clock position underlying the marked area of palpable concern. The spiculation surrounding the mass overall measures 3 cm diameter by mammography.       Right axillary U/S on 05/17/2018: There is no axillary lymphadenopathy.     05/09/2018 Right breast, 12:00 core needle biopsy: Invasive, well-differentiated ductal carcinoma (grade 1)  Comment: Focal low-grade DCIS is also noted.  Calponin and p63 immunostains show a lack of myoepithelial layer which supports the interpretation.  The tumor has a Melany score of 2 (tubule formation) +2 (nuclear pleomorphism) +1 carcinoma in situ: Not identified. Margins: Invasive carcinoma present at lateral/orange inked margin   Final posterior margin negative for carcinoma. Ductal carcinoma in situ present less than 1 mm from lateral/orange inked margin. Treatment effect: No known presurgical therapy  Pathologic stage classification (pTNM, AJCC 8th Edition): pT1c (sn)pN0 [0 out of 4 lymph nodes]     ER positive >90%  OR positive   80%  HER/2 Negative (1+)     Oncotype DX Breast Cancer Report-Node Negative   Recurrence Score Result-15 (low risk). On 07/17/2018: RIGHT breast, re-excision: Organizational changes of prior biopsy, no  evidence of residual neoplasm.  Margins of excision are negative for neoplasm.     Per Medical Oncology, Dr. Lyssa Pantoja:  Given low score Oncotype Dx (her score is even less than 16 per [employer] data published in ASCO 2018), No indication and No Benefit for adjuvant chemotherapy. Proceed with adjuvant RT followed by hormonal therapy     10/01/2018 completed adjuvant RT. ET:  Tamoxifen 20 mg po daily was started on 10/02/2018 with fair tolerance. Robotic TLH/BSO 11/15/2018 by Dr. Karon Campo at [facility]; Path negative for malignancy.     D/C Tamoxifen. -Arimidex 1 mg po daily was started on 01/07/2019 where she complained of significant myalgias. D/C Arimidex.   -Femara 2.5 mg po daily was started on 02/04/2019 which she tolerated it poorly as well. Significant arthralgias affecting quality of life. D/C Femara.   -Tamoxifen resumed on 03/04/2019 per patient request   -She has discontinued the Tamoxifen and is declining any further Endocrine therapy due to poor tolerance (fatigue, malaise, aches). -Opted for observation only  -Bilateral screening mammogram 04/08/2019:  Benign. 10/30/2019 follow up. She felt the left breast mass has enlarged in size.   Clinically, she has a nodular density at the 1:00 position (2 cm FTN) and the 3:00 position (1 cm FTN) of the left breast. -10/24/2019 MRI bilateral breasts:    Linear,   heterogeneous non mass enhancement identified in the upper outer left   breast anterior depth which measures 1.1 cm in AP dimension (image 50   of the axial T1 postcontrast series). There is no lymphadenopathy. Bilateral skin and nipple areolar complexes are normal. Visualized   portions of the chest and abdomen are unremarkable. The linear non mass enhancement is likely due to post-surgical changes/fat necrosis. Recommendation:  Short term follow up bilateral breast MRI in 6 months.   -06/09/2020: Bilateral Mammogram [facility] was negative.    -07/07/2020 MRI bilateral breast: No convincing evidence of neoplasm. BI-RADS 3.  -06/17/2021 bilateral screening mammogram: Negative, BI-RADS 1.  -06/14/2021 Clinical follow up is without evidence of recurrent disease. Imaging reviewed, including her BI-RADS result. In view of history of breast cancer, poor tolerance to endocrine therapy, and dense breast tissue, recommend MRI screening in 6 months. Plan:   1. Continue monthly breast/chest wall self examination; detailed instructions reviewed today. Bring any changes to your physician's attention. 2. Continue healthy diet and exercise routinely as tolerated. 3. Maintaining ideal body weight (20-25 BMI) may lead to optimal breast cancer outcomes. 4. Avoid alcohol. 5. Continue follow up with Medical Oncology and Primary Care. 6. RTC 6 months with MRI prior. During today's visit, face-to-face time 15 minutes, greater than 50% in counseling education and coordination of care. All questions were answered to her apparent satisfaction, and she is agreeable to the plan as outlined above. Sandy Rhodes, RN, MSN, APRN-CNP, [address]  Advanced Oncology Certified Nurse Practitioner  Department of Breast Surgery  [facility]/  [facility] in collaboration with Dr. Jacqueline Duncan/Dr. Skyler Jo APRN-CNP

## 2021-06-17 ENCOUNTER — OFFICE VISIT (OUTPATIENT)
Dept: BREAST CENTER | Age: 41
End: 2021-06-17
Payer: COMMERCIAL

## 2021-06-17 ENCOUNTER — HOSPITAL ENCOUNTER (OUTPATIENT)
Dept: GENERAL RADIOLOGY | Age: 41
Discharge: HOME | End: 2021-06-19
Payer: COMMERCIAL

## 2021-06-17 VITALS
DIASTOLIC BLOOD PRESSURE: 60 MMHG | BODY MASS INDEX: 27.6 KG/M2 | WEIGHT: 150 LBS | HEART RATE: 75 BPM | HEIGHT: 62 IN | OXYGEN SATURATION: 98 % | SYSTOLIC BLOOD PRESSURE: 116 MMHG | TEMPERATURE: 98.1 F | RESPIRATION RATE: 18 BRPM

## 2021-06-17 DIAGNOSIS — Z85.3 PERSONAL HISTORY OF BREAST CANCER: ICD-10-CM

## 2021-06-17 DIAGNOSIS — Z12.31 SCREENING MAMMOGRAM FOR HIGH-RISK PATIENT: ICD-10-CM

## 2021-06-17 DIAGNOSIS — R92.2 DENSE BREAST: Primary | ICD-10-CM

## 2021-06-17 PROCEDURE — 99212 OFFICE O/P EST SF 10 MIN: CPT | Performed by: NURSE PRACTITIONER

## 2021-06-17 PROCEDURE — 99213 OFFICE O/P EST LOW 20 MIN: CPT | Performed by: NURSE PRACTITIONER

## 2021-06-17 PROCEDURE — 77063 BREAST TOMOSYNTHESIS BI: CPT

## 2021-06-17 RX ORDER — IBUPROFEN 800 MG/1
800 TABLET ORAL EVERY 8 HOURS PRN
COMMUNITY
Start: 2021-06-11

## 2021-06-17 NOTE — PATIENT INSTRUCTIONS
RELEASE OF RESULTS AND RECORDS  As of October 1, 2020, all results (x-ray reports, labwork, pathology reports) will be released through [address] in real time per federal law. Once your test results are final, they will be automatically released to your electronic medical record [employer] where you will be able to see those results and any clinical notes associated with that result. In some cases, you may see those results and notes before your provider or the staff have had a chance to review. We will make every effort to contact you, especially about any abnormal or confusing results. If you view a result before we have contacted you, please wait up to one business day for us to reach you before calling with questions. If anything in your notes seems inaccurate, please message us through [employer] with potential corrections. If you see a term or language in your clinical note that doesn't make sense, please use the online health library reference tool in your MyChart (under the Health tab)  to help you interpret the note.       Patient will be scheduled for breast MRI in December

## 2021-07-29 DIAGNOSIS — N63.0 BREAST LUMP: Primary | ICD-10-CM

## 2021-07-30 ASSESSMENT — ENCOUNTER SYMPTOMS
EYE ITCHING: 0
NAUSEA: 0
SHORTNESS OF BREATH: 0
TROUBLE SWALLOWING: 0
EYE DISCHARGE: 0
SORE THROAT: 0
VOICE CHANGE: 0
CHOKING: 0
RHINORRHEA: 0
ABDOMINAL PAIN: 0
BACK PAIN: 0
WHEEZING: 0
BLOOD IN STOOL: 0
CONSTIPATION: 0
CHEST TIGHTNESS: 0
ABDOMINAL DISTENTION: 0
SINUS PAIN: 0
COUGH: 0
VOMITING: 0
SINUS PRESSURE: 0
DIARRHEA: 0

## 2021-07-30 NOTE — PROGRESS NOTES
Subjective:  Right breast, 12:00:  Invasive, well-differentiated ductal carcinoma (grade 1)  Comment: Focal low-grade DCIS is also noted.  Calponin and p63 immunostains show a lack of myoepithelial layer which supports the interpretation.  The tumor has a Litchfield score of 2 (tubule formation) +2 (nuclear pleomorphism) +1 (mitotic count) = 5. ER positive >90%  VT positive   80%  HER/2 Negative (1+)  -Oncotype Dx recurrence score: 15  -RT was completed on 10/01/2018.  -ET:  Tamoxifen 20 mg po daily was started on 10/02/2018. She has tried multiple therapies and in 2019 opted for observation only. Some elements of all sections below were copied from my previous note 10/30/19 and have been re-examined and updated where appropriate. All elements reflect the medical decision making of today 2020    Patient ID: Ora Saini is a 45 y.o. female. 1980    Follow up for invasive ductal carcinoma, right breast.    PCP:  Tania Ngo MD,    HPI   The mass was located in the 12 o'clock position of right breast     Breast cancer risk factors include family hx on father's side, delayed child bearing, menarche before age 16, family hx of (SECOND COUSIN) ovarian CA and gender. Kirby Radha of menarche was 7. Last menstrual period was Mid month April. Patient has hormonal therapy, recent Depo, has been on OCPs or similar since 19y until 36y. Patient is .  Patient denies nipple discharge     10/24/19  Bilateral Breast MRI   [facility]        INDICATION: Z85.3 Personal history of breast cancer    personal history of, right breast cancer, high risk breast cancer,   post lumpectomy 7/3/18         FINDINGS:   There is heterogeneous fibroglandular tissue with minimal background   parenchymal enhancement. Bilateral postsurgical changes noted. No   suspicious mass or non-mass enhancement seen on the right.  Linear,   heterogeneous non mass enhancement identified in the upper outer left   breast anterior depth which measures 1.1 cm in AP dimension (image 50   of the axial T1 postcontrast series). There is no lymphadenopathy. Bilateral skin and nipple areolar complexes are normal. Visualized   portions of the chest and abdomen are unremarkable.           Impression       1. Linear non mass enhancement in the upper outer left breast is   probably benign. It is likely due to postsurgical changes/fat   necrosis.       2. No evidence of neoplasm on the right.       RECOMMENDATION:   Short-term follow-up bilateral breast MRI with contrast is advised in   6 months.       BIRADS 3: Probably benign finding                   4/8/19   Bilateral Diagnostic Mammogram   JACBCC: Benign, BI-RADS 2    Bilateral Diagnostic Mammogram 05/03/2018: A spiculated mass is seen in the upper outer quadrant of the right breast in approximately the 11 o'clock position underlying the marked area of palpable concern. The spiculation surrounding the mass overall measures 3 cm diameter by mammography.      Right Breast Ultrasound on 05/03/2018: Taller than wide macro-lobulated and micro-lobulated mass with posterior shadowing and increased blood flow in the 11 o'clock position of the right breast, 10 cm from the nipple measuring 10 x 9 x 9 mm with surrounding spiculation and retraction of connective tissue.     On 05/09/2018:  Right breast, 12:00 core needle biopsy: Invasive, well-differentiated ductal carcinoma (grade 1)  Comment: Focal low-grade DCIS is also noted.  Calponin and p63 immunostains show a lack of myoepithelial layer which supports the interpretation.  The tumor has a Melany score of 2 (tubule formation) +2 (nuclear pleomorphism) +1 (mitotic count) = 5. ER positive >90%  LA positive   80%  HER/2 Negative (1+)     CXR PA/Lateral on 05/17/2018:  No focal consolidation     Right axillary U/S on 05/17/2018:   There is no axillary lymphadenopathy.     MRI Bilateral Breast 05/30/2018:  A 1.3 x 0.8 x 1.1 cm spiculated, enhancing mass is identified in the right breast 12:00 with associated artifact from a metallic biopsy clip. No suspicious mass or non-mass enhancement seen on the left. There is no lymphadenopathy. Bilateral skin and nipple areolar complexes are normal. Visualized portions of the chest and abdomen are unremarkable.      Right breast blue dye injection, lumpectomy, sentinel lymph node excision was performed on 07/03/2018:  A.  Breast, left, reduction mammoplasty: Benign breast tissue with fibrocystic change  Unremarkable skin    B.  [place] lymph node #1, excision: 2 lymph nodes, negative for metastatic carcinoma    C.  [place] lymph node #2, excision: 2 lymph nodes, negative for metastatic carcinoma    D. Breast, right, oncoplastic reduction: Benign breast tissue with  fibrocystic change  Unremarkable skin    E.  Breast, right, lumpectomy: Invasive well-differentiated ductal   carcinoma (grade 1)  Ductal carcinoma in situ, intermediate nuclear grade  Lateral and posterior margins positive for invasive carcinoma  Ductal carcinoma in situ present less than 1 mm from lateral margin  Previous biopsy site identified  Fibrocystic change    F.  Right breast, additional posterior margin, excision: Fibrocystic  change  Negative for invasive carcinoma  Negative for ductal carcinoma in situ    G.  Right breast, additional medial margin, excision: Benign breast  tissue  Negative for invasive carcinoma  Negative for ductal carcinoma in situ    H.  Right breast, additional inferior margin, excision: Fibrocystic  change  Negative for invasive carcinoma  Negative for ductal carcinoma in situ    CANCER CASE SUMMARY  Procedure: Excision  Specimen laterality: Right  Tumor size: 1.2 x 1 x 0.7 cm (greatest dimension measured  microscopically)  Histologic type:  Invasive carcinoma of no special type (ductal, not  otherwise specified)  Histologic grade (Melany histologic score)       Tubular differentiation: Score 1       Nuclear pleomorphism: Score 2       Mitotic rate: Score 1       Overall grade: Grade 1 (score 4)  Ductal carcinoma in situ: Present, negative for extensive intraductal  component       Nuclear grade: Grade II (intermediate)  Lobular carcinoma in situ: Not identified  Margins: Invasive carcinoma present at lateral/orange inked margin   Final posterior margin negative for carcinoma   Ductal carcinoma in situ present less than 1 mm from lateral/orange  inked margin  Treatment effect: No known presurgical therapy  Pathologic stage classification (pTNM, AJCC 8th Edition): pT1c (sn)pN0 [0  out of 4 lymph nodes]     ER positive >90%  NH positive   80%  HER/2 Negative (1+)     Oncotype DX Breast Cancer Report-Node Negative   Recurrence Score Result-15   Risk category-low risk     On 07/17/2018: RIGHT breast, re-excision: Organizational changes of prior biopsy, no  evidence of residual neoplasm.  Margins of excision are negative for neoplasm.     -Per Medical Oncology, Dr. Dejan Mcmahan:  No indication and No Benefit for adjuvant chemotherapy. -RT was completed on 10/01/2018.  -ET:  Tamoxifen 20 mg po daily was started on 10/02/2018 with fair tolerance. Robotic TLH/BSO 11/15/2018 by Dr. Jeanne Saleh at [facility]; Path negative for malignancy.     D/C Tamoxifen. -Arimidex 1 mg po daily was started on 01/07/2019 where she complained of significant myalgias. D/C Arimidex.   -Femara 2.5 mg po daily was started on 02/04/2019 which she tolerated it poorly as well. Significant arthralgias affecting quality of life. D/C Femara.   -Tamoxifen resumed on 03/04/2019 per patient request.  -She stopped the Tamoxifen due to poor tolerance. Declining further ET.        Past Medical History:   Diagnosis Date    Arthritis     [facility] ([facility] [address]     Cancer ([facility] [address] 2018    breast cancer--right breast, ER/NH+ Her2-    History of therapeutic radiation     Necrotizing granulomatous inflammation of lung (HCC)     states necrotizing granulomatous of the abdomen, not the lung     Past Surgical History:   Procedure Laterality Date    ABDOMEN SURGERY      laparotomy    BREAST REDUCTION SURGERY Bilateral 7/3/2018    RIGHT ONCOPLASTIC BREAST REDUCTION, MATCHING LEFT BREAST REDUCTION performed by Tho Lozano MD at [address]  11/15/2018    abdominal, Laparoscopic    HYSTERECTOMY, VAGINAL  11/15/2018    BSO    KNEE ARTHROSCOPY Left     LYMPH NODE BIOPSY      MD BIOPSY OF BREAST, NEEDLE CORE Right 7/3/2018    RIGHT BREAST NEEDLE LOCALIZATION LUMPECTOMY SENTINEL LYMPH NODE BIOPSY - TRIDENT AND NEOPROBE performed by Nathalie Barros MD at [address] [place] [address] AXILL,COMPLX Right 7/12/2018    RIGHTY AXILLARY  EXPLORATION WITH DRAIN PLACEMENT performed by Nathalie Barros MD at [facility] OFFICE/OUTPT VISIT,PROCEDURE ONLY Right 7/17/2018    RE-EXCISION OF RIGHT BREAST LUMPECTOMY LATERAL MARGIN ---DR. HUERTA -- REVISION BREAST REDUCTION FOLLOWING LUMPECTOMY RE-EXCISION performed by Nathalie Barros MD at [facility]       Social History     Socioeconomic History    Marital status: Single     Spouse name: Not on file    Number of children: Not on file    Years of education: Not on file    Highest education level: Not on file   Occupational History    Not on file   Tobacco Use    Smoking status: Current Every Day Smoker     Packs/day: 1.00     Years: 20.00     Pack years: 20.00     Types: Cigarettes    Smokeless tobacco: Never Used    Tobacco comment: [employer] go[employer]    Vaping Use    Vaping Use: Former    Substances: Never   Substance and Sexual Activity    Alcohol use: Yes     Comment: ocassional    Drug use: No    Sexual activity: Yes     Partners: Male   Other Topics Concern    Not on file   Social History Narrative    Lives in [place] alone, family nearby. Works for [employer].      Social Determinants of Health     Financial Resource Strain:     Difficulty of Paying Living Expenses: Food Insecurity:     Worried About Running Out of Food in the Last Year:     [address] in the Last Year:    Transportation Needs:     Lack of Transportation (Medical):  Lack of Transportation (Non-Medical):    Physical Activity:     Days of Exercise per Week:     Minutes of Exercise per Session:    Stress:     Feeling of Stress :    Social Connections:     Frequency of Communication with Friends and Family:     Frequency of Social Gatherings with Friends and Family:     Attends Holiness Services:     Active Member of Clubs or Organizations:     Attends Club or Organization Meetings:     Marital Status:    Intimate Partner Violence:     Fear of Current or Ex-Partner:     Emotionally Abused:     Physically Abused:     Sexually Abused: Allergies   Allergen Reactions    Ultram [Tramadol Hcl]      confused     Current Outpatient Medications on File Prior to Visit   Medication Sig Dispense Refill    ibuprofen (ADVIL;MOTRIN) 800 MG tablet Take 800 mg by mouth every 8 hours as needed       No current facility-administered medications on file prior to visit. Review of Systems   Constitutional: Negative for activity change, appetite change, chills, fatigue, fever and unexpected weight change. 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 Enjoys camping and riding [employer].  She notes approximately 2-3 weeks ago she developed sharp pain in her anterior chest wall. It is persistent and is progressive in nature. HENT: Negative for congestion, postnasal drip, rhinorrhea, sinus pressure, sinus pain, sore throat, trouble swallowing and voice change. Eyes: Negative for discharge, itching and visual disturbance. Respiratory: Negative for cough, choking, chest tightness, shortness of breath and wheezing. Cardiovascular: Negative for chest pain, palpitations and leg swelling.    Gastrointestinal: Negative for abdominal distention, abdominal pain, blood in stool, nipple, mass, nipple discharge, skin change or tenderness. Comments: Breast tissue is dense bilaterally, right greater than left. She has post treatment changes on the right; Her exam remains clinically stable and without suspicious findings. No axillary adenopathy. Abdominal:      General: There is no distension. Palpations: Abdomen is soft. Tenderness: There is no abdominal tenderness. There is no guarding or rebound. Musculoskeletal:         General: No tenderness or deformity. Right shoulder: Decreased range of motion (Has a history of shoulder injury in the past.  Has limited ROM of RUE.). Left shoulder: Normal.      Cervical back: Normal range of motion and neck supple. Lymphadenopathy:      Cervical: No cervical adenopathy. Right cervical: No superficial, deep or posterior cervical adenopathy. Left cervical: No superficial, deep or posterior cervical adenopathy. Upper Body:      Right upper body: No pectoral adenopathy. Left upper body: No pectoral adenopathy. Skin:     General: Skin is warm and dry. Coloration: Skin is not pale. Findings: No erythema or rash. Neurological:      Mental Status: She is alert and oriented to person, place, and time. Coordination: Coordination normal.   Psychiatric:         Behavior: Behavior normal.         Thought Content: Thought content normal.         Judgment: Judgment normal.        Assessment:     45 y.o. extremely pleasant female with right breast cancer, well-differentiated, ER/OH positive HER-2/marc negative disease. Bilateral Diagnostic Mammogram 05/03/2018: A spiculated mass is seen in the upper outer quadrant of the right breast in approximately the 11 o'clock position underlying the marked area of palpable concern. The spiculation surrounding the mass overall measures 3 cm diameter by mammography.       Right axillary U/S on 05/17/2018: There is no axillary lymphadenopathy.     05/09/2018 Right breast, 12:00 core needle biopsy: Invasive, well-differentiated ductal carcinoma (grade 1)  Comment: Focal low-grade DCIS is also noted.  Calponin and p63 immunostains show a lack of myoepithelial layer which supports the interpretation.  The tumor has a Melany score of 2 (tubule formation) +2 (nuclear pleomorphism) +1 (mitotic count) = 5. ER positive >90%  W[place] positive   80%  HER/2 Negative (1+)     05/30/2018 MRI Bilateral Breast 05/30/2018:  A 1.3 x 0.8 x 1.1 cm spiculated, enhancing mass is identified in the right breast 12:00 with associated artifact from a metallic biopsy clip. No suspicious mass or non-mass enhancement seen on the left. There is no lymphadenopathy. Bilateral skin and nipple areolar complexes are normal. Visualized portions of the chest and abdomen are unremarkable.      Right breast blue dye injection, lumpectomy, sentinel lymph node excision was performed on 07/03/2018:  A.  Breast, left, reduction mammoplasty: Benign breast tissue with fibrocystic change  Unremarkable skin    B.  [place] lymph node #1, excision: 2 lymph nodes, negative for metastatic carcinoma    C.  [place] lymph node #2, excision: 2 lymph nodes, negative for metastatic carcinoma    D.  Breast, right, oncoplastic reduction: Benign breast tissue with  fibrocystic change  Unremarkable skin    E.  Breast, right, lumpectomy: Invasive well-differentiated ductal   carcinoma (grade 1)  Ductal carcinoma in situ, intermediate nuclear grade  Lateral and posterior margins positive for invasive carcinoma  Ductal carcinoma in situ present less than 1 mm from lateral margin  Previous biopsy site identified  Fibrocystic change    F.  Right breast, additional posterior margin, excision: Fibrocystic  change  Negative for invasive carcinoma  Negative for ductal carcinoma in situ    G.  Right breast, additional medial margin, excision: Benign breast  tissue  Negative for invasive carcinoma  Negative for ductal carcinoma in situ    H.  Right breast, additional inferior margin, excision: Fibrocystic  change  Negative for invasive carcinoma  Negative for ductal carcinoma in situ    CANCER CASE SUMMARY: Tumor size 1.2 x 1 x 0.7 cm. Overall grade 1. DCIS present, negative for extensive intraductal component. Nuclear grade: Grade II (intermediate). Lobular carcinoma in situ: Not identified. Margins: Invasive carcinoma present at lateral/orange inked margin   Final posterior margin negative for carcinoma. Ductal carcinoma in situ present less than 1 mm from lateral/orange inked margin. Treatment effect: No known presurgical therapy  Pathologic stage classification (pTNM, AJCC 8th Edition): pT1c (sn)pN0 [0 out of 4 lymph nodes]     ER positive >90%  PA positive   80%  HER/2 Negative (1+)     Oncotype DX Breast Cancer Report-Node Negative   Recurrence Score Result-15 (low risk). On 07/17/2018: RIGHT breast, re-excision: Organizational changes of prior biopsy, no  evidence of residual neoplasm.  Margins of excision are negative for neoplasm.     Per Medical Oncology, Dr. Lawrence Robison:  Given low score Oncotype Dx (her score is even less than 16 per [employer] data published in ASCO 2018), No indication and No Benefit for adjuvant chemotherapy. Proceed with adjuvant RT followed by hormonal therapy     10/01/2018 completed adjuvant RT. ET:  Tamoxifen 20 mg po daily was started on 10/02/2018 with fair tolerance. Robotic TLH/BSO 11/15/2018 by Dr. Terrance Hoang at [facility]; Path negative for malignancy.     D/C Tamoxifen. -Arimidex 1 mg po daily was started on 01/07/2019 where she complained of significant myalgias. D/C Arimidex.   -Femara 2.5 mg po daily was started on 02/04/2019 which she tolerated it poorly as well. Significant arthralgias affecting quality of life.  D/C Femara.   -Tamoxifen resumed on 03/04/2019 per patient request   -She has discontinued the Tamoxifen and is declining any further Endocrine therapy due to poor approximately 2-3 weeks ago and will take her breath away. No relief with otc analgesics. Imaging reviewed and negative. Her clinically exam is stable/unremarkable. She is on active surveillance, as she was unable to tolerate her endocrine therapy. In view of worsening bony pain, will check bone scan. Plan:   1. Continue monthly breast/chest wall self examination; detailed instructions reviewed today. Bring any changes to your physician's attention. 2. Continue healthy diet and exercise routinely as tolerated. 3. Maintaining ideal body weight (20-25 BMI) may lead to optimal breast cancer outcomes. 4. Avoid alcohol. 5. Continue follow up with Medical Oncology and Primary Care. 6. Bone scan to be done at this time. 7. Further recommendations will be made once Bone Scan complete. During today's visit, face-to-face time 25 minutes, greater than 50% in counseling education and coordination of care. All questions were answered to her apparent satisfaction, and she is agreeable to the plan as outlined above. Lucrecia Licea RN, MSN, APRN-CNP, [id number]  Advanced Oncology Certified Nurse Practitioner  Department of Breast Surgery  [facility]/  [facility] in collaboration with Dr. Drew Casanova.  [place]/Dr. Loletta Sicard APRN-CNP

## 2021-08-05 ENCOUNTER — HOSPITAL ENCOUNTER (OUTPATIENT)
Dept: GENERAL RADIOLOGY | Age: 41
Discharge: HOME | End: 2021-08-07
Payer: COMMERCIAL

## 2021-08-05 ENCOUNTER — OFFICE VISIT (OUTPATIENT)
Dept: BREAST CENTER | Age: 41
End: 2021-08-05
Payer: COMMERCIAL

## 2021-08-05 VITALS
SYSTOLIC BLOOD PRESSURE: 112 MMHG | HEART RATE: 69 BPM | TEMPERATURE: 98.1 F | BODY MASS INDEX: 27.6 KG/M2 | RESPIRATION RATE: 18 BRPM | HEIGHT: 62 IN | WEIGHT: 150 LBS | DIASTOLIC BLOOD PRESSURE: 64 MMHG | OXYGEN SATURATION: 98 %

## 2021-08-05 DIAGNOSIS — Z12.31 VISIT FOR SCREENING MAMMOGRAM: Primary | ICD-10-CM

## 2021-08-05 DIAGNOSIS — Z85.3 PERSONAL HISTORY OF BREAST CANCER: ICD-10-CM

## 2021-08-05 DIAGNOSIS — R52 PAIN: Primary | ICD-10-CM

## 2021-08-05 DIAGNOSIS — N63.0 BREAST LUMP: ICD-10-CM

## 2021-08-05 PROBLEM — G89.4 CHRONIC PAIN DISORDER: Status: ACTIVE | Noted: 2017-06-22

## 2021-08-05 PROCEDURE — 99213 OFFICE O/P EST LOW 20 MIN: CPT | Performed by: NURSE PRACTITIONER

## 2021-08-05 PROCEDURE — 76642 ULTRASOUND BREAST LIMITED: CPT

## 2021-08-05 PROCEDURE — G0279 TOMOSYNTHESIS, MAMMO: HCPCS

## 2021-08-05 NOTE — PATIENT INSTRUCTIONS

## 2021-08-06 ENCOUNTER — TELEPHONE (OUTPATIENT)
Dept: BREAST CENTER | Age: 41
End: 2021-08-06

## 2021-08-06 NOTE — TELEPHONE ENCOUNTER
Patient is scheduled for NM Bone scan 8/13/21 arrival 8:45am / inj 9:00am / scan 12pm -- [facility]. Patient was instructed to enter the [facility] entrance and report to Main admitted to registrar. No prior authorization required per [employer].

## 2021-08-13 ENCOUNTER — HOSPITAL ENCOUNTER (OUTPATIENT)
Dept: NUCLEAR MEDICINE | Age: 41
Discharge: HOME | End: 2021-08-15
Payer: COMMERCIAL

## 2021-08-13 DIAGNOSIS — R52 PAIN: ICD-10-CM

## 2021-08-13 DIAGNOSIS — Z85.3 PERSONAL HISTORY OF BREAST CANCER: ICD-10-CM

## 2021-08-13 PROCEDURE — 3430000000 HC RX DIAGNOSTIC RADIOPHARMACEUTICAL: Performed by: RADIOLOGY

## 2021-08-13 PROCEDURE — 78306 BONE IMAGING WHOLE BODY: CPT

## 2021-08-13 PROCEDURE — A9503 TC99M MEDRONATE: HCPCS | Performed by: RADIOLOGY

## 2021-08-13 RX ORDER — TC 99M MEDRONATE 20 MG/10ML
25.9 INJECTION, POWDER, LYOPHILIZED, FOR SOLUTION INTRAVENOUS
Status: COMPLETED | OUTPATIENT
Start: 2021-08-13 | End: 2021-08-13

## 2021-08-13 RX ADMIN — TC 99M MEDRONATE 25.9 MILLICURIE: 20 INJECTION, POWDER, LYOPHILIZED, FOR SOLUTION INTRAVENOUS at 09:07

## 2021-12-08 ENCOUNTER — TELEPHONE (OUTPATIENT)
Dept: BREAST CENTER | Age: 41
End: 2021-12-08

## 2021-12-08 NOTE — TELEPHONE ENCOUNTER
Called patient to discuss scheduling her breast MRI. Patient states she was told she needed a mammogram and office visit in the summer. Will discuss with Jesus Thompson NP for plan moving forward.

## 2022-06-10 ASSESSMENT — ENCOUNTER SYMPTOMS
SHORTNESS OF BREATH: 0
TROUBLE SWALLOWING: 0
RHINORRHEA: 0
ABDOMINAL PAIN: 0
BACK PAIN: 0
VOMITING: 0
VOICE CHANGE: 0
CHEST TIGHTNESS: 0
NAUSEA: 0
ABDOMINAL DISTENTION: 0
SINUS PRESSURE: 0
DIARRHEA: 0
SORE THROAT: 0
WHEEZING: 0
BLOOD IN STOOL: 0
CONSTIPATION: 0
CHOKING: 0
COUGH: 0
SINUS PAIN: 0
EYE DISCHARGE: 0
EYE ITCHING: 0

## 2022-06-10 NOTE — PROGRESS NOTES
posterior shadowing and increased blood flow in the 11 o'clock position of the right breast, 10 cm from the nipple measuring 10 x 9 x 9 mm with surrounding spiculation and retraction of connective tissue.     On 05/09/2018:  Right breast, 12:00 core needle biopsy: Invasive, well-differentiated ductal carcinoma (grade 1)  Comment: Focal low-grade DCIS is also noted.  Calponin and p63 immunostains show a lack of myoepithelial layer which supports the interpretation.  The tumor has a Beaver score of 2 (tubule formation) +2 (nuclear pleomorphism) +1 (mitotic count) = 5. ER positive >90%  WI positive   80%  HER/2 Negative (1+)     CXR PA/Lateral on 05/17/2018:  No focal consolidation     Right axillary U/S on 05/17/2018: There is no axillary lymphadenopathy.     MRI Bilateral Breast 05/30/2018:  A 1.3 x 0.8 x 1.1 cm spiculated, enhancing mass is identified in the right breast 12:00 with associated artifact from a metallic biopsy clip. No suspicious mass or non-mass enhancement seen on the left. There is no lymphadenopathy. Bilateral skin and nipple areolar complexes are normal. Visualized portions of the chest and abdomen are unremarkable.      Right breast blue dye injection, lumpectomy, sentinel lymph node excision was performed on 07/03/2018:  A.  Breast, left, reduction mammoplasty: Benign breast tissue with fibrocystic change  Unremarkable skin    B.  [place] lymph node #1, excision: 2 lymph nodes, negative for metastatic carcinoma    C.  [place] lymph node #2, excision: 2 lymph nodes, negative for metastatic carcinoma    D.  Breast, right, oncoplastic reduction: Benign breast tissue with  fibrocystic change  Unremarkable skin    E.  Breast, right, lumpectomy: Invasive well-differentiated ductal   carcinoma (grade 1)  Ductal carcinoma in situ, intermediate nuclear grade  Lateral and posterior margins positive for invasive carcinoma  Ductal carcinoma in situ present less than 1 mm from lateral margin  Previous biopsy site identified  Fibrocystic change    F.  Right breast, additional posterior margin, excision: Fibrocystic  change  Negative for invasive carcinoma  Negative for ductal carcinoma in situ    G.  Right breast, additional medial margin, excision: Benign breast  tissue  Negative for invasive carcinoma  Negative for ductal carcinoma in situ    H.  Right breast, additional inferior margin, excision: Fibrocystic  change  Negative for invasive carcinoma  Negative for ductal carcinoma in situ    CANCER CASE SUMMARY  Procedure: Excision  Specimen laterality: Right  Tumor size: 1.2 x 1 x 0.7 cm (greatest dimension measured  microscopically)  Histologic type: Invasive carcinoma of no special type (ductal, not  otherwise specified)  Histologic grade ([place] histologic score)       Tubular differentiation: Score 1       Nuclear pleomorphism: Score 2       Mitotic rate: Score 1       Overall grade: Grade 1 (score 4)  Ductal carcinoma in situ: Present, negative for extensive intraductal  component       Nuclear grade: Grade II (intermediate)  Lobular carcinoma in situ: Not identified  Margins: Invasive carcinoma present at lateral/orange inked margin   Final posterior margin negative for carcinoma   Ductal carcinoma in situ present less than 1 mm from lateral/orange  inked margin  Treatment effect: No known presurgical therapy  Pathologic stage classification (pTNM, AJCC 8th Edition): pT1c (sn)pN0 [0  out of 4 lymph nodes]     ER positive >90%  DE positive   80%  HER/2 Negative (1+)     Oncotype DX Breast Cancer Report-Node Negative   Recurrence Score Result-15   Risk category-low risk     On 07/17/2018: RIGHT breast, re-excision: Organizational changes of prior biopsy, no evidence of residual neoplasm.  Margins of excision are negative for neoplasm.     -Per Medical Oncology, Dr. Hassan Ear:  No indication and No Benefit for adjuvant chemotherapy.    -RT was completed on 10/01/2018.  -ET:  Tamoxifen 20 mg po daily was started on 10/02/2018 with fair tolerance. Robotic TLH/BSO 11/15/2018 by Dr. Rickey Johnson at [facility]; Path negative for malignancy.     D/C Tamoxifen. -Arimidex 1 mg po daily was started on 01/07/2019 where she complained of significant myalgias. D/C Arimidex.   -Femara 2.5 mg po daily was started on 02/04/2019 which she tolerated it poorly as well. Significant arthralgias affecting quality of life. D/C Femara.   -Tamoxifen resumed on 03/04/2019 per patient request.  -She stopped the Tamoxifen due to poor tolerance. Declining further ET. Review of Systems   Constitutional: Negative for activity change, appetite change, chills, fatigue, fever and unexpected weight change. 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 Enjoys camping and riding [employer].  Pain in the right anterior chest wall has improved. Is on active surveillance; has not seen medical oncology in greater than 1 year. HENT: Negative for congestion, postnasal drip, rhinorrhea, sinus pressure, sinus pain, sore throat, trouble swallowing and voice change. Recent diagnosis of sinusitis; on antibiotics and Medrol Dosepak. Eyes: Negative for discharge, itching and visual disturbance. Respiratory: Negative for cough, choking, chest tightness, shortness of breath and wheezing. Cardiovascular: Negative for chest pain, palpitations and leg swelling. Gastrointestinal: Negative for abdominal distention, abdominal pain, blood in stool, constipation, diarrhea, nausea and vomiting. Endocrine: Negative for cold intolerance and heat intolerance. Genitourinary: Negative for difficulty urinating, dysuria, frequency and hematuria. Musculoskeletal: Negative for arthralgias, back pain, gait problem, joint swelling, myalgias, neck pain and neck stiffness. Scattered bone/joint pain. Allergic/Immunologic: Negative for environmental allergies and food allergies.    Neurological: Negative for dizziness, seizures, syncope, speech difficulty, weakness, light-headedness and headaches. Hematological: Negative for adenopathy. Does not bruise/bleed easily. Psychiatric/Behavioral: Negative for agitation, confusion and decreased concentration. The patient is not nervous/anxious. Objective:   Physical Exam  Vitals and nursing note reviewed. Constitutional:       General: She is not in acute distress. Appearance: She is well-developed. She is not diaphoretic. Comments: ECOG once again remains stable at 0; pleasant and conversant. HENT:      Head: Normocephalic and atraumatic. Mouth/Throat:      Pharynx: No oropharyngeal exudate. Eyes:      General: No scleral icterus. Right eye: No discharge. Left eye: No discharge. Conjunctiva/sclera: Conjunctivae normal.   Neck:      Thyroid: No thyromegaly. Vascular: No JVD. Trachea: No tracheal deviation. Comments: Multiple bilateral reactive adenopathy of the anterior cervical nodes. On the right side she has a palpable posterior cervical node. Cardiovascular:      Rate and Rhythm: Normal rate and regular rhythm. Heart sounds: Normal heart sounds. No murmur heard. No friction rub. No gallop. Pulmonary:      Effort: Pulmonary effort is normal. No respiratory distress or retractions. Breath sounds: Normal breath sounds. No stridor. No wheezing or rales. Chest:      Chest wall: No mass, lacerations, deformity, swelling, tenderness or edema. Breasts: Breasts are symmetrical.      Right: No inverted nipple, mass, nipple discharge, skin change or tenderness. Left: No inverted nipple, mass, nipple discharge, skin change or tenderness. Comments: Clinical exam is stable and without evidence of recurrent disease. Abdominal:      General: There is no distension. Palpations: Abdomen is soft. Tenderness: There is no abdominal tenderness. There is no guarding or rebound.    Musculoskeletal: General: No tenderness or deformity. Right shoulder: Decreased range of motion (Has a history of shoulder injury in the past.  Has limited ROM of RUE.). Left shoulder: Normal.      Cervical back: Normal range of motion and neck supple. Lymphadenopathy:      Cervical: No cervical adenopathy. Right cervical: No superficial, deep or posterior cervical adenopathy. Left cervical: No superficial, deep or posterior cervical adenopathy. Upper Body:      Right upper body: No pectoral adenopathy. Left upper body: No pectoral adenopathy. Skin:     General: Skin is warm and dry. Coloration: Skin is not pale. Findings: No erythema or rash. Neurological:      Mental Status: She is alert and oriented to person, place, and time. Coordination: Coordination normal.   Psychiatric:         Behavior: Behavior normal.         Thought Content: Thought content normal.         Judgment: Judgment normal.        Assessment:    Con Munson is a 37 y.o. extremely pleasant female with right breast cancer, well-differentiated, ER/TX positive HER-2/marc negative disease. Bilateral Diagnostic Mammogram 05/03/2018: A spiculated mass is seen in the upper outer quadrant of the right breast in approximately the 11 o'clock position underlying the marked area of palpable concern. The spiculation surrounding the mass overall measures 3 cm diameter by mammography.       Right axillary U/S on 05/17/2018: There is no axillary lymphadenopathy. 05/09/2018 Right breast, 12:00 core needle biopsy: Invasive, well-differentiated ductal carcinoma (grade 1)  Comment: Focal low-grade DCIS is also noted.  Calponin and p63 immunostains show a lack of myoepithelial layer which supports the interpretation.  The tumor has a Melany score of 2 (tubule formation) +2 (nuclear pleomorphism) +1 (mitotic count) = 5.     ER positive >90%  TX positive   80%  HER/2 Negative (1+)     05/30/2018 MRI Bilateral Breast for carcinoma. Ductal carcinoma in situ present less than 1 mm from lateral/orange inked margin. Treatment effect: No known presurgical therapy  Pathologic stage classification (pTNM, AJCC 8th Edition): pT1c (sn)pN0 [0 out of 4 lymph nodes]     ER positive >90%  IA positive   80%  HER/2 Negative (1+)     Oncotype DX Breast Cancer Report-Node Negative  Recurrence Score Result-15 (low risk). 07/17/2018: RIGHT breast, re-excision: Organizational changes of prior biopsy, no evidence of residual neoplasm.  Margins of excision are negative for neoplasm. 10/01/2018 completed adjuvant RT.  10/02/2018 Started Tamoxifen 20 mg po daily  11/15/2018 Robotic TLH/BSO by Dr. Grady Morales at [facility]; Path negative for malignancy.   Poor tolerance to Tamoxifen. D/C Tamoxifen.  -01/07/2019 started Arimidex 1 mg po daily. Significant myalgias. D/C Arimidex. -02/04/2019 started result 2.5 mg daily. Poor tolerance. DC letrozole. -Tamoxifen resumed on 03/04/2019 per patient request.    -She has discontinued the Tamoxifen and is declining any further Endocrine therapy due to poor tolerance (fatigue, malaise, aches). -Opted for observation only. Total endocrine therapy approximately 6 months. 04/08/2019 bilateral screening mammogram Benign. 10/24/2019 MRI bilateral breasts: Linear non-mass enhancement left upper outer breast.  BI-RADS 3, probably benign  10/30/2019 follow up. She felt the left breast mass has enlarged in size. Clinically, she has a nodular density at the 1:00 position (2 cm FTN) and the 3:00 position (1 cm FTN) of the left breast.    10/30/2019 Left breast US noted an oval solid mass measuring 6 mm in the left breast with significant vascularity; biopsy recommended. 10/30/2019 left breast, 3 o'clock position core biopsy:  Fat necrosis with foreign body reaction and calcification; Negative for epithelial hyperplasia or neoplasia. 12/16/2019 MRI LS Negative for metastatic disease.   06/09/2020: Bilateral Mammogram [facility] was negative. 07/07/2020 MRI bilateral breast: No convincing evidence of neoplasm. BI-RADS 3.  06/17/2021 bilateral screening mammogram: Negative, BI-RADS 1. Clinical follow up is without evidence of recurrent disease. Imaging reviewed, including her BI-RADS result. In view of history of breast cancer, poor tolerance to endocrine therapy, and dense breast tissue, recommend MRI screening in 6 months. 08/05/2021 Right Diagnostic mammogram and right breast ultrasound for complaints of new lump: Negative, BI-RADS 1. Clinical follow-up she c/o scattered joint pain and pain of the anterior ribs/chest.  Noted it started approximately 2-3 weeks ago and will take her breath away. No relief with otc analgesics. Imaging reviewed and negative. Her clinically exam is stable/unremarkable. She is on active surveillance, as she was unable to tolerate her endocrine therapy. In view of worsening bony pain, will check bone scan.     08/13/2021 nuclear medicine bone scan: No convincing evidence of metastatic disease. 06/20/2022 bilateral screening mammogram: Negative, BI-RADS 1. Clinical follow-up is without evidence of recurrent disease. Imaging reviewed, including her BI-RADS result. She has opted for active surveillance secondary to poor tolerance to endocrine therapy. She no longer follows with medical oncology therefore, will check CBC/CMP today. She has a right posterior cervical node that is firm and nontender. In addition, she has multiple, palpable anterior cervical nodes; likely reactive to her recent episode of sinusitis. However, will check ultrasound of the neck once antibiotics and Medrol Dosepak are complete ensure resolution. Plan:   1. Continue monthly breast/chest wall self examination; detailed instructions reviewed today. Bring any changes to your physician's attention. 2. Continue healthy diet and exercise routinely as tolerated.     3. Maintaining ideal body weight (20-25 BMI) may lead to optimal breast cancer outcomes. 4. Avoid alcohol. 5. Continue follow up with Medical Oncology (she will call the office for an apt), Primary Care, and all specialties as directed. 6. CBC CMP today. 7. Ultrasound soft tissue neck for right cervical lymphadenopathy. 8. RTC 6 months for clinical exam.      I spent a total of 26 minutes on the date of the service which included preparing to see the patient, face-to-face patient care, completing clinical documentation, obtaining and/or reviewing separately obtained history, performing a medically appropriate examination, counseling and educating the patient/family/caregiver, ordering medications, tests, or procedures, communicating with other HCPs (not separately reported), independently interpreting results (not separately reported), communicating results to the patient/family/caregiver and care coordination (not separately reported). Trevon Bernard, RN, MSN, APRN-CNP, [address]  Advanced Oncology Certified Nurse Practitioner  Department of Breast Surgery  [facility]/  Care in collaboration with Dr. Drew Casanova.  [place]/Dr. Ana Umanzor/Dr. Sudhir Foster APRN-CNP

## 2022-06-20 ENCOUNTER — OFFICE VISIT (OUTPATIENT)
Dept: BREAST CENTER | Age: 42
End: 2022-06-20
Payer: COMMERCIAL

## 2022-06-20 ENCOUNTER — HOSPITAL ENCOUNTER (OUTPATIENT)
Dept: GENERAL RADIOLOGY | Age: 42
Discharge: HOME | End: 2022-06-22
Payer: COMMERCIAL

## 2022-06-20 VITALS
WEIGHT: 142.8 LBS | OXYGEN SATURATION: 98 % | RESPIRATION RATE: 22 BRPM | SYSTOLIC BLOOD PRESSURE: 118 MMHG | DIASTOLIC BLOOD PRESSURE: 70 MMHG | TEMPERATURE: 98.4 F | HEIGHT: 62 IN | HEART RATE: 84 BPM | BODY MASS INDEX: 26.28 KG/M2

## 2022-06-20 VITALS — WEIGHT: 145 LBS | BODY MASS INDEX: 26.68 KG/M2 | HEIGHT: 62 IN

## 2022-06-20 DIAGNOSIS — Z12.31 VISIT FOR SCREENING MAMMOGRAM: ICD-10-CM

## 2022-06-20 DIAGNOSIS — Z85.3 PERSONAL HISTORY OF BREAST CANCER: ICD-10-CM

## 2022-06-20 DIAGNOSIS — Z85.3 PERSONAL HISTORY OF BREAST CANCER: Primary | ICD-10-CM

## 2022-06-20 DIAGNOSIS — M89.8X9 BONE PAIN: ICD-10-CM

## 2022-06-20 DIAGNOSIS — R59.9 PALPABLE LYMPH NODE: Primary | ICD-10-CM

## 2022-06-20 DIAGNOSIS — R74.8 ELEVATED ALKALINE PHOSPHATASE LEVEL: ICD-10-CM

## 2022-06-20 LAB
ALBUMIN SERPL-MCNC: 4.6 G/DL (ref 3.5–5.2)
ALP BLD-CCNC: 193 U/L (ref 35–104)
ALT SERPL-CCNC: 11 U/L (ref 0–32)
ANION GAP SERPL CALCULATED.3IONS-SCNC: 15 MMOL/L (ref 7–16)
AST SERPL-CCNC: 16 U/L (ref 0–31)
BASOPHILS ABSOLUTE: 0.03 E9/L (ref 0–0.2)
BASOPHILS RELATIVE PERCENT: 0.2 % (ref 0–2)
BILIRUB SERPL-MCNC: 0.4 MG/DL (ref 0–1.2)
BUN BLDV-MCNC: 9 MG/DL (ref 6–20)
CALCIUM SERPL-MCNC: 9.6 MG/DL (ref 8.6–10.2)
CHLORIDE BLD-SCNC: 108 MMOL/L (ref 98–107)
CO2: 23 MMOL/L (ref 22–29)
CREAT SERPL-MCNC: 0.6 MG/DL (ref 0.5–1)
EOSINOPHILS ABSOLUTE: 0.01 E9/L (ref 0.05–0.5)
EOSINOPHILS RELATIVE PERCENT: 0.1 % (ref 0–6)
GFR AFRICAN AMERICAN: >60
GFR NON-AFRICAN AMERICAN: >60 ML/MIN/1.73
GLUCOSE BLD-MCNC: 97 MG/DL (ref 74–99)
HCT VFR BLD CALC: 42.5 % (ref 34–48)
HEMOGLOBIN: 13.7 G/DL (ref 11.5–15.5)
IMMATURE GRANULOCYTES #: 0.09 E9/L
IMMATURE GRANULOCYTES %: 0.6 % (ref 0–5)
LYMPHOCYTES ABSOLUTE: 1.27 E9/L (ref 1.5–4)
LYMPHOCYTES RELATIVE PERCENT: 8.7 % (ref 20–42)
MCH RBC QN AUTO: 29.8 PG (ref 26–35)
MCHC RBC AUTO-ENTMCNC: 32.2 % (ref 32–34.5)
MCV RBC AUTO: 92.6 FL (ref 80–99.9)
MONOCYTES ABSOLUTE: 0.25 E9/L (ref 0.1–0.95)
MONOCYTES RELATIVE PERCENT: 1.7 % (ref 2–12)
NEUTROPHILS ABSOLUTE: 12.97 E9/L (ref 1.8–7.3)
NEUTROPHILS RELATIVE PERCENT: 88.7 % (ref 43–80)
PDW BLD-RTO: 13.2 FL (ref 11.5–15)
PLATELET # BLD: 433 E9/L (ref 130–450)
PMV BLD AUTO: 9.4 FL (ref 7–12)
POTASSIUM SERPL-SCNC: 3.8 MMOL/L (ref 3.5–5)
RBC # BLD: 4.59 E12/L (ref 3.5–5.5)
SODIUM BLD-SCNC: 146 MMOL/L (ref 132–146)
TOTAL PROTEIN: 8 G/DL (ref 6.4–8.3)
WBC # BLD: 14.6 E9/L (ref 4.5–11.5)

## 2022-06-20 PROCEDURE — 99213 OFFICE O/P EST LOW 20 MIN: CPT | Performed by: NURSE PRACTITIONER

## 2022-06-20 PROCEDURE — 36415 COLL VENOUS BLD VENIPUNCTURE: CPT

## 2022-06-20 PROCEDURE — 36415 COLL VENOUS BLD VENIPUNCTURE: CPT | Performed by: NURSE PRACTITIONER

## 2022-06-20 PROCEDURE — 77063 BREAST TOMOSYNTHESIS BI: CPT

## 2022-06-20 PROCEDURE — 99213 OFFICE O/P EST LOW 20 MIN: CPT

## 2022-06-20 PROCEDURE — 85025 COMPLETE CBC W/AUTO DIFF WBC: CPT

## 2022-06-20 PROCEDURE — 80053 COMPREHEN METABOLIC PANEL: CPT

## 2022-06-20 RX ORDER — AMOXICILLIN AND CLAVULANATE POTASSIUM 875; 125 MG/1; MG/1
TABLET, FILM COATED ORAL
COMMUNITY
Start: 2022-06-19

## 2022-06-20 RX ORDER — METHYLPREDNISOLONE 4 MG/1
4 TABLET ORAL SEE ADMIN INSTRUCTIONS
COMMUNITY

## 2022-06-20 NOTE — PROGRESS NOTES
Alk Phos is elevated at 193; spoke to [place]. Historically:     193 on 06/20/2022  185 on 05/03/2021  142 on 11/09/2020  140 on 05/11/2020  167 on 01/08/2020 08/13/2020 bone scan:    New asymmetric activity at the left ankle, likely due to healing trauma or   asymmetric arthropathy, less likely metastatic disease given location and   lack of findings elsewhere.  If patient is focally symptomatic, radiograph   could be considered.       Multifocal degenerative changes are otherwise favored as outlined above. She has c/o chronic bone pain. We reviewed the elevated alkaline phosphatase in detail on this visit. She reports a history of tick bite which resulted in extreme inflammatory changes and elevated LFTs in the past.  In view of the increasing alkaline phosphatase levels, bone pain, and right posterior cervical adenopathy, will check restaging scans + US soft tissue neck. We reviewed the importance of medical oncology follow-up as directed. Will plan re-staging scans (CT chest, Abdomen/Pelvis, and Bone scan). All questions were answered to her apparent satisfaction. Lanell Goldmann Anton Ego) Jeral Perna, RN, MSN, APRN-CNP, [address]  Advanced Oncology Certified Nurse Practitioner  Department of Breast Surgery  [facility] [place]/  [facility] [place] in collaboration with Dr. Marian Najjar.  [place]/Dr. Mike Umanzor/Dr. Felicia Blackburn APRN-CNP

## 2022-06-20 NOTE — PATIENT INSTRUCTIONS
Patient is scheduled for US Neck 6/30/22 @ 1:00pm arrival 12:30pm main admitting - [facility]

## 2022-06-22 ENCOUNTER — TELEPHONE (OUTPATIENT)
Dept: BREAST CENTER | Age: 42
End: 2022-06-22

## 2022-06-22 NOTE — TELEPHONE ENCOUNTER
TAM Deras contacted [facility] for prior authorization of outpatient imaging. A prior authorization needed for CT C/A/P (80030 & 25141)  at [facility] in [place]. No authorization is needed for [place] Bone Scan (83489). RN Spoke with Danielle West, nurse reviewer. Authorization number [id number] good from 06/22/2022-7/21/2022. RN scanned authorization form in media tab.     Electronically signed by Breann Zapien RN on 6/22/22 at 2:51 PM EDT'

## 2022-06-30 ENCOUNTER — HOSPITAL ENCOUNTER (OUTPATIENT)
Dept: ULTRASOUND IMAGING | Age: 42
Discharge: HOME | End: 2022-07-02
Payer: COMMERCIAL

## 2022-06-30 DIAGNOSIS — Z85.3 PERSONAL HISTORY OF BREAST CANCER: ICD-10-CM

## 2022-06-30 DIAGNOSIS — R59.9 PALPABLE LYMPH NODE: ICD-10-CM

## 2022-06-30 PROCEDURE — 76536 US EXAM OF HEAD AND NECK: CPT

## 2022-07-01 ENCOUNTER — HOSPITAL ENCOUNTER (OUTPATIENT)
Dept: CT IMAGING | Age: 42
Discharge: HOME | End: 2022-07-03
Payer: COMMERCIAL

## 2022-07-01 ENCOUNTER — HOSPITAL ENCOUNTER (OUTPATIENT)
Age: 42
Discharge: HOME | End: 2022-07-01
Payer: COMMERCIAL

## 2022-07-01 ENCOUNTER — HOSPITAL ENCOUNTER (OUTPATIENT)
Dept: NUCLEAR MEDICINE | Age: 42
Discharge: HOME | End: 2022-07-03
Payer: COMMERCIAL

## 2022-07-01 DIAGNOSIS — R74.8 ELEVATED ALKALINE PHOSPHATASE LEVEL: ICD-10-CM

## 2022-07-01 DIAGNOSIS — E01.0 THYROMEGALY: Primary | ICD-10-CM

## 2022-07-01 DIAGNOSIS — Z85.3 PERSONAL HISTORY OF BREAST CANCER: ICD-10-CM

## 2022-07-01 DIAGNOSIS — M89.8X9 BONE PAIN: ICD-10-CM

## 2022-07-01 DIAGNOSIS — E01.0 THYROMEGALY: ICD-10-CM

## 2022-07-01 LAB
T3 TOTAL: 122 NG/DL (ref 80–200)
T4 FREE: 1.25 NG/DL (ref 0.93–1.7)
TSH SERPL DL<=0.05 MIU/L-ACNC: 0.58 UIU/ML (ref 0.27–4.2)

## 2022-07-01 PROCEDURE — 78306 BONE IMAGING WHOLE BODY: CPT | Performed by: RADIOLOGY

## 2022-07-01 PROCEDURE — 84443 ASSAY THYROID STIM HORMONE: CPT

## 2022-07-01 PROCEDURE — 6360000004 HC RX CONTRAST MEDICATION: Performed by: RADIOLOGY

## 2022-07-01 PROCEDURE — 3430000000 HC RX DIAGNOSTIC RADIOPHARMACEUTICAL: Performed by: RADIOLOGY

## 2022-07-01 PROCEDURE — A9503 TC99M MEDRONATE: HCPCS | Performed by: RADIOLOGY

## 2022-07-01 PROCEDURE — 36415 COLL VENOUS BLD VENIPUNCTURE: CPT

## 2022-07-01 PROCEDURE — 71270 CT THORAX DX C-/C+: CPT

## 2022-07-01 PROCEDURE — 74177 CT ABD & PELVIS W/CONTRAST: CPT

## 2022-07-01 PROCEDURE — 84439 ASSAY OF FREE THYROXINE: CPT

## 2022-07-01 PROCEDURE — 78306 BONE IMAGING WHOLE BODY: CPT

## 2022-07-01 PROCEDURE — 84480 ASSAY TRIIODOTHYRONINE (T3): CPT

## 2022-07-01 RX ORDER — TC 99M MEDRONATE 20 MG/10ML
25 INJECTION, POWDER, LYOPHILIZED, FOR SOLUTION INTRAVENOUS
Status: COMPLETED | OUTPATIENT
Start: 2022-07-01 | End: 2022-07-01

## 2022-07-01 RX ADMIN — TC 99M MEDRONATE 25 MILLICURIE: 20 INJECTION, POWDER, LYOPHILIZED, FOR SOLUTION INTRAVENOUS at 08:48

## 2022-07-01 RX ADMIN — IOHEXOL 50 ML: 240 INJECTION, SOLUTION INTRATHECAL; INTRAVASCULAR; INTRAVENOUS; ORAL at 10:16

## 2022-07-01 RX ADMIN — IOPAMIDOL 75 ML: 755 INJECTION, SOLUTION INTRAVENOUS at 10:16

## 2022-07-01 NOTE — PROGRESS NOTES
Thyroid enlarged on US. Will check Thyroid studies. Ludwig Isabel, RN, MSN, APRN-CNP, [address]  Advanced Oncology Certified Nurse Practitioner  Department of Breast Surgery  [facility]/  [facility] in collaboration with Dr. Latoya Almaraz.  [place]/Dr. Katlyn Umanzor/Dr. Douglas Gutierrez APRN-CNP

## 2022-07-21 ENCOUNTER — OFFICE VISIT (OUTPATIENT)
Dept: ONCOLOGY | Age: 42
End: 2022-07-21
Payer: COMMERCIAL

## 2022-07-21 ENCOUNTER — HOSPITAL ENCOUNTER (OUTPATIENT)
Dept: INFUSION THERAPY | Age: 42
Discharge: HOME | End: 2022-07-21

## 2022-07-21 VITALS
OXYGEN SATURATION: 97 % | BODY MASS INDEX: 26.72 KG/M2 | SYSTOLIC BLOOD PRESSURE: 103 MMHG | HEIGHT: 62 IN | TEMPERATURE: 98.4 F | HEART RATE: 73 BPM | WEIGHT: 145.2 LBS | DIASTOLIC BLOOD PRESSURE: 62 MMHG

## 2022-07-21 DIAGNOSIS — Z85.3 PERSONAL HISTORY OF BREAST CANCER: Primary | ICD-10-CM

## 2022-07-21 DIAGNOSIS — Z12.31 SCREENING MAMMOGRAM FOR HIGH-RISK PATIENT: ICD-10-CM

## 2022-07-21 PROCEDURE — 99213 OFFICE O/P EST LOW 20 MIN: CPT

## 2022-07-21 PROCEDURE — 99215 OFFICE O/P EST HI 40 MIN: CPT | Performed by: INTERNAL MEDICINE

## 2022-07-21 NOTE — PROGRESS NOTES
Department of [facility] Oncology  Attending Clinic Note    Reason for Visit:  Follow-up on a patient with Right Breast Cancer    PCP:  Emile Smith MD    History of Present Illness: The mass was located in the 12 o'clock position of right breast    Breast cancer risk factors include family hx on father's side, delayed child bearing, menarche before age 16, family hx of ovarian CA and gender. Age of menarche was 7. Last menstrual period was Mid month April. Patient has hormonal therapy, recent Depo, has been on OCPs or similar since 19y until 36y. Patient is . Patient denies nipple discharge    Bilateral Diagnostic Mammogram 2018: A spiculated mass is seen in the upper outer quadrant of the right breast in approximately the 11 o'clock position underlying the marked area of palpable concern. The spiculation surrounding the mass overall measures 3 cm diameter by mammography. Right Breast Ultrasound on 2018: Taller than wide macro-lobulated and micro-lobulated mass with posterior shadowing and increased blood flow in the 11 o'clock position of the right breast, 10 cm from the nipple measuring 10 x 9 x 9 mm with surrounding spiculation and retraction of connective tissue. On 2018:  Right breast, 12:00 core needle biopsy: Invasive, well-differentiated ductal carcinoma (grade 1)  Comment: Focal low-grade DCIS is also noted. Calponin and p63 immunostains show a lack of myoepithelial layer which supports the interpretation. The tumor has a [place] score of 2 (tubule formation) +2 (nuclear pleomorphism) +1 (mitotic count) = 5. ER positive >90%  [place] positive   80%  HER/2 Negative (1+)    CXR PA/Lateral on 2018:  No focal consolidation    Right axillary U/S on 2018: There is no axillary lymphadenopathy.     MRI Bilateral Breast 2018:  A 1.3 x 0.8 x 1.1 cm spiculated, enhancing mass is identified in the right breast 12:00 with associated artifact from a metallic biopsy clip. No suspicious mass or non-mass enhancement seen on the left. There is no lymphadenopathy. Bilateral skin and nipple areolar complexes are normal. Visualized portions of the chest and abdomen are unremarkable. Right breast blue dye injection, lumpectomy, sentinel lymph node excision was performed on 07/03/2018:  A. Breast, left, reduction mammoplasty: Benign breast tissue with fibrocystic change  Unremarkable skin    B. [place] lymph node #1, excision: 2 lymph nodes, negative for metastatic carcinoma    C. [place] lymph node #2, excision: 2 lymph nodes, negative for metastatic carcinoma    D. Breast, right, oncoplastic reduction: Benign breast tissue with  fibrocystic change  Unremarkable skin    E.  Breast, right, lumpectomy: Invasive well-differentiated ductal   carcinoma (grade 1)  Ductal carcinoma in situ, intermediate nuclear grade  Lateral and posterior margins positive for invasive carcinoma  Ductal carcinoma in situ present less than 1 mm from lateral margin  Previous biopsy site identified  Fibrocystic change    F. Right breast, additional posterior margin, excision: Fibrocystic  change  Negative for invasive carcinoma  Negative for ductal carcinoma in situ    G. Right breast, additional medial margin, excision: Benign breast  tissue  Negative for invasive carcinoma  Negative for ductal carcinoma in situ    H. Right breast, additional inferior margin, excision: Fibrocystic  change  Negative for invasive carcinoma  Negative for ductal carcinoma in situ    CANCER CASE SUMMARY  Procedure: Excision  Specimen laterality: Right  Tumor size: 1.2 x 1 x 0.7 cm (greatest dimension measured  microscopically)  Histologic type:  Invasive carcinoma of no special type (ductal, not  otherwise specified)  Histologic grade (Mel[place] histologic score)       Tubular differentiation: Score 1       Nuclear pleomorphism: Score 2       Mitotic rate: Score 1       Overall grade: Grade 1 (score 4)  Ductal carcinoma in situ: Present, negative for extensive intraductal  component       Nuclear grade: Grade II (intermediate)  Lobular carcinoma in situ: Not identified  Margins: Invasive carcinoma present at lateral/orange inked margin   Final posterior margin negative for carcinoma   Ductal carcinoma in situ present less than 1 mm from lateral/orange  inked margin  Treatment effect: No known presurgical therapy  Pathologic stage classification (pTNM, AJCC 8th Edition): pT1c (sn)pN0 [0  out of 4 lymph nodes]    ER positive >90%  MA positive   80%  HER/2 Negative (1+)    Oncotype DX Breast Cancer Report-Node Negative   Recurrence Score Result-15   Risk category-low risk    On 07/17/2018: Left breast, re-excision: Organizational changes of prior biopsy, no  evidence of residual neoplasm. Margins of excision are negative for neoplasm. Given low score Oncotype Dx (her score is even less than 16 per [employer] data published in ASCO 2018), No indication and No Benefit for adjuvant chemotherapy. Proceed with adjuvant RT followed by hormonal therapy    RT was completed on 10/01/2018. Tamoxifen 20 mg po daily was started on 10/02/2018 with fair tolerance. Robotic TLH/BSO 11/15/2018 by Dr. Lc Nevarez at [facility]; Path negative for malignancy. D/C Tamoxifen. We recommended Arimidex 1 mg po daily. Side effects of Arimidex reviewed with patient. She agreed to proceed. Ca/VitD while on Arimidex. Arimidex 1 mg po daily was started on 01/07/2019 where she complained of significant myalgias. D/C Arimidex. Recommended Femara instead. Femara 2.5 mg po daily was started on 02/04/2019 which she tolerated it poorly as well. Significant arthralgias affecting quality of life. D/C Femara. Patient wanted to go back on Tamoxifen. Tamoxifen 20 mg po daily was re-started on 03/04/2019 with poor tolerance (significant arthralgias affecting quality of life). D/C Tamoxifen. We recommended Exemestane 25 mg po daily.  Side effects of Exemestane reviewed with patient. She agreed to proceed. Exemestane 25 mg po daily was started on 04/08/2019 she also tolerated poorly (significant arthralgias affecting quality of life). Patient d/c Exemestane and wanted to be observation only. She presents today 07/21/2022 for observation. No fever chills. Fair appetite and energy level. Intermittent arthralgias. No N.V.     Review of Systems;  CONSTITUTIONAL: No fever, chills. Fair appetite and energy level. ENMT: Eyes: No diplopia; Nose: No epistaxis. Mouth: No sore throat. RESPIRATORY: No hemoptysis, shortness of breath, cough. CARDIOVASCULAR: No chest pain, palpitations. GASTROINTESTINAL: No nausea/vomiting, diarrhea/constipation. GENITOURINARY: No dysuria, urinary frequency, hematuria. MSK: Intermittent arthralgias  NEURO: No syncope, presyncope, headache. Remainder: ROS NEGATIVE    Past Medical History:      Diagnosis Date    Arthritis     Breast cancer ([facility] [address]     Cancer ([facility] 2018    breast cancer--right breast, ER/[place]+ Her2-    History of therapeutic radiation     Necrotizing granulomatous inflammation of lung (HCC)     states necrotizing granulomatous of the abdomen, not the lung     Medications:  Reviewed and reconciled. Allergies: Allergies   Allergen Reactions    Ultram [Tramadol Hcl]      confused     Physical Exam:  /62   Pulse 73   Temp 98.4 °F (36.9 °C)   Ht 5' 2\" (1.575 m)   Wt 145 lb 3.2 oz (65.9 kg)   LMP 05/16/2018 (LMP Unknown)   SpO2 97%   BMI 26.56 kg/m²   GENERAL: Alert, oriented x 3, not in acute distress. LUNGS: CTA Vipul  CVS: RRR  EXTREMITIES: Without clubbing, cyanosis, or edema.    ECOG PS 1    Lab Results   Component Value Date    WBC 14.6 (H) 06/20/2022    HGB 13.7 06/20/2022    HCT 42.5 06/20/2022    MCV 92.6 06/20/2022     06/20/2022     Lab Results   Component Value Date     06/20/2022    K 3.8 06/20/2022     (H) 06/20/2022    CO2 23 06/20/2022    BUN 9 06/20/2022 CREATININE 0.6 06/20/2022    GLUCOSE 97 06/20/2022    CALCIUM 9.6 06/20/2022    PROT 8.0 06/20/2022    LABALBU 4.6 06/20/2022    BILITOT 0.4 06/20/2022    ALKPHOS 193 (H) 06/20/2022    AST 16 06/20/2022    ALT 11 06/20/2022    LABGLOM >60 06/20/2022    GFRAA >60 06/20/2022     Impression/Plan:  37 y.o. female who underwent Right breast blue dye injection, lumpectomy, sentinel lymph node excision on 07/03/2018:  A. Breast, left, reduction mammoplasty: Benign breast tissue with fibrocystic change  Unremarkable skin    B. [place] lymph node #1, excision: 2 lymph nodes, negative for metastatic carcinoma    C. [place] lymph node #2, excision: 2 lymph nodes, negative for metastatic carcinoma    D. Breast, right, oncoplastic reduction: Benign breast tissue with fibrocystic change  Unremarkable skin    E.  Breast, right, lumpectomy: Invasive well-differentiated ductal carcinoma (grade 1)  Ductal carcinoma in situ, intermediate nuclear grade  Lateral and posterior margins positive for invasive carcinoma  Ductal carcinoma in situ present less than 1 mm from lateral margin  Previous biopsy site identified  Fibrocystic change    F. Right breast, additional posterior margin, excision: Fibrocystic change  Negative for invasive carcinoma  Negative for ductal carcinoma in situ    G. Right breast, additional medial margin, excision: Benign breast tissue  Negative for invasive carcinoma  Negative for ductal carcinoma in situ    H. Right breast, additional inferior margin, excision: Fibrocystic change  Negative for invasive carcinoma  Negative for ductal carcinoma in situ    CANCER CASE SUMMARY  Procedure: Excision  Specimen laterality: Right  Tumor size: 1.2 x 1 x 0.7 cm (greatest dimension measured microscopically)  Histologic type:  Invasive carcinoma of no special type (ductal, not otherwise specified)  Histologic grade ([place] histologic score)       Tubular differentiation: Score 1       Nuclear pleomorphism: Score 2       Mitotic rate: Score 1       Overall grade: Grade 1 (score 4)  Ductal carcinoma in situ: Present, negative for extensive intraductal component       Nuclear grade: Grade II (intermediate)  Lobular carcinoma in situ: Not identified  Margins: Invasive carcinoma present at lateral/orange inked margin   Final posterior margin negative for carcinoma   Ductal carcinoma in situ present less than 1 mm from lateral/orange inked margin  Treatment effect: No known presurgical therapy  Pathologic stage classification (pTNM, AJCC 8th Edition):   pT1c (sn)pN0 [0 out of 4 lymph nodes]    ER positive >90%  VA positive   80%  HER/2 Negative (1+)    Oncotype DX Breast Cancer Report-Node Negative   Recurrence Score Result-15   Risk category-low risk    On 07/17/2018: Left breast, re-excision: Organizational changes of prior biopsy, no  evidence of residual neoplasm. Margins of excision are negative for neoplasm. Given low score Oncotype Dx (her score is even less than 16 per [employer] data published in ASCO 2018), No indication and No Benefit for adjuvant chemotherapy. Proceed with adjuvant RT followed by hormonal therapy. RT was completed on 10/01/2018. Tamoxifen 20 mg po daily was started on 10/02/2018 with fair tolerance. Robotic TLH/BSO 11/15/2018 by Dr. Sasha Rod at [facility]; Path negative for malignancy. D/C Tamoxifen. We recommended Arimidex 1 mg po daily. Side effects of Arimidex reviewed with patient. She agreed to proceed. Ca/VitD while on Arimidex. DEXA 02/04/2019 normal in LS and hips. Arimidex 1 mg po daily was started on 01/07/2019 where she complained of significant myalgias affecting quality of life. D/C Arimidex. Recommended Femara instead. Femara 2.5 mg po daily was started on 02/04/2019 which she tolerated it poorly as well. Significant arthralgias affecting quality of life. D/C Femara. Patient wanted to go back on Tamoxifen.    Tamoxifen 20 mg po daily was re-started on 03/04/2019 with poor tolerance (significant arthralgias affecting quality of life). D/C Tamoxifen. We recommended Exemestane 25 mg po daily. Side effects of Exemestane reviewed with patient. She agreed to proceed. Exemestane 25 mg po daily was started on 04/08/2019 which she also tolerated poorly (significant arthralgias affecting quality of life). Patient d/c Exemestane and wanted to be observation only. Bilateral Diagnostic Mammogram/Left Breast 04/08/2019 negative for malignancy. Bilateral Breast MRI 10/24/2019: Linear non mass enhancement in the upper outer left breast is probably benign. It is likely due to postsurgical changes/fat necrosis. No evidence of neoplasm on the right. Left Breast US 10/30/2019: There is an oval solid mass measuring 6 mm seen in the left breast. Internal echogenicity is isoechoic. The finding shows no significant increase in vascularity. Left Breast Ultrasound Core Needle Biopsy 10/30/2019:   Left breast, 3 o'clock position, core biopsy: Fat necrosis with foreign body reaction and focal calcification. Negative for epithelial hyperplasia or neoplasia. Bilateral Screening Mammogram 06/09/2020 No mammographic evidence of malignancy. MRI Bilateral Breast 07/07/2020 no convincing evidence of neoplasm bilaterally. Bilateral Screening Mammogram 06/17/2021: No evidence of malignancy  Right Diagnostic Mammogram/US 08/05/2021: No mammographic or sonographic evidence of malignancy in the right breast    Bone scan 08/13/2021: New asymmetric activity at the left ankle, likely due to healing trauma or asymmetric arthropathy. Multifocal degenerative changes     Bilateral Screening Mammogram 06/20/2022: No evidence of malignancy. US Head Neck Soft tissue 06/30/2022: Mild thyroid heterogeneity. Normal vascularity. No discrete nodules. Palpable lump in the left neck soft tissues corresponds to a morphologically normal appearing 17 mm lymph node.   Cortex does not appear thickened on this exam    CT chest 7/1/2022: No evidence of metastatic disease  CT abdomen pelvis: No evidence of metastatic disease. Nonspecific densities in the pelvis and right proximal femur including pubic symphysis region consistent with arthritic changes. Imaging reviewed. Labs reviewed. Breast surgery notes reviewed    NILDA. Patient wants to be on observation only. RTC in 6 months.      Qing Molina MD   7/21/2022

## 2022-10-24 DIAGNOSIS — Z85.3 PERSONAL HISTORY OF BREAST CANCER: ICD-10-CM

## 2022-10-24 DIAGNOSIS — Z12.31 SCREENING MAMMOGRAM FOR HIGH-RISK PATIENT: Primary | ICD-10-CM

## 2022-12-14 ENCOUNTER — TELEPHONE (OUTPATIENT)
Dept: BREAST CENTER | Age: 42
End: 2022-12-14

## 2022-12-14 NOTE — TELEPHONE ENCOUNTER
Called and attempted to leave message to switch patient to Monday 12//19/22. Mailbox is full.      Electronically signed by Paula Allison RN on 12/14/22 at 8:24 AM EST

## 2023-01-06 ASSESSMENT — ENCOUNTER SYMPTOMS
BACK PAIN: 0
COUGH: 0
SHORTNESS OF BREATH: 0

## 2023-01-06 NOTE — PROGRESS NOTES
Summary:  Right breast, 12:00:  Invasive, well-differentiated ductal carcinoma (grade 1)  Comment: Focal low-grade DCIS is also noted. Calponin and p63 immunostains show a lack of myoepithelial layer which supports the interpretation. The tumor has a Melany score of 2 (tubule formation) +2 (nuclear pleomorphism) +1 (mitotic count) = 5. ER positive >90%  NM positive   80%  HER/2 Negative (1+)  -Oncotype Dx recurrence score: 15  -RT was completed on 10/01/2018. Multiple endocrine therapies. Poor tolerance. On observation. This note was copied forward from the last encounter. Essential components for this patient record were reviewed and verified on this visit including:  recent hospitalizations, recent imaging, PMH, PSH, FH, SOC HX, Allergies, and Medications were reviewed and updated as appropriate. In addition, the assessment and plan were copied from prior office note and updated accordingly. Patient ID: Ebonie Reilly is a 40 y.o. female. 1980    PCP:  Hailey Lozano MD,    HPI     The mass was located in the 12 o'clock position of right breast.  Breast cancer risk factors include family hx on father's side, delayed child bearing, menarche before age 16, family hx of (SECON[place]) ovarian CA and gender. Age of menarche was 7. Last menstrual period was Mid month April. Patient has hormonal therapy, recent Depo, has been on OCPs or similar since 19y until 36y. Patient is . Patient denies nipple discharge     Bilateral Diagnostic Mammogram 2018: A spiculated mass is seen in the upper outer quadrant of the right breast in approximately the 11 o'clock position underlying the marked area of palpable concern. The spiculation surrounding the mass overall measures 3 cm diameter by mammography. Right Breast Ultrasound on 2018:   Taller than wide macro-lobulated and micro-lobulated mass with posterior shadowing and increased blood flow in the 11 o'clock position of the right breast, 10 cm from the nipple measuring 10 x 9 x 9 mm with surrounding spiculation and retraction of connective tissue. On 05/09/2018:  Right breast, 12:00 core needle biopsy: Invasive, well-differentiated ductal carcinoma (grade 1)  Comment: Focal low-grade DCIS is also noted. Calponin and p63 immunostains show a lack of myoepithelial layer which supports the interpretation. The tumor has a Melany score of 2 (tubule formation) +2 (nuclear pleomorphism) +1 (mitotic count) = 5. ER positive >90%  CT positive   80%  HER/2 Negative (1+)     CXR PA/Lateral on 05/17/2018:  No focal consolidation     Right axillary U/S on 05/17/2018: There is no axillary lymphadenopathy. MRI Bilateral Breast 05/30/2018:  A 1.3 x 0.8 x 1.1 cm spiculated, enhancing mass is identified in the right breast 12:00 with associated artifact from a metallic biopsy clip. No suspicious mass or non-mass enhancement seen on the left. There is no lymphadenopathy. Bilateral skin and nipple areolar complexes are normal. Visualized portions of the chest and abdomen are unremarkable. Right breast blue dye injection, lumpectomy, sentinel lymph node excision was performed on 07/03/2018:  A. Breast, left, reduction mammoplasty: Benign breast tissue with fibrocystic change  Unremarkable skin    B. [place] lymph node #1, excision: 2 lymph nodes, negative for metastatic carcinoma    C. [place] lymph node #2, excision: 2 lymph nodes, negative for metastatic carcinoma    D. Breast, right, oncoplastic reduction: Benign breast tissue with  fibrocystic change  Unremarkable skin    E.  Breast, right, lumpectomy: Invasive well-differentiated ductal   carcinoma (grade 1)  Ductal carcinoma in situ, intermediate nuclear grade  Lateral and posterior margins positive for invasive carcinoma  Ductal carcinoma in situ present less than 1 mm from lateral margin  Previous biopsy site identified  Fibrocystic change    F.   Right breast, additional posterior margin, excision: Fibrocystic  change  Negative for invasive carcinoma  Negative for ductal carcinoma in situ    G. Right breast, additional medial margin, excision: Benign breast  tissue  Negative for invasive carcinoma  Negative for ductal carcinoma in situ    H. Right breast, additional inferior margin, excision: Fibrocystic  change  Negative for invasive carcinoma  Negative for ductal carcinoma in situ    CANCER CASE SUMMARY  Procedure: Excision  Specimen laterality: Right  Tumor size: 1.2 x 1 x 0.7 cm (greatest dimension measured  microscopically)  Histologic type: Invasive carcinoma of no special type (ductal, not  otherwise specified)  Histologic grade (Melany histologic score)       Tubular differentiation: Score 1       Nuclear pleomorphism: Score 2       Mitotic rate: Score 1       Overall grade: Grade 1 (score 4)  Ductal carcinoma in situ: Present, negative for extensive intraductal  component       Nuclear grade: Grade II (intermediate)  Lobular carcinoma in situ: Not identified  Margins: Invasive carcinoma present at lateral/orange inked margin   Final posterior margin negative for carcinoma   Ductal carcinoma in situ present less than 1 mm from lateral/orange  inked margin  Treatment effect: No known presurgical therapy  Pathologic stage classification (pTNM, AJCC 8th Edition): pT1c (sn)pN0 [0  out of 4 lymph nodes]     ER positive >90%  AR positive   80%  HER/2 Negative (1+)     Oncotype DX Breast Cancer Report-Node Negative   Recurrence Score Result-15   Risk category-low risk     On 07/17/2018: RIGHT breast, re-excision: Organizational changes of prior biopsy, no evidence of residual neoplasm. Margins of excision are negative for neoplasm.     -Per Medical Oncology, Dr. Mederos Basket:  No indication and No Benefit for adjuvant chemotherapy. -RT was completed on 10/01/2018.  -ET:  Tamoxifen 20 mg po daily was started on 10/02/2018. Poor tolerance.   DC tamoxifen  01/07/2019 Arimidex 1 mg by mouth once daily was started. Poor tolerance. JOSE MIGUEL Arimidex  02/04/2019 letrozole 2.5 mg by mouth once daily was started. Poor tolerance. JOSE MIGUEL Arimidex. 03/04/2019 tamoxifen 20 mg by mouth once daily started/resumed per patient request.  Poor tolerance. JOSE MIGUEL tamoxifen  Opted for active surveillance. Review of Systems   Constitutional:  Positive for appetite change (Due to the loss of her loved one). Negative for activity change, chills, fatigue, fever and unexpected weight change. 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 Enjoys camping and riding [employer] but has been busy caring for her grandparents; her grandmother recently passed away from ocular melanoma and services are upcoming. Pain in the right anterior chest wall is intermittent. Respiratory:  Negative for cough and shortness of breath. Cardiovascular:  Negative for chest pain and palpitations. Musculoskeletal:  Negative for arthralgias, back pain and gait problem. Scattered bone/joint pain; chronic and may be r/t underlying chronic pain disorder. Re-staging scans in June 2022 we negative for metastatic disease. Psychiatric/Behavioral:  The patient is not nervous/anxious. She is struggling on this visit due to recent passing of her grandmother from choroid melanoma. In addition, she reports her boyfriend is out of town serving in the armed forces which has been stressful. Objective:   Physical Exam  Vitals and nursing note reviewed. Constitutional:       General: She is not in acute distress. Appearance: She is well-developed. She is not diaphoretic. Comments: ECOG is stable at 0. No acute distress. HENT:      Head: Normocephalic and atraumatic. Mouth/Throat:      Pharynx: No oropharyngeal exudate. Eyes:      General: No scleral icterus. Right eye: No discharge. Left eye: No discharge.       Conjunctiva/sclera: Conjunctivae normal.   Neck:      Thyroid: No thyromegaly. Vascular: No JVD. Trachea: No tracheal deviation. Comments: Multiple bilateral reactive adenopathy of the anterior cervical nodes. On the right side she has a palpable posterior cervical node. Cardiovascular:      Rate and Rhythm: Normal rate and regular rhythm. Heart sounds: Normal heart sounds. No murmur heard. No friction rub. No gallop. Pulmonary:      Effort: Pulmonary effort is normal. No respiratory distress or retractions. Breath sounds: Normal breath sounds. No stridor. No wheezing or rales. Chest:      Chest wall: No mass, lacerations, deformity, swelling, tenderness or edema. Breasts:     Breasts are symmetrical.      Right: No inverted nipple, mass, nipple discharge, skin change or tenderness. Left: No inverted nipple, mass, nipple discharge, skin change or tenderness. Comments: Breast tissue is extremely dense bilaterally, right greater than left secondary to posttreatment changes. She reports the area of discomfort in the right anterior chest is intermittent. Abdominal:      General: There is no distension. Palpations: Abdomen is soft. Tenderness: There is no abdominal tenderness. There is no guarding or rebound. Musculoskeletal:         General: No tenderness or deformity. Right shoulder: Decreased range of motion (Has a history of shoulder injury in the past.  Has limited ROM of RUE.). Left shoulder: Normal.      Cervical back: Normal range of motion and neck supple. Lymphadenopathy:      Cervical: No cervical adenopathy. Right cervical: No superficial, deep or posterior cervical adenopathy. Left cervical: No superficial, deep or posterior cervical adenopathy. Upper Body:      Right upper body: No pectoral adenopathy. Left upper body: No pectoral adenopathy. Skin:     General: Skin is warm and dry. Coloration: Skin is not pale. Findings: No erythema or rash.    Neurological: Mental Status: She is alert and oriented to person, place, and time. Coordination: Coordination normal.   Psychiatric:         Behavior: Behavior normal.         Thought Content: Thought content normal.         Judgment: Judgment normal.      Comments: Teary due to recent loss of her grandmother. Assessment:    Renee Bentley is a 40 y.o. extremely pleasant female with right breast cancer, well-differentiated, ER/KS positive HER-2/marc negative disease. Bilateral Diagnostic Mammogram 05/03/2018: A spiculated mass is seen in the upper outer quadrant of the right breast in approximately the 11 o'clock position underlying the marked area of palpable concern. The spiculation surrounding the mass overall measures 3 cm diameter by mammography. Right axillary U/S on 05/17/2018: There is no axillary lymphadenopathy. 05/09/2018 Right breast, 12:00 core needle biopsy: Invasive, well-differentiated ductal carcinoma (grade 1)  Comment: Focal low-grade DCIS is also noted. Calponin and p63 immunostains show a lack of myoepithelial layer which supports the interpretation. The tumor has a Melany score of 2 (tubule formation) +2 (nuclear pleomorphism) +1 (mitotic count) = 5. ER positive >90%  KS positive   80%  HER/2 Negative (1+)     05/30/2018 MRI Bilateral Breast 05/30/2018:  A 1.3 x 0.8 x 1.1 cm spiculated, enhancing mass is identified in the right breast 12:00 with associated artifact from a metallic biopsy clip. No suspicious mass or non-mass enhancement seen on the left. There is no lymphadenopathy. Bilateral skin and nipple areolar complexes are normal. Visualized portions of the chest and abdomen are unremarkable. Right breast blue dye injection, lumpectomy, sentinel lymph node excision was performed on 07/03/2018:  A. Breast, left, reduction mammoplasty: Benign breast tissue with fibrocystic change  Unremarkable skin    B.   [place] lymph node #1, excision: 2 lymph nodes, negative for metastatic carcinoma  C. [place] lymph node #2, excision: 2 lymph nodes, negative for metastatic carcinoma  D. Breast, right, oncoplastic reduction: Benign breast tissue with fibrocystic change Unremarkable skin  E.  Breast, right, lumpectomy: Invasive well-differentiated ductal carcinoma (grade 1)  Ductal carcinoma in situ, intermediate nuclear grade Lateral and posterior margins positive for invasive carcinoma  Ductal carcinoma in situ present less than 1 mm from lateral margin Previous biopsy site identified; Fibrocystic change  F. Right breast, additional posterior margin, excision: Fibrocystic change Negative for invasive carcinoma  Negative for ductal carcinoma in situ  G. Right breast, additional medial margin, excision: Benign breast tissue. Negative for invasive carcinoma. Negative for ductal carcinoma in situ  H. Right breast, additional inferior margin, excision: Fibrocystic change  Negative for invasive carcinoma  Negative for ductal carcinoma in situ    CANCER CASE SUMMARY: Tumor size 1.2 x 1 x 0.7 cm. Overall grade 1. DCIS present, negative for extensive intraductal component. Nuclear grade: Grade II (intermediate). Lobular carcinoma in situ: Not identified. Margins: Invasive carcinoma present at lateral/orange inked margin   Final posterior margin negative for carcinoma. Ductal carcinoma in situ present less than 1 mm from lateral/orange inked margin. Treatment effect: No known presurgical therapy  Pathologic stage classification (pTNM, AJCC 8th Edition): pT1c (sn)pN0 [0 out of 4 lymph nodes]     ER positive >90%  ND positive   80%  HER/2 Negative (1+)     Oncotype DX Breast Cancer Report-Node Negative  Recurrence Score Result-15 (low risk). 07/17/2018: RIGHT breast, re-excision: Organizational changes of prior biopsy, no evidence of residual neoplasm. Margins of excision are negative for neoplasm.   10/01/2018 completed adjuvant RT.  -ET:  Tamoxifen 20 mg po daily was started on 10/02/2018. Poor tolerance. DC tamoxifen  01/07/2019 Arimidex 1 mg by mouth once daily was started. Poor tolerance. DC Arimidex  02/04/2019 letrozole 2.5 mg by mouth once daily was started. Poor tolerance. DC Arimidex. 03/04/2019 tamoxifen 20 mg by mouth once daily started/resumed per patient request.  Poor tolerance. DC tamoxifen  Opted for active surveillance. 11/15/2018 Robotic TLH/BSO by Dr. Yocasta Selby at [facility]; Path negative for malignancy. 04/08/2019 bilateral screening mammogram Benign. 10/24/2019 MRI bilateral breasts: Linear non-mass enhancement left upper outer breast.  BI-RADS 3, probably benign  10/30/2019 follow up. She felt the left breast mass has enlarged in size. Clinically, she has a nodular density at the 1:00 position (2 cm FTN) and the 3:00 position (1 cm FTN) of the left breast.    10/30/2019 Left breast US noted an oval solid mass measuring 6 mm in the left breast with significant vascularity; biopsy recommended. 10/30/2019 left breast, 3 o'clock position core biopsy:  Fat necrosis with foreign body reaction and calcification; Negative for epithelial hyperplasia or neoplasia. 12/16/2019 MRI LS Negative for metastatic disease. 06/09/2020: Bilateral Mammogram [facility] was negative. 07/07/2020 MRI bilateral breast: No convincing evidence of neoplasm. BI-RADS 3.  06/17/2021 bilateral screening mammogram: Negative, BI-RADS 1. In view of history of breast cancer, poor tolerance to endocrine therapy, and dense breast tissue, recommend MRI screening in 6 months. 08/05/2021 Right Diagnostic mammogram and right breast ultrasound for complaints of new lump: Negative, BI-RADS 1.    08/13/2021 nuclear medicine bone scan: No convincing evidence of metastatic disease. 06/20/2022 bilateral screening mammogram: Negative, BI-RADS 1.    06/30/2022 ultrasound neck: Mild thyroid heterogeneity. No discrete nodules.   Palpable lump left neck corresponds to normal-appearing lymph node.  07/01/2022 restaging scans, CT chest, abdomen/pelvis, and bone scan: No evidence of metastatic disease. 12/20/2022 clinical follow up: Is without convincing evidence of recurrent disease. Her breast exam is made difficult secondary to extremely dense breast tissue bilaterally as well as posttreatment related changes on the right. Her last MRI was approximately 2-1/2 years ago; would recommend repeat MRI of the bilateral breast in view of her extremely dense breast tissue, personal history of breast cancer, right breast/chest wall tenderness, and challenging clinical exam.  She has had a hysterectomy so has no cycles. Further recommendations will be made once MRI is complete. Plan:     Continue monthly breast/chest wall self examination; detailed instructions reviewed today. Bring any changes to your physician's attention. Continue healthy diet and exercise routinely as tolerated. Maintaining ideal body weight (20-25 BMI) may lead to optimal breast cancer outcomes. Avoid alcohol or limit to 5 drinks or less per week. .    Continue follow up with Medical Oncology (she will call the office for an apt), Primary Care, and all specialties as directed. MRI of the bilateral breast to be done at this time. I spent a total of 23 minutes on the date of the service which included preparing to see the patient, face-to-face patient care, completing clinical documentation, obtaining and/or reviewing separately obtained history, performing a medically appropriate examination, counseling and educating the patient/family/caregiver, ordering medications, tests, or procedures, communicating with other HCPs (not separately reported), independently interpreting results (not separately reported), communicating results to the patient/family/caregiver and care coordination (not separately reported).      This document is generated, in part, by voice recognition software and thus syntax and grammatical errors are porsche. Manuel Zhu, RN, MSN, APRN-CNP, [address] [place]  Advanced Oncology Certified Nurse Practitioner  Department of Breast Surgery  [facility] [place]/  Care in collaboration with Dr. Samia Duncan/Dr. Elizabeth Umanzor/Dr. Lucille Ibarra APRN-CNP

## 2023-01-17 ENCOUNTER — OFFICE VISIT (OUTPATIENT)
Dept: BREAST CENTER | Age: 43
End: 2023-01-17
Payer: COMMERCIAL

## 2023-01-17 VITALS
OXYGEN SATURATION: 98 % | TEMPERATURE: 98.2 F | WEIGHT: 132 LBS | RESPIRATION RATE: 12 BRPM | HEART RATE: 83 BPM | SYSTOLIC BLOOD PRESSURE: 112 MMHG | HEIGHT: 62 IN | DIASTOLIC BLOOD PRESSURE: 74 MMHG | BODY MASS INDEX: 24.29 KG/M2

## 2023-01-17 DIAGNOSIS — Z85.3 PERSONAL HISTORY OF BREAST CANCER: Primary | ICD-10-CM

## 2023-01-17 DIAGNOSIS — N64.4 BREAST PAIN, RIGHT: ICD-10-CM

## 2023-01-17 DIAGNOSIS — R92.2 DENSE BREAST TISSUE ON MAMMOGRAM: ICD-10-CM

## 2023-01-17 DIAGNOSIS — R92.2 DENSE BREAST: ICD-10-CM

## 2023-01-17 DIAGNOSIS — N64.4 BREAST PAIN: ICD-10-CM

## 2023-01-17 PROBLEM — R92.30 DENSE BREAST TISSUE ON MAMMOGRAM: Status: ACTIVE | Noted: 2023-01-17

## 2023-01-17 PROCEDURE — 99213 OFFICE O/P EST LOW 20 MIN: CPT | Performed by: NURSE PRACTITIONER

## 2023-01-17 RX ORDER — CYCLOBENZAPRINE HCL 10 MG
TABLET ORAL
COMMUNITY
Start: 2023-01-12

## 2023-01-19 ENCOUNTER — TELEPHONE (OUTPATIENT)
Dept: BREAST CENTER | Age: 43
End: 2023-01-19

## 2023-01-19 NOTE — TELEPHONE ENCOUNTER
Authorization has been obtained for breast MRI 42701 - Approval # [id number] valid until 2/17/23 per [facility]

## 2023-01-20 DIAGNOSIS — Z17.0 MALIGNANT NEOPLASM OF BREAST, STAGE 1, ESTROGEN RECEPTOR POSITIVE, UNSPECIFIED LATERALITY (HCC): Primary | ICD-10-CM

## 2023-01-20 DIAGNOSIS — C50.919 MALIGNANT NEOPLASM OF BREAST, STAGE 1, ESTROGEN RECEPTOR POSITIVE, UNSPECIFIED LATERALITY (HCC): Primary | ICD-10-CM

## 2023-01-23 ENCOUNTER — HOSPITAL ENCOUNTER (OUTPATIENT)
Dept: INFUSION THERAPY | Age: 43
Discharge: HOME | End: 2023-01-23
Payer: COMMERCIAL

## 2023-01-23 ENCOUNTER — OFFICE VISIT (OUTPATIENT)
Dept: ONCOLOGY | Age: 43
End: 2023-01-23
Payer: COMMERCIAL

## 2023-01-23 VITALS
DIASTOLIC BLOOD PRESSURE: 68 MMHG | HEIGHT: 62 IN | TEMPERATURE: 99 F | BODY MASS INDEX: 24.86 KG/M2 | SYSTOLIC BLOOD PRESSURE: 123 MMHG | OXYGEN SATURATION: 100 % | HEART RATE: 89 BPM | WEIGHT: 135.1 LBS

## 2023-01-23 DIAGNOSIS — C50.919 MALIGNANT NEOPLASM OF BREAST, STAGE 1, ESTROGEN RECEPTOR POSITIVE, UNSPECIFIED LATERALITY (HCC): ICD-10-CM

## 2023-01-23 DIAGNOSIS — Z17.0 MALIGNANT NEOPLASM OF BREAST, STAGE 1, ESTROGEN RECEPTOR POSITIVE, UNSPECIFIED LATERALITY (HCC): ICD-10-CM

## 2023-01-23 DIAGNOSIS — Z85.3 PERSONAL HISTORY OF BREAST CANCER: Primary | ICD-10-CM

## 2023-01-23 LAB
ALBUMIN SERPL-MCNC: 4.2 G/DL (ref 3.5–5.2)
ALP BLD-CCNC: 159 U/L (ref 35–104)
ALT SERPL-CCNC: 7 U/L (ref 0–32)
ANION GAP SERPL CALCULATED.3IONS-SCNC: 13 MMOL/L (ref 7–16)
AST SERPL-CCNC: 17 U/L (ref 0–31)
BASOPHILS ABSOLUTE: 0.05 E9/L (ref 0–0.2)
BASOPHILS RELATIVE PERCENT: 0.5 % (ref 0–2)
BILIRUB SERPL-MCNC: 0.5 MG/DL (ref 0–1.2)
BUN BLDV-MCNC: 5 MG/DL (ref 6–20)
CALCIUM SERPL-MCNC: 9.4 MG/DL (ref 8.6–10.2)
CHLORIDE BLD-SCNC: 107 MMOL/L (ref 98–107)
CO2: 24 MMOL/L (ref 22–29)
CREAT SERPL-MCNC: 0.7 MG/DL (ref 0.5–1)
EOSINOPHILS ABSOLUTE: 0.06 E9/L (ref 0.05–0.5)
EOSINOPHILS RELATIVE PERCENT: 0.6 % (ref 0–6)
GFR SERPL CREATININE-BSD FRML MDRD: >60 ML/MIN/1.73
GLUCOSE BLD-MCNC: 88 MG/DL (ref 74–99)
HCT VFR BLD CALC: 39.8 % (ref 34–48)
HEMOGLOBIN: 13.4 G/DL (ref 11.5–15.5)
IMMATURE GRANULOCYTES #: 0.03 E9/L
IMMATURE GRANULOCYTES %: 0.3 % (ref 0–5)
LYMPHOCYTES ABSOLUTE: 1.82 E9/L (ref 1.5–4)
LYMPHOCYTES RELATIVE PERCENT: 19.5 % (ref 20–42)
MCH RBC QN AUTO: 30.2 PG (ref 26–35)
MCHC RBC AUTO-ENTMCNC: 33.7 % (ref 32–34.5)
MCV RBC AUTO: 89.8 FL (ref 80–99.9)
MONOCYTES ABSOLUTE: 0.39 E9/L (ref 0.1–0.95)
MONOCYTES RELATIVE PERCENT: 4.2 % (ref 2–12)
NEUTROPHILS ABSOLUTE: 7 E9/L (ref 1.8–7.3)
NEUTROPHILS RELATIVE PERCENT: 74.9 % (ref 43–80)
PDW BLD-RTO: 13.2 FL (ref 11.5–15)
PLATELET # BLD: 390 E9/L (ref 130–450)
PMV BLD AUTO: 9.9 FL (ref 7–12)
POTASSIUM SERPL-SCNC: 3.5 MMOL/L (ref 3.5–5)
RBC # BLD: 4.43 E12/L (ref 3.5–5.5)
SODIUM BLD-SCNC: 144 MMOL/L (ref 132–146)
TOTAL PROTEIN: 7.1 G/DL (ref 6.4–8.3)
WBC # BLD: 9.4 E9/L (ref 4.5–11.5)

## 2023-01-23 PROCEDURE — 99213 OFFICE O/P EST LOW 20 MIN: CPT | Performed by: INTERNAL MEDICINE

## 2023-01-23 PROCEDURE — 80053 COMPREHEN METABOLIC PANEL: CPT

## 2023-01-23 PROCEDURE — 99212 OFFICE O/P EST SF 10 MIN: CPT

## 2023-01-23 PROCEDURE — 85025 COMPLETE CBC W/AUTO DIFF WBC: CPT

## 2023-01-23 PROCEDURE — 36415 COLL VENOUS BLD VENIPUNCTURE: CPT

## 2023-01-23 NOTE — PROGRESS NOTES
Department of [facility] Oncology  Attending Clinic Note    Reason for Visit:  Follow-up on a patient with Right Breast Cancer    PCP:  Lisa Ahumada    History of Present Illness: The mass was located in the 12 o'clock position of right breast    Breast cancer risk factors include family hx on father's side, delayed child bearing, menarche before age 16, family hx of ovarian CA and gender. Age of menarche was 7. Last menstrual period was Mid month April. Patient has hormonal therapy, recent Depo, has been on OCPs or similar since 19y until 36y. Patient is . Patient denies nipple discharge    Bilateral Diagnostic Mammogram 2018: A spiculated mass is seen in the upper outer quadrant of the right breast in approximately the 11 o'clock position underlying the marked area of palpable concern. The spiculation surrounding the mass overall measures 3 cm diameter by mammography. Right Breast Ultrasound on 2018: Taller than wide macro-lobulated and micro-lobulated mass with posterior shadowing and increased blood flow in the 11 o'clock position of the right breast, 10 cm from the nipple measuring 10 x 9 x 9 mm with surrounding spiculation and retraction of connective tissue. On 2018:  Right breast, 12:00 core needle biopsy: Invasive, well-differentiated ductal carcinoma (grade 1)  Comment: Focal low-grade DCIS is also noted. Calponin and p63 immunostains show a lack of myoepithelial layer which supports the interpretation. The tumor has a Melany score of 2 (tubule formation) +2 (nuclear pleomorphism) +1 (mitotic count) = 5. ER positive >90%  OH positive   80%  HER/2 Negative (1+)    CXR PA/Lateral on 2018:  No focal consolidation    Right axillary U/S on 2018: There is no axillary lymphadenopathy.     MRI Bilateral Breast 2018:  A 1.3 x 0.8 x 1.1 cm spiculated, enhancing mass is identified in the right breast 12:00 with associated artifact from a metallic biopsy clip. No suspicious mass or non-mass enhancement seen on the left. There is no lymphadenopathy. Bilateral skin and nipple areolar complexes are normal. Visualized portions of the chest and abdomen are unremarkable. Right breast blue dye injection, lumpectomy, sentinel lymph node excision was performed on 07/03/2018:  A. Breast, left, reduction mammoplasty: Benign breast tissue with fibrocystic change  Unremarkable skin    B. [place] lymph node #1, excision: 2 lymph nodes, negative for metastatic carcinoma    C. [place] lymph node #2, excision: 2 lymph nodes, negative for metastatic carcinoma    D. Breast, right, oncoplastic reduction: Benign breast tissue with  fibrocystic change  Unremarkable skin    E.  Breast, right, lumpectomy: Invasive well-differentiated ductal   carcinoma (grade 1)  Ductal carcinoma in situ, intermediate nuclear grade  Lateral and posterior margins positive for invasive carcinoma  Ductal carcinoma in situ present less than 1 mm from lateral margin  Previous biopsy site identified  Fibrocystic change    F. Right breast, additional posterior margin, excision: Fibrocystic  change  Negative for invasive carcinoma  Negative for ductal carcinoma in situ    G. Right breast, additional medial margin, excision: Benign breast  tissue  Negative for invasive carcinoma  Negative for ductal carcinoma in situ    H. Right breast, additional inferior margin, excision: Fibrocystic  change  Negative for invasive carcinoma  Negative for ductal carcinoma in situ    CANCER CASE SUMMARY  Procedure: Excision  Specimen laterality: Right  Tumor size: 1.2 x 1 x 0.7 cm (greatest dimension measured  microscopically)  Histologic type:  Invasive carcinoma of no special type (ductal, not  otherwise specified)  Histologic grade (Bethesda histologic score)       Tubular differentiation: Score 1       Nuclear pleomorphism: Score 2       Mitotic rate: Score 1       Overall grade: Grade 1 (score 4)  Ductal carcinoma in situ: Present, negative for extensive intraductal  component       Nuclear grade: Grade II (intermediate)  Lobular carcinoma in situ: Not identified  Margins: Invasive carcinoma present at lateral/orange inked margin   Final posterior margin negative for carcinoma   Ductal carcinoma in situ present less than 1 mm from lateral/orange  inked margin  Treatment effect: No known presurgical therapy  Pathologic stage classification (pTNM, AJCC 8th Edition): pT1c (sn)pN0 [0  out of 4 lymph nodes]    ER positive >90%  IN positive   80%  HER/2 Negative (1+)    Oncotype DX Breast Cancer Report-Node Negative   Recurrence Score Result-15   Risk category-low risk    On 07/17/2018: Left breast, re-excision: Organizational changes of prior biopsy, no  evidence of residual neoplasm. Margins of excision are negative for neoplasm. Given low score Oncotype Dx (her score is even less than 16 per [employer] data published in ASCO 2018), No indication and No Benefit for adjuvant chemotherapy. Proceed with adjuvant RT followed by hormonal therapy    RT was completed on 10/01/2018. Tamoxifen 20 mg po daily was started on 10/02/2018 with fair tolerance. Robotic TLH/BSO 11/15/2018 by Dr. Mariajose Ramirez at [facility]; Path negative for malignancy. D/C Tamoxifen. We recommended Arimidex 1 mg po daily. Side effects of Arimidex reviewed with patient. She agreed to proceed. Ca/VitD while on Arimidex. Arimidex 1 mg po daily was started on 01/07/2019 where she complained of significant myalgias. D/C Arimidex. Recommended Femara instead. Femara 2.5 mg po daily was started on 02/04/2019 which she tolerated it poorly as well. Significant arthralgias affecting quality of life. D/C Femara. Patient wanted to go back on Tamoxifen. Tamoxifen 20 mg po daily was re-started on 03/04/2019 with poor tolerance (significant arthralgias affecting quality of life). D/C Tamoxifen. We recommended Exemestane 25 mg po daily.  Side effects of Exemestane reviewed with patient. She agreed to proceed. Exemestane 25 mg po daily was started on 04/08/2019 she also tolerated poorly (significant arthralgias affecting quality of life). Patient d/c Exemestane and wanted to be observation only. Today 01/23/2023: No fever, chills. Fair appetite and energy level. Intermittent arthralgias. No chest pain or SOB. No N.V.    Review of Systems;  CONSTITUTIONAL: No fever, chills. Fair appetite and energy level. ENMT: Eyes: No diplopia; Nose: No epistaxis. Mouth: No sore throat. RESPIRATORY: No hemoptysis, shortness of breath, cough. CARDIOVASCULAR: No chest pain, palpitations. GASTROINTESTINAL: No nausea/vomiting, diarrhea/constipation. GENITOURINARY: No dysuria, urinary frequency, hematuria. MSK: Intermittent arthralgias  NEURO: No syncope, presyncope, headache. Remainder: ROS NEGATIVE    Past Medical History:      Diagnosis Date    Arthritis     Breast cancer ([facility]     Cancer ([facility] 2018    breast cancer--right breast, ER/WA+ Her2-    History of therapeutic radiation     Necrotizing granulomatous inflammation of lung (HCC)     states necrotizing granulomatous of the abdomen, not the lung     Medications:  Reviewed and reconciled. Allergies: Allergies   Allergen Reactions    Ultram [Tramadol Hcl]      confused     Physical Exam:  /68   Pulse 89   Temp 99 °F (37.2 °C)   Ht 5' 2\" (1.575 m)   Wt 135 lb 1.6 oz (61.3 kg)   LMP 05/16/2018 (LMP Unknown)   SpO2 100%   BMI 24.71 kg/m²   GENERAL: Alert, oriented x 3, not in acute distress. LUNGS: CTA Vipul  CVS: RRR  EXTREMITIES: Without clubbing, cyanosis, or edema.    ECOG PS 1    Lab Results   Component Value Date    WBC 9.4 01/23/2023    HGB 13.4 01/23/2023    HCT 39.8 01/23/2023    MCV 89.8 01/23/2023     01/23/2023     Lab Results   Component Value Date     01/23/2023    K 3.5 01/23/2023     01/23/2023    CO2 24 01/23/2023    BUN 5 (L) 01/23/2023    CREATININE 0.7 01/23/2023 GLUCOSE 88 01/23/2023    CALCIUM 9.4 01/23/2023    PROT 7.1 01/23/2023    LABALBU 4.2 01/23/2023    BILITOT 0.5 01/23/2023    ALKPHOS 159 (H) 01/23/2023    AST 17 01/23/2023    ALT 7 01/23/2023    LABGLOM >60 01/23/2023    GFRAA >60 06/20/2022     Impression/Plan:  40 y.o. female who underwent Right breast blue dye injection, lumpectomy, sentinel lymph node excision on 07/03/2018:  A. Breast, left, reduction mammoplasty: Benign breast tissue with fibrocystic change  Unremarkable skin    B. [place] lymph node #1, excision: 2 lymph nodes, negative for metastatic carcinoma    C. [place] lymph node #2, excision: 2 lymph nodes, negative for metastatic carcinoma    D. Breast, right, oncoplastic reduction: Benign breast tissue with fibrocystic change  Unremarkable skin    E.  Breast, right, lumpectomy: Invasive well-differentiated ductal carcinoma (grade 1)  Ductal carcinoma in situ, intermediate nuclear grade  Lateral and posterior margins positive for invasive carcinoma  Ductal carcinoma in situ present less than 1 mm from lateral margin  Previous biopsy site identified  Fibrocystic change    F. Right breast, additional posterior margin, excision: Fibrocystic change  Negative for invasive carcinoma  Negative for ductal carcinoma in situ    G. Right breast, additional medial margin, excision: Benign breast tissue  Negative for invasive carcinoma  Negative for ductal carcinoma in situ    H. Right breast, additional inferior margin, excision: Fibrocystic change  Negative for invasive carcinoma  Negative for ductal carcinoma in situ    CANCER CASE SUMMARY  Procedure: Excision  Specimen laterality: Right  Tumor size: 1.2 x 1 x 0.7 cm (greatest dimension measured microscopically)  Histologic type:  Invasive carcinoma of no special type (ductal, not otherwise specified)  Histologic grade (Melany histologic score)       Tubular differentiation: Score 1       Nuclear pleomorphism: Score 2       Mitotic rate: Score 1 Overall grade: Grade 1 (score 4)  Ductal carcinoma in situ: Present, negative for extensive intraductal component       Nuclear grade: Grade II (intermediate)  Lobular carcinoma in situ: Not identified  Margins: Invasive carcinoma present at lateral/orange inked margin   Final posterior margin negative for carcinoma   Ductal carcinoma in situ present less than 1 mm from lateral/orange inked margin  Treatment effect: No known presurgical therapy  Pathologic stage classification (pTNM, AJCC 8th Edition):   pT1c (sn)pN0 [0 out of 4 lymph nodes]    ER positive >90%  AK positive   80%  HER/2 Negative (1+)    Oncotype DX Breast Cancer Report-Node Negative   Recurrence Score Result-15   Risk category-low risk    On 07/17/2018: Left breast, re-excision: Organizational changes of prior biopsy, no  evidence of residual neoplasm. Margins of excision are negative for neoplasm. Given low score Oncotype Dx (her score is even less than 16 per [employer] data published in ASCO 2018), No indication and No Benefit for adjuvant chemotherapy. Proceed with adjuvant RT followed by hormonal therapy. RT was completed on 10/01/2018. Tamoxifen 20 mg po daily was started on 10/02/2018 with fair tolerance. Robotic TLH/BSO 11/15/2018 by Dr. Toni Olmstead at [facility]; Path negative for malignancy. D/C Tamoxifen. We recommended Arimidex 1 mg po daily. Side effects of Arimidex reviewed with patient. She agreed to proceed. Ca/VitD while on Arimidex. DEXA 02/04/2019 normal in LS and hips. Arimidex 1 mg po daily was started on 01/07/2019 where she complained of significant myalgias affecting quality of life. D/C Arimidex. Recommended Femara instead. Femara 2.5 mg po daily was started on 02/04/2019 which she tolerated it poorly as well. Significant arthralgias affecting quality of life. D/C Femara. Patient wanted to go back on Tamoxifen.    Tamoxifen 20 mg po daily was re-started on 03/04/2019 with poor tolerance (significant arthralgias affecting quality of life). D/C Tamoxifen. We recommended Exemestane 25 mg po daily. Side effects of Exemestane reviewed with patient. She agreed to proceed. Exemestane 25 mg po daily was started on 04/08/2019 which she also tolerated poorly (significant arthralgias affecting quality of life). Patient d/c Exemestane and wanted to be observation only. Bilateral Diagnostic Mammogram/Left Breast 04/08/2019 negative for malignancy. Bilateral Breast MRI 10/24/2019: Linear non mass enhancement in the upper outer left breast is probably benign. It is likely due to postsurgical changes/fat necrosis. No evidence of neoplasm on the right. Left Breast US 10/30/2019: There is an oval solid mass measuring 6 mm seen in the left breast. Internal echogenicity is isoechoic. The finding shows no significant increase in vascularity. Left Breast Ultrasound Core Needle Biopsy 10/30/2019:   Left breast, 3 o'clock position, core biopsy: Fat necrosis with foreign body reaction and focal calcification. Negative for epithelial hyperplasia or neoplasia. Bilateral Screening Mammogram 06/09/2020 No mammographic evidence of malignancy. MRI Bilateral Breast 07/07/2020 no convincing evidence of neoplasm bilaterally. Bilateral Screening Mammogram 06/17/2021: No evidence of malignancy  Right Diagnostic Mammogram/US 08/05/2021: No mammographic or sonographic evidence of malignancy in the right breast    Bone scan 08/13/2021: New asymmetric activity at the left ankle, likely due to healing trauma or asymmetric arthropathy. Multifocal degenerative changes     Bilateral Screening Mammogram 06/20/2022: No evidence of malignancy. US Head Neck Soft tissue 06/30/2022: Mild thyroid heterogeneity. Normal vascularity. No discrete nodules. Palpable lump in the left neck soft tissues corresponds to a morphologically normal appearing 17 mm lymph node.   Cortex does not appear thickened on this exam    CT chest 2022: No evidence of metastatic disease  CT abdomen pelvis 2022: No evidence of metastatic disease. Nonspecific densities in the pelvis and right proximal femur including pubic symphysis region consistent with arthritic changes. Bone scan 2022: Findings compatible with degenerative changes. Imaging reviewed. Labs reviewed.  MRI Breast in 2023  NILDA    RTC 6 months with labs     Olya Law MD   2023

## 2023-02-08 ENCOUNTER — HOSPITAL ENCOUNTER (OUTPATIENT)
Dept: MRI IMAGING | Age: 43
Discharge: HOME | End: 2023-02-10
Payer: COMMERCIAL

## 2023-02-08 DIAGNOSIS — N64.4 BREAST PAIN: ICD-10-CM

## 2023-02-08 DIAGNOSIS — R92.2 DENSE BREAST TISSUE ON MAMMOGRAM: ICD-10-CM

## 2023-02-08 DIAGNOSIS — R92.2 DENSE BREAST: ICD-10-CM

## 2023-02-08 DIAGNOSIS — Z85.3 PERSONAL HISTORY OF BREAST CANCER: ICD-10-CM

## 2023-02-08 PROCEDURE — 6360000004 HC RX CONTRAST MEDICATION: Performed by: RADIOLOGY

## 2023-02-08 PROCEDURE — A9585 GADOBUTROL INJECTION: HCPCS | Performed by: RADIOLOGY

## 2023-02-08 PROCEDURE — C8908 MRI W/O FOL W/CONT, BREAST,: HCPCS

## 2023-02-08 RX ADMIN — GADOBUTROL 6 ML: 604.72 INJECTION INTRAVENOUS at 09:03

## 2023-02-15 ENCOUNTER — TELEPHONE (OUTPATIENT)
Dept: BREAST CENTER | Age: 43
End: 2023-02-15

## 2023-02-15 NOTE — TELEPHONE ENCOUNTER
RN reviewed recent breast MRI with NII Ch. Patient MRI negative. Per NII [facility] patient to have follow up in August with mammogram prior. RN contacted patient and relayed information. Patient was scheduled for 8/9/23 8am mammogram and 9am OV with NP. Patient verbalized appointment information.          Electronically signed by Reese Pike RN on 2/15/23 at 10:55 AM EST

## 2023-07-17 NOTE — PROGRESS NOTES
Patient was called related to mammogram before next visit.  Pt states its scheduled for 8/9/23-Patient was given [facility] [place] to try to schedule before appointment with Dr Osiel Ceballos

## 2023-07-24 ENCOUNTER — HOSPITAL ENCOUNTER (OUTPATIENT)
Dept: INFUSION THERAPY | Age: 43
Discharge: HOME | End: 2023-07-24
Payer: COMMERCIAL

## 2023-07-24 ENCOUNTER — OFFICE VISIT (OUTPATIENT)
Dept: ONCOLOGY | Age: 43
End: 2023-07-24
Payer: COMMERCIAL

## 2023-07-24 ENCOUNTER — HOSPITAL ENCOUNTER (OUTPATIENT)
Dept: GENERAL RADIOLOGY | Age: 43
Discharge: HOME | End: 2023-07-26
Attending: INTERNAL MEDICINE
Payer: COMMERCIAL

## 2023-07-24 VITALS
HEART RATE: 71 BPM | TEMPERATURE: 97.5 F | WEIGHT: 138.5 LBS | HEIGHT: 62 IN | SYSTOLIC BLOOD PRESSURE: 109 MMHG | BODY MASS INDEX: 25.49 KG/M2 | OXYGEN SATURATION: 99 % | DIASTOLIC BLOOD PRESSURE: 73 MMHG

## 2023-07-24 VITALS — HEIGHT: 62 IN | BODY MASS INDEX: 24.84 KG/M2 | WEIGHT: 135 LBS

## 2023-07-24 DIAGNOSIS — Z85.3 PERSONAL HISTORY OF BREAST CANCER: Primary | ICD-10-CM

## 2023-07-24 DIAGNOSIS — Z12.31 SCREENING MAMMOGRAM FOR HIGH-RISK PATIENT: ICD-10-CM

## 2023-07-24 DIAGNOSIS — C50.919 MALIGNANT NEOPLASM OF BREAST, STAGE 1, ESTROGEN RECEPTOR POSITIVE, UNSPECIFIED LATERALITY (HCC): ICD-10-CM

## 2023-07-24 DIAGNOSIS — Z17.0 MALIGNANT NEOPLASM OF BREAST, STAGE 1, ESTROGEN RECEPTOR POSITIVE, UNSPECIFIED LATERALITY (HCC): ICD-10-CM

## 2023-07-24 LAB
ALBUMIN SERPL-MCNC: 4.3 G/DL (ref 3.5–5.2)
ALP SERPL-CCNC: 152 U/L (ref 35–104)
ALT SERPL-CCNC: 6 U/L (ref 0–32)
ANION GAP SERPL CALCULATED.3IONS-SCNC: 7 MMOL/L (ref 7–16)
AST SERPL-CCNC: 14 U/L (ref 0–31)
BASOPHILS # BLD: 0.06 K/UL (ref 0–0.2)
BASOPHILS NFR BLD: 1 % (ref 0–2)
BILIRUB SERPL-MCNC: 0.4 MG/DL (ref 0–1.2)
BUN SERPL-MCNC: 8 MG/DL (ref 6–20)
CALCIUM SERPL-MCNC: 9.3 MG/DL (ref 8.6–10.2)
CHLORIDE SERPL-SCNC: 108 MMOL/L (ref 98–107)
CO2 SERPL-SCNC: 29 MMOL/L (ref 22–29)
CREAT SERPL-MCNC: 0.8 MG/DL (ref 0.5–1)
EOSINOPHIL # BLD: 0.1 K/UL (ref 0.05–0.5)
EOSINOPHILS RELATIVE PERCENT: 1 % (ref 0–6)
ERYTHROCYTE [DISTWIDTH] IN BLOOD BY AUTOMATED COUNT: 13 % (ref 11.5–15)
GFR SERPL CREATININE-BSD FRML MDRD: >60 ML/MIN/1.73M2
GLUCOSE SERPL-MCNC: 85 MG/DL (ref 74–99)
HCT VFR BLD AUTO: 38.5 % (ref 34–48)
HGB BLD-MCNC: 12.2 G/DL (ref 11.5–15.5)
IMM GRANULOCYTES # BLD AUTO: 0.03 K/UL (ref 0–0.58)
IMM GRANULOCYTES NFR BLD: 0 % (ref 0–5)
LYMPHOCYTES NFR BLD: 3.34 K/UL (ref 1.5–4)
LYMPHOCYTES RELATIVE PERCENT: 31 % (ref 20–42)
MCH RBC QN AUTO: 29.8 PG (ref 26–35)
MCHC RBC AUTO-ENTMCNC: 31.7 G/DL (ref 32–34.5)
MCV RBC AUTO: 93.9 FL (ref 80–99.9)
MONOCYTES NFR BLD: 0.58 K/UL (ref 0.1–0.95)
MONOCYTES NFR BLD: 6 % (ref 2–12)
NEUTROPHILS NFR BLD: 61 % (ref 43–80)
NEUTS SEG NFR BLD: 6.52 K/UL (ref 1.8–7.3)
PLATELET # BLD AUTO: 365 K/UL (ref 130–450)
PMV BLD AUTO: 9.3 FL (ref 7–12)
POTASSIUM SERPL-SCNC: 3.6 MMOL/L (ref 3.5–5)
PROT SERPL-MCNC: 7.2 G/DL (ref 6.4–8.3)
RBC # BLD AUTO: 4.1 M/UL (ref 3.5–5.5)
SODIUM SERPL-SCNC: 144 MMOL/L (ref 132–146)
WBC OTHER # BLD: 10.6 K/UL (ref 4.5–11.5)

## 2023-07-24 PROCEDURE — 99212 OFFICE O/P EST SF 10 MIN: CPT

## 2023-07-24 PROCEDURE — 85027 COMPLETE CBC AUTOMATED: CPT

## 2023-07-24 PROCEDURE — 99213 OFFICE O/P EST LOW 20 MIN: CPT | Performed by: INTERNAL MEDICINE

## 2023-07-24 PROCEDURE — 36415 COLL VENOUS BLD VENIPUNCTURE: CPT

## 2023-07-24 PROCEDURE — 77063 BREAST TOMOSYNTHESIS BI: CPT

## 2023-07-24 PROCEDURE — 80053 COMPREHEN METABOLIC PANEL: CPT

## 2023-07-25 NOTE — PROGRESS NOTES
Patient provided with discharge instructions, patient has [address] [facility]. All questions answered. Patient understands follow up plan of care.
affecting quality of life). D/C Tamoxifen. We recommended Exemestane 25 mg po daily. Side effects of Exemestane reviewed with patient. She agreed to proceed. Exemestane 25 mg po daily was started on 04/08/2019 which she also tolerated poorly (significant arthralgias affecting quality of life). Patient d/c Exemestane and wanted to be observation only. Bilateral Diagnostic Mammogram/Left Breast 04/08/2019 negative for malignancy. Bilateral Breast MRI 10/24/2019: Linear non mass enhancement in the upper outer left breast is probably benign. It is likely due to postsurgical changes/fat necrosis. No evidence of neoplasm on the right. Left Breast US 10/30/2019: There is an oval solid mass measuring 6 mm seen in the left breast. Internal echogenicity is isoechoic. The finding shows no significant increase in vascularity. Left Breast Ultrasound Core Needle Biopsy 10/30/2019:   Left breast, 3 o'clock position, core biopsy: Fat necrosis with foreign body reaction and focal calcification. Negative for epithelial hyperplasia or neoplasia. Bilateral Screening Mammogram 06/09/2020 No mammographic evidence of malignancy. MRI Bilateral Breast 07/07/2020 no convincing evidence of neoplasm bilaterally. Bilateral Screening Mammogram 06/17/2021: No evidence of malignancy  Right Diagnostic Mammogram/US 08/05/2021: No mammographic or sonographic evidence of malignancy in the right breast    Bone scan 08/13/2021: New asymmetric activity at the left ankle, likely due to healing trauma or asymmetric arthropathy. Multifocal degenerative changes     Bilateral Screening Mammogram 06/20/2022: No evidence of malignancy. US Head Neck Soft tissue 06/30/2022: Mild thyroid heterogeneity. Normal vascularity. No discrete nodules. Palpable lump in the left neck soft tissues corresponds to a morphologically normal appearing 17 mm lymph node.   Cortex does not appear thickened on this exam    CT chest 07/01/2022: No evidence of

## 2024-07-25 ENCOUNTER — TELEPHONE (OUTPATIENT)
Dept: INFUSION THERAPY | Age: 44
End: 2024-07-25

## 2024-07-25 DIAGNOSIS — Z12.31 VISIT FOR SCREENING MAMMOGRAM: ICD-10-CM

## 2024-07-25 DIAGNOSIS — Z85.3 PERSONAL HISTORY OF BREAST CANCER: Primary | ICD-10-CM

## 2024-07-25 NOTE — TELEPHONE ENCOUNTER
Called to let patient know that NP Obdulia ordered a mammogram for her to have done prior to her appt. Patient reports understanding. [facility] scheduling number provided. Patient states that she will schedule the mammogram and then reschedule her appt that was supposed to be on 7/29 with Obdulia.

## 2024-08-02 ENCOUNTER — TELEPHONE (OUTPATIENT)
Dept: INFUSION THERAPY | Age: 44
End: 2024-08-02

## 2024-08-05 ENCOUNTER — HOSPITAL ENCOUNTER (OUTPATIENT)
Dept: INFUSION THERAPY | Age: 44
End: 2024-08-05

## (undated) DEVICE — APPLIER LIG CLP M L11IN TI STR RNG HNDL FOR 20 CLP DISP

## (undated) DEVICE — TUBING, SUCTION, 3/16" X 12', STRAIGHT: Brand: MEDLINE

## (undated) DEVICE — CHLORAPREP 26ML ORANGE

## (undated) DEVICE — CONTAINER VACUTAINER ANAER CULTURE SWAB

## (undated) DEVICE — Device

## (undated) DEVICE — ELECTRODE PT RET AD L9FT HI MOIST COND ADH HYDRGEL CORDED

## (undated) DEVICE — Z CONVERTED USE 2275207 CLOTH PREP W7.5XL7.5IN 2% CHG SKIN ALC AND RNS FREE

## (undated) DEVICE — SET INST MINOR PROCEDURE

## (undated) DEVICE — STAPLER SKIN SQ 30 ABSRB STPL DISP INSORB

## (undated) DEVICE — DRAPE THER FLUID WARMING 66X44 IN FLAT SLUSH DBL DISC ORS

## (undated) DEVICE — COVER US PRB W5XL96IN LTX W/ GEL

## (undated) DEVICE — MARKER SURG MARGIN STD 6 CLR INK ASST CORR CLP

## (undated) DEVICE — SOLUTION IV IRRIG WATER 1000ML POUR BRL 2F7114

## (undated) DEVICE — ADHESIVE SKIN CLSR 0.7ML TOP DERMBND ADV

## (undated) DEVICE — EXTRAS MASTECTOMY

## (undated) DEVICE — DRAIN SURG 15FR L3/16IN DIA4.7MM SIL CHN RND HUBLESS FULL

## (undated) DEVICE — SOLUTION IV IRRIG POUR BRL 0.9% SODIUM CHL 2F7124

## (undated) DEVICE — GLOVE SURG SZ 65 THK91MIL LTX FREE SYN POLYISOPRENE

## (undated) DEVICE — DRAPE,REIN 53X77,STERILE: Brand: MEDLINE

## (undated) DEVICE — PACK,UNIV, II AURORA: Brand: MEDLINE

## (undated) DEVICE — TOWEL,OR,DSP,ST,BLUE,STD,6/PK,12PK/CS: Brand: MEDLINE

## (undated) DEVICE — STRIP SKIN CLSR W1XL5IN NYL REINF CURAD

## (undated) DEVICE — 1.5L THIN WALL CAN: Brand: CRD

## (undated) DEVICE — CONTROL SYRINGE LUER-LOCK TIP: Brand: MONOJECT

## (undated) DEVICE — GOWN,SIRUS,FABRNF,XL,20/CS: Brand: MEDLINE

## (undated) DEVICE — GLOVE ORANGE PI 7   MSG9070

## (undated) DEVICE — AGENT HEMSTAT 5GM ARISTA AH

## (undated) DEVICE — HOSE CONN FOR WST MGMT SYS NEPTUNE 2

## (undated) DEVICE — TRAY PROCED PLASTIC CUSTOME SOFT TISS

## (undated) DEVICE — RACK TUBE CURETTE

## (undated) DEVICE — BRA SURGICALXL FULL SUPP SFT CUP FR CLSR ADJ STRP

## (undated) DEVICE — TOTAL TRAY, DB, 100% SILI FOLEY, 16FR 10: Brand: MEDLINE

## (undated) DEVICE — SKIN AFFIX SURG ADHESIVE 72/CS 0.55ML: Brand: MEDLINE

## (undated) DEVICE — STANDARD HYPODERMIC NEEDLE,ALUMINUM HUB: Brand: MONOJECT

## (undated) DEVICE — MARKER,SKIN,WI/RULER AND LABELS: Brand: MEDLINE

## (undated) DEVICE — INTENDED FOR TISSUE SEPARATION, AND OTHER PROCEDURES THAT REQUIRE A SHARP SURGICAL BLADE TO PUNCTURE OR CUT.: Brand: BARD-PARKER ® STAINLESS STEEL BLADES

## (undated) DEVICE — SURGICAL PROCEDURE PACK BASIC

## (undated) DEVICE — STAPLER SKIN L39MM DIA0.53MM CRWN 5.7MM S STL FIX HD PROX

## (undated) DEVICE — SYRINGE IRRIG 60ML SFT PLIABLE BLB EZ TO GRP 1 HND USE W/

## (undated) DEVICE — Z CONVERTED USE 2715898 SWABSTICK MEDICATED W1.75XL6.5IN 1.6ML 3.15% CHG 70% ISO

## (undated) DEVICE — PLASMABLADE PS210-030S 3.0S LOCK: Brand: PLASMABLADE™

## (undated) DEVICE — BANDAGE,GAUZE,4.5"X4.1YD,STERILE,LF: Brand: MEDLINE

## (undated) DEVICE — GLOVE SURG SZ 65 L12IN FNGR THK83MIL CRM POLYISOPRENE

## (undated) DEVICE — TUBING SUCTION 15FT 3MM 32CM SILICONE

## (undated) DEVICE — 4-PORT MANIFOLD: Brand: NEPTUNE 2

## (undated) DEVICE — GOWN,SIRUS,FABRNF,L,20/CS: Brand: MEDLINE

## (undated) DEVICE — SWAB SPEC COLL SHFT L5.25IN POLYUR FOAM TIP SFT DBL MEDIA

## (undated) DEVICE — GLOVE SURG SZ 7 L12IN FNGR THK83MIL CRM POLYISOPRENE

## (undated) DEVICE — GARMENT COMPR STD FOR 17IN CALF UNIF THER FLOTRN

## (undated) DEVICE — HEAVY DRAINAGE PACK: Brand: CURITY

## (undated) DEVICE — SPONGE LAP W18XL18IN WHT COT 4 PLY FLD STRUNG RADPQ DISP ST